# Patient Record
Sex: FEMALE | Race: WHITE | NOT HISPANIC OR LATINO | Employment: FULL TIME | URBAN - METROPOLITAN AREA
[De-identification: names, ages, dates, MRNs, and addresses within clinical notes are randomized per-mention and may not be internally consistent; named-entity substitution may affect disease eponyms.]

---

## 2023-03-02 LAB
ERYTHROCYTE DISTRIBUTION WIDTH (RATIO) BY AUTOMATED COUNT: 14.2 % (ref 11.5–14.5)
ERYTHROCYTE MEAN CORPUSCULAR HEMOGLOBIN CONCENTRATION (G/DL) BY AUTOMATED: 32.2 G/DL (ref 32–36)
ERYTHROCYTE MEAN CORPUSCULAR VOLUME (FL) BY AUTOMATED COUNT: 91 FL (ref 80–100)
ERYTHROCYTES (10*6/UL) IN BLOOD BY AUTOMATED COUNT: 4.08 X10E12/L (ref 4–5.2)
GLUCOSE, 1 HR SCREEN, PREG: 130 MG/DL
HEMATOCRIT (%) IN BLOOD BY AUTOMATED COUNT: 37.3 % (ref 36–46)
HEMOGLOBIN (G/DL) IN BLOOD: 12 G/DL (ref 12–16)
LEUKOCYTES (10*3/UL) IN BLOOD BY AUTOMATED COUNT: 10.4 X10E9/L (ref 4.4–11.3)
NRBC (PER 100 WBCS) BY AUTOMATED COUNT: 0 /100 WBC (ref 0–0)
PLATELETS (10*3/UL) IN BLOOD AUTOMATED COUNT: 157 X10E9/L (ref 150–450)

## 2023-04-27 LAB — GROUP B STREP SCREEN: NORMAL

## 2023-11-07 ENCOUNTER — TELEPHONE (OUTPATIENT)
Dept: OBSTETRICS AND GYNECOLOGY | Facility: CLINIC | Age: 38
End: 2023-11-07
Payer: COMMERCIAL

## 2023-11-07 DIAGNOSIS — B37.2 YEAST DERMATITIS: Primary | ICD-10-CM

## 2023-11-07 RX ORDER — FLUCONAZOLE 150 MG/1
TABLET ORAL
Qty: 3 TABLET | Refills: 1 | Status: SHIPPED | OUTPATIENT
Start: 2023-11-07

## 2023-11-07 NOTE — TELEPHONE ENCOUNTER
Patient was referred to call her obgyn to discuss her symptoms of thrush. She's had for almost two weeks and was wondering if you could recommend anything/send a prescription. Has tried multiple things at home.     Spoke w pt and will tx w fluconazole 150 every other day * 3d

## 2024-04-29 PROBLEM — N91.1 AMENORRHEA, SECONDARY: Status: ACTIVE | Noted: 2024-04-29

## 2024-04-29 PROBLEM — J30.2 SEASONAL ALLERGIES: Status: ACTIVE | Noted: 2024-04-29

## 2024-04-29 PROBLEM — J45.909 ASTHMA (HHS-HCC): Status: ACTIVE | Noted: 2024-04-29

## 2024-04-29 RX ORDER — LORATADINE 10 MG/1
TABLET ORAL
COMMUNITY
Start: 2022-10-18

## 2024-04-29 RX ORDER — NIFEDIPINE 30 MG/1
30 TABLET, EXTENDED RELEASE ORAL DAILY
COMMUNITY
Start: 2023-05-13

## 2024-04-29 RX ORDER — POLYMYXIN B SULFATE AND TRIMETHOPRIM 1; 10000 MG/ML; [USP'U]/ML
SOLUTION OPHTHALMIC
COMMUNITY
Start: 2023-06-23

## 2024-04-29 RX ORDER — ALBUTEROL SULFATE 90 UG/1
AEROSOL, METERED RESPIRATORY (INHALATION)
COMMUNITY
Start: 2022-11-01

## 2024-05-15 ENCOUNTER — APPOINTMENT (OUTPATIENT)
Dept: PULMONOLOGY | Facility: CLINIC | Age: 39
End: 2024-05-15
Payer: COMMERCIAL

## 2024-05-28 ENCOUNTER — LAB (OUTPATIENT)
Dept: LAB | Facility: LAB | Age: 39
End: 2024-05-28
Payer: COMMERCIAL

## 2024-05-29 LAB — COTININE UR QL SCN: NEGATIVE

## 2024-07-24 ENCOUNTER — OFFICE VISIT (OUTPATIENT)
Dept: PULMONOLOGY | Facility: CLINIC | Age: 39
End: 2024-07-24
Payer: COMMERCIAL

## 2024-07-24 VITALS
OXYGEN SATURATION: 98 % | SYSTOLIC BLOOD PRESSURE: 144 MMHG | RESPIRATION RATE: 16 BRPM | WEIGHT: 207.4 LBS | TEMPERATURE: 97.3 F | HEART RATE: 81 BPM | BODY MASS INDEX: 33.48 KG/M2 | DIASTOLIC BLOOD PRESSURE: 89 MMHG

## 2024-07-24 DIAGNOSIS — J45.909 ASTHMA, UNSPECIFIED ASTHMA SEVERITY, UNSPECIFIED WHETHER COMPLICATED, UNSPECIFIED WHETHER PERSISTENT (HHS-HCC): Primary | ICD-10-CM

## 2024-07-24 PROCEDURE — 1036F TOBACCO NON-USER: CPT | Performed by: INTERNAL MEDICINE

## 2024-07-24 PROCEDURE — 99244 OFF/OP CNSLTJ NEW/EST MOD 40: CPT | Performed by: INTERNAL MEDICINE

## 2024-07-24 ASSESSMENT — ENCOUNTER SYMPTOMS
APNEA: 0
EYE REDNESS: 0
WEAKNESS: 0
FATIGUE: 0
NUMBNESS: 0
SHORTNESS OF BREATH: 0
PALPITATIONS: 0
ABDOMINAL DISTENTION: 0
DIZZINESS: 0
AGITATION: 0
ARTHRALGIAS: 0
SINUS PAIN: 0
COUGH: 1
SINUS PRESSURE: 0
ADENOPATHY: 0
HEMATURIA: 0
CHOKING: 0
CONSTIPATION: 0
RHINORRHEA: 0
FEVER: 0
ABDOMINAL PAIN: 0
EYE DISCHARGE: 0
NAUSEA: 0
TREMORS: 0
FACIAL SWELLING: 0
JOINT SWELLING: 0
STRIDOR: 0
UNEXPECTED WEIGHT CHANGE: 0
DIFFICULTY URINATING: 0
SLEEP DISTURBANCE: 0
DYSURIA: 0
NERVOUS/ANXIOUS: 0
SPEECH DIFFICULTY: 0
BRUISES/BLEEDS EASILY: 0
LIGHT-HEADEDNESS: 0
FREQUENCY: 0
HEADACHES: 0
WHEEZING: 0

## 2024-07-24 NOTE — PROGRESS NOTES
@PULMONARY CONSULTATION@         Reason for Consult: asthma     ASSESSMENT:   The patient is a 39-year-old with respiratory symptoms over the last 12 to 13 years beginning with her first pregnancy becoming more persistent since 2016.  She feels at this point she has to determine what is causing the symptoms which are assumed to be related to asthma and get some type of treatment.  She has never been on controller medications to major extent because of all her multiple pregnancies, not wanting to take medication which could potentially an affair with the pregnancy.  She really does not wheezes but has coughing and chest tightness.  She also has a postnasal drip in the morning some eczema.  She has been treated for reflux without improvement.  At times when she starts coughing she drinks water and that seems to help.  The symptoms are likely related to hyperreactive airways but she needs diagnostics to better define the illness and subsequently initiate specific therapies.    PLAN:   Will obtain a respiratory allergy panel along with a CBC with differential looking for eosinophilia    Have ordered complete pulmonary function test and a fractional exhalation of nitric oxide which can be scheduled by calling     In addition I have ordered a chest x-ray which can be obtained at time of appointment tests are completed    Once the results of the above are obtained we will decide on a treatment plan.          HISTORY OF PRESENT ILLNESS:           The patient is a 39-year-old with history of asthma and seasonal allergies.  She reports that starting around 2011 she  developed coughing and chest tightness.  She was reluctant to do much about it because of pregnancy.  Over the last 12 or 13 years she has had 5 children with the most recent bieng 14 months old.  She has now decided that she needs to do something to get her asthma control.  She really has no shortness of breath as such but she can get coughing and  chest tightness at any time.  She had PFTs in 2016 and was given albuterol but she states that she can not use it in the midst of coughing.  She has never been on controllers and has never used a spacer.  She has had some eczema at times on the right hand.  She has a postnasal drip in the morning and has used Flonase but not on a regular basis.  She tried omeprazole for period time without improvement.  She has no aspirin allergy.  She is adopted so there is no known family history.  She is a non-smoker.  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  There are no specific triggers as such but the symptoms can come on when she is talking.  Also at times she has symptoms at night.  She also has a tickle in her throat at times with the postnasal drip particular in the morning.  No Known Allergies     PAST MEDICAL HISTORY:   She has had 5 pregnancies and has had respiratory symptoms over the last 12 to 13 years with coughing and chest tightness.    Current Outpatient Medications:     albuterol 90 mcg/actuation inhaler, Inhale., Disp: , Rfl:     loratadine (Claritin) 10 mg tablet, Take by mouth., Disp: , Rfl:      FAMILY HISTORY:   Adopted  SOCIAL HISTORY:  Never smoker without secondary smoke exposure.  EXPOSURE AND WORK HISTORY:  She works as a nurse practitioner in [employer]     Review of Systems   Constitutional:  Negative for fatigue, fever and unexpected weight change.   HENT:  Positive for postnasal drip. Negative for congestion, facial swelling, nosebleeds, rhinorrhea, sinus pressure and sinus pain.    Eyes:  Negative for discharge, redness and visual disturbance.   Respiratory:  Positive for cough. Negative for apnea, choking, shortness of breath, wheezing and stridor.    Cardiovascular:  Negative for chest pain, palpitations and leg swelling.   Gastrointestinal:  Negative for abdominal distention, abdominal pain, constipation and nausea.   Endocrine: Negative for cold intolerance and heat intolerance.    Genitourinary:  Negative for difficulty urinating, dysuria, frequency and hematuria.   Musculoskeletal:  Negative for arthralgias, gait problem and joint swelling.   Allergic/Immunologic: Negative for environmental allergies, food allergies and immunocompromised state.   Neurological:  Negative for dizziness, tremors, syncope, speech difficulty, weakness, light-headedness, numbness and headaches.   Hematological:  Negative for adenopathy. Does not bruise/bleed easily.   Psychiatric/Behavioral:  Negative for agitation, behavioral problems and sleep disturbance. The patient is not nervous/anxious.         Vitals:    07/24/24 0801   BP: 144/89   Pulse: 81   Resp: 16   Temp: 36.3 °C (97.3 °F)   SpO2: 98%        Physical Exam  Vitals reviewed.   Constitutional:       Appearance: Normal appearance.   HENT:      Head: Normocephalic and atraumatic.   Eyes:      Extraocular Movements: Extraocular movements intact.   Cardiovascular:      Rate and Rhythm: Normal rate and regular rhythm.      Heart sounds: No murmur heard.     No friction rub. No gallop.   Pulmonary:      Effort: Pulmonary effort is normal. No respiratory distress.      Breath sounds: Normal breath sounds. No stridor. No wheezing, rhonchi or rales.   Chest:      Chest wall: No tenderness.   Abdominal:      General: Abdomen is flat. There is no distension.      Palpations: Abdomen is soft. There is no mass.      Tenderness: There is no abdominal tenderness.   Musculoskeletal:         General: Normal range of motion.      Cervical back: Normal range of motion.      Right lower leg: No edema.      Left lower leg: No edema.   Skin:     General: Skin is warm and dry.   Neurological:      Mental Status: She is alert and oriented to person, place, and time.   Psychiatric:         Mood and Affect: Mood normal.         Behavior: Behavior normal.

## 2024-07-24 NOTE — PATIENT INSTRUCTIONS
Will obtain a respiratory allergy panel along with a CBC with differential looking for eosinophilia    Have ordered complete pulmonary function test and a fractional exhalation of nitric oxide which can be scheduled by calling     In addition I have ordered a chest x-ray which can be obtained at time of appointment tests are completed    Once the results of the above are obtained we will decide on a treatment plan.

## 2024-07-24 NOTE — LETTER
July 24, 2024     Herber Dang MD  [address] [facility] [id number]    Patient: Cass Ngo   YOB: 1985   Date of Visit: 7/24/2024       Dear Dr. Herber Dang MD:    Thank you for referring Cass Ngo to me for evaluation. Below are my notes for this consultation.  If you have questions, please do not hesitate to call me. I look forward to following your patient along with you.       Sincerely,     Migel Mohan MD MPH      CC: No Recipients  ______________________________________________________________________________________    @PULMONARY CONSULTATION@         Reason for Consult: asthma     ASSESSMENT:   The patient is a 39-year-old with respiratory symptoms over the last 12 to 13 years beginning with her first pregnancy becoming more persistent since 2016.  She feels at this point she has to determine what is causing the symptoms which are assumed to be related to asthma and get some type of treatment.  She has never been on controller medications to major extent because of all her multiple pregnancies, not wanting to take medication which could potentially an affair with the pregnancy.  She really does not wheezes but has coughing and chest tightness.  She also has a postnasal drip in the morning some eczema.  She has been treated for reflux without improvement.  At times when she starts coughing she drinks water and that seems to help.  The symptoms are likely related to hyperreactive airways but she needs diagnostics to better define the illness and subsequently initiate specific therapies.    PLAN:   Will obtain a respiratory allergy panel along with a CBC with differential looking for eosinophilia    Have ordered complete pulmonary function test and a fractional exhalation of nitric oxide which can be scheduled by calling     In addition I have ordered a chest x-ray which can be obtained at time of appointment tests are completed    Once the results  of the above are obtained we will decide on a treatment plan.          HISTORY OF PRESENT ILLNESS:           The patient is a 39-year-old with history of asthma and seasonal allergies.  She reports that starting around 2011 she  developed coughing and chest tightness.  She was reluctant to do much about it because of pregnancy.  Over the last 12 or 13 years she has had 5 children with the most recent bieng 14 months old.  She has now decided that she needs to do something to get her asthma control.  She really has no shortness of breath as such but she can get coughing and chest tightness at any time.  She had PFTs in 2016 and was given albuterol but she states that she can not use it in the midst of coughing.  She has never been on controllers and has never used a spacer.  She has had some eczema at times on the right hand.  She has a postnasal drip in the morning and has used Flonase but not on a regular basis.  She tried omeprazole for period time without improvement.  She has no aspirin allergy.  She is adopted so there is no known family history.  She is a non-smoker.  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  There are no specific triggers as such but the symptoms can come on when she is talking.  Also at times she has symptoms at night.  She also has a tickle in her throat at times with the postnasal drip particular in the morning.  No Known Allergies     PAST MEDICAL HISTORY:   She has had 5 pregnancies and has had respiratory symptoms over the last 12 to 13 years with coughing and chest tightness.    Current Outpatient Medications:   •  albuterol 90 mcg/actuation inhaler, Inhale., Disp: , Rfl:   •  loratadine (Claritin) 10 mg tablet, Take by mouth., Disp: , Rfl:      FAMILY HISTORY:   Adopted  SOCIAL HISTORY:  Never smoker without secondary smoke exposure.  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   HENT:  Positive for postnasal drip. Negative for congestion, facial swelling, nosebleeds, rhinorrhea, sinus pressure and sinus pain.    Eyes:  Negative for discharge, redness and visual disturbance.   Respiratory:  Positive for cough. Negative for apnea, choking, shortness of breath, wheezing and stridor.    Cardiovascular:  Negative for chest pain, palpitations and leg swelling.   Gastrointestinal:  Negative for abdominal distention, abdominal pain, constipation and nausea.   Endocrine: Negative for cold intolerance and heat intolerance.   Genitourinary:  Negative for difficulty urinating, dysuria, frequency and hematuria.   Musculoskeletal:  Negative for arthralgias, gait problem and joint swelling.   Allergic/Immunologic: Negative for environmental allergies, food allergies and immunocompromised state.   Neurological:  Negative for dizziness, tremors, syncope, speech difficulty, weakness, light-headedness, numbness and headaches.   Hematological:  Negative for adenopathy. Does not bruise/bleed easily.   Psychiatric/Behavioral:  Negative for agitation, behavioral problems and sleep disturbance. The patient is not nervous/anxious.         Vitals:    07/24/24 0801   BP: 144/89   Pulse: 81   Resp: 16   Temp: 36.3 °C (97.3 °F)   SpO2: 98%        Physical Exam  Vitals reviewed.   Constitutional:       Appearance: Normal appearance.   HENT:      Head: Normocephalic and atraumatic.   Eyes:      Extraocular Movements: Extraocular movements intact.   Cardiovascular:      Rate and Rhythm: Normal rate and regular rhythm.      Heart sounds: No murmur heard.     No friction rub. No gallop.   Pulmonary:      Effort: Pulmonary effort is normal. No respiratory distress.      Breath sounds: Normal breath sounds. No stridor. No wheezing, rhonchi or rales.   Chest:      Chest wall: No tenderness.   Abdominal:      General: Abdomen is flat. There is no distension.      Palpations: Abdomen is soft. There is  no mass.      Tenderness: There is no abdominal tenderness.   Musculoskeletal:         General: Normal range of motion.      Cervical back: Normal range of motion.      Right lower leg: No edema.      Left lower leg: No edema.   Skin:     General: Skin is warm and dry.   Neurological:      Mental Status: She is alert and oriented to person, place, and time.   Psychiatric:         Mood and Affect: Mood normal.         Behavior: Behavior normal.

## 2024-12-23 ENCOUNTER — APPOINTMENT (OUTPATIENT)
Dept: OPHTHALMOLOGY | Facility: CLINIC | Age: 39
End: 2024-12-23
Payer: COMMERCIAL

## 2025-01-29 ENCOUNTER — TRANSCRIBE ORDERS (OUTPATIENT)
Dept: RADIOLOGY | Facility: CLINIC | Age: 40
End: 2025-01-29
Payer: COMMERCIAL

## 2025-01-29 DIAGNOSIS — N91.1 SECONDARY AMENORRHEA: Primary | ICD-10-CM

## 2025-02-18 ENCOUNTER — APPOINTMENT (OUTPATIENT)
Dept: RADIOLOGY | Facility: CLINIC | Age: 40
End: 2025-02-18
Payer: COMMERCIAL

## 2025-02-18 ENCOUNTER — APPOINTMENT (OUTPATIENT)
Dept: OBSTETRICS AND GYNECOLOGY | Facility: CLINIC | Age: 40
End: 2025-02-18
Payer: COMMERCIAL

## 2025-02-18 VITALS — WEIGHT: 209 LBS | SYSTOLIC BLOOD PRESSURE: 120 MMHG | DIASTOLIC BLOOD PRESSURE: 78 MMHG | BODY MASS INDEX: 33.73 KG/M2

## 2025-02-18 DIAGNOSIS — N91.1 SECONDARY AMENORRHEA: ICD-10-CM

## 2025-02-18 DIAGNOSIS — N91.1 AMENORRHEA, SECONDARY: ICD-10-CM

## 2025-02-18 DIAGNOSIS — Z33.1 PREGNANT STATE, INCIDENTAL (HHS-HCC): ICD-10-CM

## 2025-02-18 DIAGNOSIS — Z36.82 NUCHAL TRANSLUCENCY OF FETUS ON PRENATAL ULTRASOUND (HHS-HCC): Primary | ICD-10-CM

## 2025-02-18 PROBLEM — O09.529 ANTEPARTUM MULTIGRAVIDA OF ADVANCED MATERNAL AGE (HHS-HCC): Status: ACTIVE | Noted: 2025-02-18

## 2025-02-18 PROBLEM — N83.201 CYST OF RIGHT OVARY: Status: ACTIVE | Noted: 2025-02-18

## 2025-02-18 PROBLEM — Z87.59 HISTORY OF GESTATIONAL HYPERTENSION: Status: ACTIVE | Noted: 2025-02-18

## 2025-02-18 LAB — PREGNANCY TEST URINE, POC: POSITIVE

## 2025-02-18 PROCEDURE — 86900 BLOOD TYPING SEROLOGIC ABO: CPT

## 2025-02-18 PROCEDURE — 86901 BLOOD TYPING SEROLOGIC RH(D): CPT

## 2025-02-18 PROCEDURE — 86850 RBC ANTIBODY SCREEN: CPT

## 2025-02-18 PROCEDURE — 81025 URINE PREGNANCY TEST: CPT | Performed by: OBSTETRICS & GYNECOLOGY

## 2025-02-18 PROCEDURE — 0500F INITIAL PRENATAL CARE VISIT: CPT | Performed by: OBSTETRICS & GYNECOLOGY

## 2025-02-18 PROCEDURE — 87624 HPV HI-RISK TYP POOLED RSLT: CPT

## 2025-02-18 PROCEDURE — 88175 CYTOPATH C/V AUTO FLUID REDO: CPT

## 2025-02-18 PROCEDURE — 76801 OB US < 14 WKS SINGLE FETUS: CPT | Performed by: OBSTETRICS & GYNECOLOGY

## 2025-02-18 ASSESSMENT — EDINBURGH POSTNATAL DEPRESSION SCALE (EPDS)
I HAVE BEEN SO UNHAPPY THAT I HAVE HAD DIFFICULTY SLEEPING: NOT AT ALL
I HAVE FELT SCARED OR PANICKY FOR NO GOOD REASON: NO, NOT AT ALL
THINGS HAVE BEEN GETTING ON TOP OF ME: NO, I HAVE BEEN COPING AS WELL AS EVER
THE THOUGHT OF HARMING MYSELF HAS OCCURRED TO ME: NEVER
I HAVE BEEN ANXIOUS OR WORRIED FOR NO GOOD REASON: NO, NOT AT ALL
I HAVE LOOKED FORWARD WITH ENJOYMENT TO THINGS: AS MUCH AS I EVER DID
I HAVE BLAMED MYSELF UNNECESSARILY WHEN THINGS WENT WRONG: NO, NEVER
I HAVE BEEN SO UNHAPPY THAT I HAVE BEEN CRYING: NO, NEVER
I HAVE FELT SAD OR MISERABLE: NO, NOT AT ALL
I HAVE BEEN ABLE TO LAUGH AND SEE THE FUNNY SIDE OF THINGS: AS MUCH AS I ALWAYS COULD
TOTAL SCORE: 0

## 2025-02-18 NOTE — PROGRESS NOTES
Chief Complaint   Patient presents with    Initial Prenatal Visit     New Ob  Lmp 12/19/24  8+ weeks, edc 9/25/25    C/o  some nausea    Chaperone declined     40-year-old G9, P5.  5 prior vaginal deliveries.  Last pregnancy was in May 2023 when she delivered a 6 pound baby after induction for gestational hypertension and fetal growth restriction.    Planning vasectomy during this pregnancy.    REVIEW OF SYSTEMS    Please see HPI for reported pertinent positives, which would supersede this ROS    Constitutional:  Denies fever, chills, wt gain or loss, fatigue    Genito-Urinary:  Denies genital lesion or sores, vaginal dryness, itching  or pain.  No abnormal vaginal discharge or unexplained vaginal bleeding.  No dysuria, urinary incontinence or frequency.  Denies pelvic pain, dysmenorrhea or dyspareunia.    Eyes:  Denies vision changes, dryness  ENT:  No hearing loss, sinus pain or congestion, nosebleeds  Cardiovascular:  No chest pain or palpitations  Respiratory:  No SOB, cough, wheezing  GI:  No Nausea, vomiting, diarrhea, constipation, abdominal pain  Musculoskeletal:  No new back pain. joint pain, peripheral edema  Skin:  No rash or skin lesion  Neurologic:  No HA, numbness or dizziness  Psychiatric:  No new anxiety or depression  Endocrine:  No loss of hair or hirsutism  Hematologic/lymphatic:  No swollen lymph nodes.  No reported tendency for easy bruising or bleeding    Patient admits to no other systemic complaints      Vitals:    02/18/25 1205   BP: 120/78       PHYSICAL EXAM:    PSYCH:  Pt is alert, oriented and cooperative    SKIN: warm, dry, w/o lesion    HEAD AND FACE:  External inspection of eyes, ears, functional cranial nerves normal and intact    THYROID:  No thyromegaly    CARDIOVASCULAR:  Warm and well Perfused    PULMONARY:  No respiratory distress    ABDOMEN:  soft, nontender.  No mass or organomegaly.      PELVIC:    External genitalia, urethra, perianal region normal to inspection and  palpation if indicated.  No inguinal LA    Vagina without lesions.    Cervix seen and visually normal      Bimanual exam:      No pelvic mass palpable    Uterus nontender, midline in pelvis    No adnexal masses or tenderness    Assessment/Plan    Diagnoses and all orders for this visit:  Amenorrhea, secondary  -     POCT pregnancy, urine manually resulted  -     THINPREP PAP TEST  -     C. trachomatis / N. gonorrhoeae, Amplified, Urogenital  -     Urine Culture  Pregnant state, incidental ([facility])  -     THINPREP PAP TEST  -     C. trachomatis / N. gonorrhoeae, Amplified, Urogenital  -     Urine Culture  AMA  H/o FGR  H/o ghtn - ASA rec  9cm simple cyst R ovary - stable and known from prior pregnancy.

## 2025-02-19 LAB
ABO GROUP (TYPE) IN BLOOD: NORMAL
ANTIBODY SCREEN: NORMAL
C TRACH RRNA SPEC QL NAA+PROBE: NOT DETECTED
N GONORRHOEA RRNA SPEC QL NAA+PROBE: NOT DETECTED
QUEST GC CT AMPLIFIED (ALWAYS MESSAGE): NORMAL
RH FACTOR (ANTIGEN D): NORMAL

## 2025-02-20 LAB
BACTERIA UR CULT: NORMAL
ERYTHROCYTE [DISTWIDTH] IN BLOOD BY AUTOMATED COUNT: 13.1 % (ref 11–15)
EST. AVERAGE GLUCOSE BLD GHB EST-MCNC: 97 MG/DL
EST. AVERAGE GLUCOSE BLD GHB EST-SCNC: 5.4 MMOL/L
HBA1C MFR BLD: 5 % OF TOTAL HGB
HBV SURFACE AG SERPL QL IA: NORMAL
HCT VFR BLD AUTO: 36.5 % (ref 35–45)
HCV AB SERPL QL IA: NORMAL
HGB BLD-MCNC: 12 G/DL (ref 11.7–15.5)
HIV 1+2 AB+HIV1 P24 AG SERPL QL IA: NORMAL
MCH RBC QN AUTO: 28.3 PG (ref 27–33)
MCHC RBC AUTO-ENTMCNC: 32.9 G/DL (ref 32–36)
MCV RBC AUTO: 86.1 FL (ref 80–100)
PLATELET # BLD AUTO: 216 THOUSAND/UL (ref 140–400)
PMV BLD REES-ECKER: 11.5 FL (ref 7.5–12.5)
RBC # BLD AUTO: 4.24 MILLION/UL (ref 3.8–5.1)
RUBV IGG SERPL IA-ACNC: 1.52 INDEX
T PALLIDUM AB SER QL IA: NEGATIVE
WBC # BLD AUTO: 10.8 THOUSAND/UL (ref 3.8–10.8)

## 2025-03-07 LAB
CYTOLOGY CMNT CVX/VAG CYTO-IMP: NORMAL
HPV HR 12 DNA GENITAL QL NAA+PROBE: NEGATIVE
HPV HR GENOTYPES PNL CVX NAA+PROBE: NEGATIVE
HPV16 DNA SPEC QL NAA+PROBE: NEGATIVE
HPV18 DNA SPEC QL NAA+PROBE: NEGATIVE
LAB AP HPV GENOTYPE QUESTION: YES
LAB AP HPV HR: NORMAL
LABORATORY COMMENT REPORT: NORMAL
LMP START DATE: NORMAL
MENSTRUAL HX REPORTED: NORMAL
PATH REPORT.TOTAL CANCER: NORMAL

## 2025-03-18 ENCOUNTER — APPOINTMENT (OUTPATIENT)
Dept: OBSTETRICS AND GYNECOLOGY | Facility: CLINIC | Age: 40
End: 2025-03-18
Payer: COMMERCIAL

## 2025-03-18 ENCOUNTER — APPOINTMENT (OUTPATIENT)
Dept: RADIOLOGY | Facility: CLINIC | Age: 40
End: 2025-03-18
Payer: COMMERCIAL

## 2025-03-21 ENCOUNTER — APPOINTMENT (OUTPATIENT)
Dept: RADIOLOGY | Facility: CLINIC | Age: 40
End: 2025-03-21
Payer: COMMERCIAL

## 2025-03-21 ENCOUNTER — APPOINTMENT (OUTPATIENT)
Dept: OBSTETRICS AND GYNECOLOGY | Facility: CLINIC | Age: 40
End: 2025-03-21
Payer: COMMERCIAL

## 2025-03-21 VITALS — BODY MASS INDEX: 33.25 KG/M2 | WEIGHT: 206 LBS | DIASTOLIC BLOOD PRESSURE: 68 MMHG | SYSTOLIC BLOOD PRESSURE: 102 MMHG

## 2025-03-21 DIAGNOSIS — Z34.81 ENCOUNTER FOR SUPERVISION OF NORMAL PREGNANCY IN MULTIGRAVIDA IN FIRST TRIMESTER: ICD-10-CM

## 2025-03-21 DIAGNOSIS — Z36.82 NUCHAL TRANSLUCENCY OF FETUS ON PRENATAL ULTRASOUND (HHS-HCC): ICD-10-CM

## 2025-03-21 DIAGNOSIS — Z3A.13 13 WEEKS GESTATION OF PREGNANCY (HHS-HCC): ICD-10-CM

## 2025-03-21 PROCEDURE — 0501F PRENATAL FLOW SHEET: CPT | Performed by: OBSTETRICS & GYNECOLOGY

## 2025-03-21 NOTE — PROGRESS NOTES
NT NIPT today.  6 weeks for anatomy scan  Spotting the past week.  Usn today nl.  Disc exam - declines as spotting lessening

## 2025-03-26 DIAGNOSIS — Z3A.13 13 WEEKS GESTATION OF PREGNANCY (HHS-HCC): Primary | ICD-10-CM

## 2025-03-26 PROBLEM — O28.9 ABNORMAL ANTENATAL TEST: Status: ACTIVE | Noted: 2025-03-26

## 2025-03-27 DIAGNOSIS — Z3A.13 13 WEEKS GESTATION OF PREGNANCY (HHS-HCC): Primary | ICD-10-CM

## 2025-04-01 ENCOUNTER — HOSPITAL ENCOUNTER (OUTPATIENT)
Dept: RADIOLOGY | Facility: HOSPITAL | Age: 40
Discharge: HOME | End: 2025-04-01
Payer: COMMERCIAL

## 2025-04-01 ENCOUNTER — TELEPHONE (OUTPATIENT)
Dept: OBSTETRICS AND GYNECOLOGY | Facility: HOSPITAL | Age: 40
End: 2025-04-01
Payer: COMMERCIAL

## 2025-04-01 DIAGNOSIS — Z3A.13 13 WEEKS GESTATION OF PREGNANCY (HHS-HCC): ICD-10-CM

## 2025-04-01 PROCEDURE — 76815 OB US LIMITED FETUS(S): CPT

## 2025-04-01 PROCEDURE — 76815 OB US LIMITED FETUS(S): CPT | Performed by: STUDENT IN AN ORGANIZED HEALTH CARE EDUCATION/TRAINING PROGRAM

## 2025-04-02 ENCOUNTER — CLINICAL SUPPORT (OUTPATIENT)
Dept: GENETICS | Facility: CLINIC | Age: 40
End: 2025-04-02
Payer: COMMERCIAL

## 2025-04-02 ENCOUNTER — TELEPHONE (OUTPATIENT)
Dept: OBSTETRICS AND GYNECOLOGY | Facility: HOSPITAL | Age: 40
End: 2025-04-02
Payer: COMMERCIAL

## 2025-04-02 VITALS — BODY MASS INDEX: 33.09 KG/M2 | WEIGHT: 205 LBS | DIASTOLIC BLOOD PRESSURE: 76 MMHG | SYSTOLIC BLOOD PRESSURE: 130 MMHG

## 2025-04-02 DIAGNOSIS — Z31.5 ENCOUNTER FOR PROCREATIVE GENETIC COUNSELING: ICD-10-CM

## 2025-04-02 DIAGNOSIS — O28.3 ABNORMAL PRENATAL ULTRASOUND: ICD-10-CM

## 2025-04-02 DIAGNOSIS — O28.5 ABNORMAL CHROMOSOMAL AND GENETIC FINDING ON ANTENATAL SCREENING OF MOTHER: ICD-10-CM

## 2025-04-02 DIAGNOSIS — O09.521 SUPERVISION OF ELDERLY MULTIGRAVIDA IN FIRST TRIMESTER: ICD-10-CM

## 2025-04-02 DIAGNOSIS — Z3A.13 13 WEEKS GESTATION OF PREGNANCY (HHS-HCC): ICD-10-CM

## 2025-04-02 PROCEDURE — 96041 GENETIC COUNSELING SVC EA 30: CPT

## 2025-04-02 NOTE — PROGRESS NOTES
"Thank you for the referral of Ms. Cass Ngo. she is a 40 y.o.,  female who was 14 and 6/7 weeks pregnant at the time of our appointment with an EDC of 2025.  She was seen for genetic counseling with with her partner, Gil, due to positive cell free DNA screening for Trisomy 21.    PAST HISTORY:   The couple has 5 children together who are all healthy (12 yo M, 13 yo M, 9 yo F, 5 yo M, 3 yo F). They have had 3 early losses, one of which was confirmed to have Trisomy 15.     Ultrasounds in the current pregnancy:  Dating scan on 2025:  \"Assigned GA8 w + 5 d  Assigned SHALONDA:2025  General Evaluation  ==============  Cardiac activity present  Placenta: Too early to evaluate  Amniotic fluid: normal amount  - Sommers in utero gestation containing yolk sac and fetal pole  - The crown rump length is consistent with the menstrual dating  - Patient states known right ovarian cyst. Measuring 9.3cm on todays study  The patient was informed of the above information.\"    Nuchal translucency scan on 2025:  \"Assigned GA13 w + 1 d  Assigned SHALONDA:2025  General Evaluation  ==============  Cardiac activity present  Placenta: early fundal  Amniotic fluid: normal amount  - Sommers in utero gestation containing yolk sac and fetal pole  - The crown rump length is consistent with the menstrual dating  - Normal Nuchal Translucency  - Rt adnexal cyst 9.3cm unchanged since previous.\"    Early anatomy scan on 2025:  \"Assigned GA14 w + 5 d  Assigned SHALONDA:2025  Impression  =========  REMOTE READ:  Ms. Ngo presents for an early anatomic survey. She had cell free DNA screening that was positive for Trisomy 21 (98.29% PPV).  The purpose of this early second trimester exam is to establish pregnancy dating and to screen for fetal abnormalities reliably detected at this gestational age.  -Gestational age confirmed by fetal biometry  -The heart is difficult to adequately image due to the early " "gestational age, however, appears abnormal on today's exam. The left ventricle appears mildly hypoplastic.  Although this could be due to fetal position, it appears small on all images available for review. The outflow tracts were suboptimally seen.  -The fetal nuchal area does not appear thickened or cystic  -Simple right ovarian cyst measuring 7.8 x 5.8 x 7.1 cm, otherwise unremarkable adnexa  Today's finding were discussed with Cass in person by Dr. Ceja. A fetal echocardiogram was ordered. Cass has a Genetic Counseling appointment scheduled on  4/2/25. Recommend an anatomy ultrasound at 19 weeks\"    Prior aneuploidy screening:  Cell free DNA screening from [employer] reported on 03/26/2025 was high risk for Trisomy 21:      Prior carrier screening:  Normal spinal muscular atrophy screening in 2020. No additional carrier screening results available for review today.     Patient otherwise denies complications such as bleeding or cramping, exposures, infection/illness, and travel outside of the [place] since finding out she was pregnant.    FAMILY HISTORY  Medical and family histories were reviewed and no undue concerns regarding this pregnancy were apparent.    REPORTED ETHNICITY/RACE:   couple    COUNSELING:    The following information was discussed with your patient:    We discussed the methodology for cell free DNA testing in pregnancy, which analyzes placental cell-free DNA obtained from a mother's blood to screen for the presence of common chromosomal abnormalities. The laboratory that performed Ms. Ngo's testing reports a 99.7% sensitivity for Down syndrome (Trisomy 21), 97.9% for trisomy 18, and 99% for trisomy 13. Specificity for these trisomies is >99%. Although sensitivity and specificity rates are high, not all positive results are confirmed to be true positives. The positive predictive value (PPV), or the chance that this is a true positive result and the fetus is affected with Down " syndrome, is approximately 98.29% per the laboratory. The PPV is based on maternal age, gestational age, and test metrics alone. This does not take into account any ultrasound findings. Due to the patient's ultrasound findings, we reviewed that the PPV (chance for the fetus to have Trisomy 21) is likely even closer to 100%.   Because cell free DNA is a blood screen, it is not diagnostic and false positives can occur.  Reasons for false positives include (but are not limited to):   limitation of the technology,   confined placental mosaicism,   maternal factors,   or a vanishing twin.  The availability, benefits and limitations of ultrasound study were discussed today. An ultrasound study is recommended at 12 weeks gestation for assessment of nuchal translucency and again at 19-20 weeks gestation to survey fetal organs. An ultrasound study in the second trimester can identify 50% of Down syndrome cases and 90% of trisomy 18 or trisomy 13 cases.   The availability of diagnostic fetal chromosome analysis via amniocentesis. The methods, benefits, limitations and risks of amniocentesis and a 1 in 400 risk of complications, including a 1 in 800 risk of miscarriage.  We additionally discussed the option of genetic testing at birth. This can often be done through umbilical cord blood at birth. In this case, it is recommended that the pregnancy is managed as if it is a true positive result.   Recurrence risk was briefly discussed at this visit. Most cases of Down syndrome are not inherited and are due to trisomy 21, but there are rare cases that are due to an inherited translocation.  Cell free DNA cannot differentiate between these types. If the pregnancy is genetically confirmed to have trisomy 21 due to nondisjunction, the risk of another affected pregnancy would be increased above risk based on maternal age. If the Trisomy 21 is due to a translocation, and a parent is a carrier, the risks would be significantly higher.  We discussed that diagnostic testing (either prenatally or postnatally) will give us information on recurrence risk.  There is also the possibility of mosaic Down syndrome, which sometimes can be identified by further prenatal testing but not always.   People with Down syndrome have varying levels of intellectual disability with most individuals falling into the mild-moderate intellectual disability range.  Early intervention is known to optimize outcomes.  A referral to [facility] is recommended for children with Down syndrome.  Most babies with Down syndrome will have hypotonia at birth, and physical therapy is beneficial.  About 50% of children with Down syndrome have a heart defect. If the diagnosis is confirmed, a fetal echocardiogram will be recommended. Children with Down syndrome have an increased risk for vision abnormalities and an ophthalmology exam is recommended by six months of age.  Children with Down syndrome also have an increased risk for hearing problems.  Children with Down syndrome are at an increased risk for hypothyroidism, seizures, GI issues, and childhood leukemia.  There is also an increased risk for Alzheimer’s disease in adulthood. While there are a number of conditions that children with Down syndrome are at risk for, most do not have all of these conditions.  Should the pregnancy be truly affected, options include continuation with closer monitoring, adoption, and termination of pregnancy. Each state has their own laws regarding termination of pregnancy. The couple stated that they would not be interested in pregnancy termination, and current state laws were therefore not discussed in detail.     DISPOSITION:  The patient stated that she understood the above information. Diagnostic testing was declined. Additional ultrasounds/fetal echocardiogram are already planned. Patient is aware that I am available if additional questions/concerns arise.    [place] Revised Code (ORC) 3701.69  requires the [facility] to develop a Down syndrome information sheet to be available on the department’s website.  The information sheet is to be given to patients who have a test result indicating Down syndrome, or a prenatal or  diagnosis of Down syndrome.  Physicians, certified nurse-midwives, genetic counselors, hospitals, licensed maternity units,  care nurseries, maternity homes and freestanding birthing centers are required to use the information sheet: [email or web address]_[email or web address]     Thank you for allowing us to participate in the care of your patient.  Should you or your patient have any questions, please do not hesitate to contact our office at [phone].    Total time spent on day of encounter: 50 minutes (35 minutes with patient, 15 minutes on pre/post patient care activities, including documentation).    Sincerely,   Candelaria Anne MS, [facility] [place]  Licensed and Certified Genetic Counselor

## 2025-04-11 LAB — COMMENTS - MP RESULT TYPE: NORMAL

## 2025-04-30 PROBLEM — Z3A.13 13 WEEKS GESTATION OF PREGNANCY (HHS-HCC): Status: ACTIVE | Noted: 2025-04-30

## 2025-04-30 PROBLEM — Z3A.08 8 WEEKS GESTATION OF PREGNANCY (HHS-HCC): Status: ACTIVE | Noted: 2025-04-30

## 2025-04-30 PROBLEM — Q02 MICROCEPHALY (MULTI): Status: ACTIVE | Noted: 2025-04-30

## 2025-04-30 PROBLEM — N91.1 AMENORRHEA, SECONDARY: Status: RESOLVED | Noted: 2024-04-29 | Resolved: 2025-04-30

## 2025-05-02 ENCOUNTER — APPOINTMENT (OUTPATIENT)
Dept: OBSTETRICS AND GYNECOLOGY | Facility: CLINIC | Age: 40
End: 2025-05-02
Payer: COMMERCIAL

## 2025-05-02 ENCOUNTER — APPOINTMENT (OUTPATIENT)
Dept: RADIOLOGY | Facility: CLINIC | Age: 40
End: 2025-05-02
Payer: COMMERCIAL

## 2025-05-02 VITALS — SYSTOLIC BLOOD PRESSURE: 112 MMHG | DIASTOLIC BLOOD PRESSURE: 80 MMHG | BODY MASS INDEX: 33.41 KG/M2 | WEIGHT: 207 LBS

## 2025-05-02 DIAGNOSIS — Z3A.19 19 WEEKS GESTATION OF PREGNANCY (HHS-HCC): ICD-10-CM

## 2025-05-02 DIAGNOSIS — Z34.83 ENCOUNTER FOR SUPERVISION OF NORMAL PREGNANCY IN MULTIGRAVIDA IN THIRD TRIMESTER: ICD-10-CM

## 2025-05-02 PROCEDURE — 0501F PRENATAL FLOW SHEET: CPT | Performed by: OBSTETRICS & GYNECOLOGY

## 2025-05-05 ENCOUNTER — APPOINTMENT (OUTPATIENT)
Dept: RADIOLOGY | Facility: HOSPITAL | Age: 40
End: 2025-05-05
Payer: COMMERCIAL

## 2025-05-05 ENCOUNTER — HOSPITAL ENCOUNTER (OUTPATIENT)
Facility: HOSPITAL | Age: 40
Setting detail: OBSERVATION
End: 2025-05-05
Attending: STUDENT IN AN ORGANIZED HEALTH CARE EDUCATION/TRAINING PROGRAM | Admitting: STUDENT IN AN ORGANIZED HEALTH CARE EDUCATION/TRAINING PROGRAM
Payer: COMMERCIAL

## 2025-05-05 ENCOUNTER — HOSPITAL ENCOUNTER (EMERGENCY)
Facility: HOSPITAL | Age: 40
Discharge: ANOTHER HOSPITAL | End: 2025-05-05
Attending: EMERGENCY MEDICINE
Payer: COMMERCIAL

## 2025-05-05 ENCOUNTER — HOSPITAL ENCOUNTER (OUTPATIENT)
Facility: HOSPITAL | Age: 40
Discharge: HOME | End: 2025-05-05
Attending: STUDENT IN AN ORGANIZED HEALTH CARE EDUCATION/TRAINING PROGRAM | Admitting: STUDENT IN AN ORGANIZED HEALTH CARE EDUCATION/TRAINING PROGRAM
Payer: COMMERCIAL

## 2025-05-05 VITALS
WEIGHT: 205 LBS | BODY MASS INDEX: 32.95 KG/M2 | TEMPERATURE: 98.1 F | RESPIRATION RATE: 20 BRPM | HEART RATE: 91 BPM | SYSTOLIC BLOOD PRESSURE: 160 MMHG | DIASTOLIC BLOOD PRESSURE: 93 MMHG | HEIGHT: 66 IN | OXYGEN SATURATION: 97 %

## 2025-05-05 VITALS
OXYGEN SATURATION: 98 % | WEIGHT: 205.03 LBS | HEIGHT: 66 IN | DIASTOLIC BLOOD PRESSURE: 71 MMHG | SYSTOLIC BLOOD PRESSURE: 119 MMHG | BODY MASS INDEX: 32.95 KG/M2 | HEART RATE: 84 BPM | RESPIRATION RATE: 17 BRPM | TEMPERATURE: 97 F

## 2025-05-05 DIAGNOSIS — R10.31 RIGHT LOWER QUADRANT ABDOMINAL PAIN: Primary | ICD-10-CM

## 2025-05-05 DIAGNOSIS — Z3A.19 19 WEEKS GESTATION OF PREGNANCY (HHS-HCC): ICD-10-CM

## 2025-05-05 LAB
ABO GROUP (TYPE) IN BLOOD: NORMAL
ALBUMIN SERPL BCP-MCNC: 4.1 G/DL (ref 3.4–5)
ALP SERPL-CCNC: 74 U/L (ref 33–110)
ALT SERPL W P-5'-P-CCNC: 10 U/L (ref 7–45)
ANION GAP SERPL CALCULATED.3IONS-SCNC: 10 MMOL/L (ref 10–20)
ANTIBODY SCREEN: NORMAL
APPEARANCE UR: CLEAR
AST SERPL W P-5'-P-CCNC: 12 U/L (ref 9–39)
B-HCG SERPL-ACNC: ABNORMAL MIU/ML
BASOPHILS # BLD AUTO: 0.03 X10*3/UL (ref 0–0.1)
BASOPHILS NFR BLD AUTO: 0.3 %
BILIRUB SERPL-MCNC: 0.7 MG/DL (ref 0–1.2)
BILIRUB UR STRIP.AUTO-MCNC: NEGATIVE MG/DL
BUN SERPL-MCNC: 8 MG/DL (ref 6–23)
CALCIUM SERPL-MCNC: 8.9 MG/DL (ref 8.6–10.3)
CHLORIDE SERPL-SCNC: 104 MMOL/L (ref 98–107)
CO2 SERPL-SCNC: 25 MMOL/L (ref 21–32)
COLOR UR: COLORLESS
CREAT SERPL-MCNC: 0.63 MG/DL (ref 0.5–1.05)
EGFRCR SERPLBLD CKD-EPI 2021: >90 ML/MIN/1.73M*2
EOSINOPHIL # BLD AUTO: 0.02 X10*3/UL (ref 0–0.7)
EOSINOPHIL NFR BLD AUTO: 0.2 %
ERYTHROCYTE [DISTWIDTH] IN BLOOD BY AUTOMATED COUNT: 13.9 % (ref 11.5–14.5)
GLUCOSE SERPL-MCNC: 105 MG/DL (ref 74–99)
GLUCOSE UR STRIP.AUTO-MCNC: NORMAL MG/DL
HCT VFR BLD AUTO: 37 % (ref 36–46)
HGB BLD-MCNC: 12.9 G/DL (ref 12–16)
HOLD SPECIMEN: NORMAL
IMM GRANULOCYTES # BLD AUTO: 0.05 X10*3/UL (ref 0–0.7)
IMM GRANULOCYTES NFR BLD AUTO: 0.5 % (ref 0–0.9)
KETONES UR STRIP.AUTO-MCNC: NEGATIVE MG/DL
LEUKOCYTE ESTERASE UR QL STRIP.AUTO: NEGATIVE
LIPASE SERPL-CCNC: 27 U/L (ref 9–82)
LYMPHOCYTES # BLD AUTO: 0.99 X10*3/UL (ref 1.2–4.8)
LYMPHOCYTES NFR BLD AUTO: 10.5 %
MAGNESIUM SERPL-MCNC: 1.89 MG/DL (ref 1.6–2.4)
MCH RBC QN AUTO: 29.8 PG (ref 26–34)
MCHC RBC AUTO-ENTMCNC: 34.9 G/DL (ref 32–36)
MCV RBC AUTO: 86 FL (ref 80–100)
MONOCYTES # BLD AUTO: 0.33 X10*3/UL (ref 0.1–1)
MONOCYTES NFR BLD AUTO: 3.5 %
NEUTROPHILS # BLD AUTO: 8.04 X10*3/UL (ref 1.2–7.7)
NEUTROPHILS NFR BLD AUTO: 85 %
NITRITE UR QL STRIP.AUTO: NEGATIVE
NRBC BLD-RTO: 0 /100 WBCS (ref 0–0)
PH UR STRIP.AUTO: 7 [PH]
PLATELET # BLD AUTO: 160 X10*3/UL (ref 150–450)
POTASSIUM SERPL-SCNC: 4.1 MMOL/L (ref 3.5–5.3)
PROT SERPL-MCNC: 7.1 G/DL (ref 6.4–8.2)
PROT UR STRIP.AUTO-MCNC: NEGATIVE MG/DL
RBC # BLD AUTO: 4.33 X10*6/UL (ref 4–5.2)
RBC # UR STRIP.AUTO: NEGATIVE MG/DL
RH FACTOR (ANTIGEN D): NORMAL
SODIUM SERPL-SCNC: 135 MMOL/L (ref 136–145)
SP GR UR STRIP.AUTO: 1
UROBILINOGEN UR STRIP.AUTO-MCNC: NORMAL MG/DL
WBC # BLD AUTO: 9.5 X10*3/UL (ref 4.4–11.3)

## 2025-05-05 PROCEDURE — 96376 TX/PRO/DX INJ SAME DRUG ADON: CPT

## 2025-05-05 PROCEDURE — 36415 COLL VENOUS BLD VENIPUNCTURE: CPT | Performed by: PHYSICIAN ASSISTANT

## 2025-05-05 PROCEDURE — 76815 OB US LIMITED FETUS(S): CPT | Mod: FOREIGN READ | Performed by: RADIOLOGY

## 2025-05-05 PROCEDURE — 2500000004 HC RX 250 GENERAL PHARMACY W/ HCPCS (ALT 636 FOR OP/ED): Mod: JZ | Performed by: PHYSICIAN ASSISTANT

## 2025-05-05 PROCEDURE — 74181 MRI ABDOMEN W/O CONTRAST: CPT

## 2025-05-05 PROCEDURE — 84702 CHORIONIC GONADOTROPIN TEST: CPT | Performed by: PHYSICIAN ASSISTANT

## 2025-05-05 PROCEDURE — 80053 COMPREHEN METABOLIC PANEL: CPT | Performed by: PHYSICIAN ASSISTANT

## 2025-05-05 PROCEDURE — 93975 VASCULAR STUDY: CPT

## 2025-05-05 PROCEDURE — 74181 MRI ABDOMEN W/O CONTRAST: CPT | Performed by: RADIOLOGY

## 2025-05-05 PROCEDURE — 83735 ASSAY OF MAGNESIUM: CPT | Performed by: PHYSICIAN ASSISTANT

## 2025-05-05 PROCEDURE — 99214 OFFICE O/P EST MOD 30 MIN: CPT | Mod: 25

## 2025-05-05 PROCEDURE — 99215 OFFICE O/P EST HI 40 MIN: CPT | Mod: 27

## 2025-05-05 PROCEDURE — 81003 URINALYSIS AUTO W/O SCOPE: CPT | Performed by: PHYSICIAN ASSISTANT

## 2025-05-05 PROCEDURE — 76815 OB US LIMITED FETUS(S): CPT

## 2025-05-05 PROCEDURE — 85025 COMPLETE CBC W/AUTO DIFF WBC: CPT | Performed by: PHYSICIAN ASSISTANT

## 2025-05-05 PROCEDURE — 86850 RBC ANTIBODY SCREEN: CPT | Performed by: PHYSICIAN ASSISTANT

## 2025-05-05 PROCEDURE — 76815 OB US LIMITED FETUS(S): CPT | Performed by: RADIOLOGY

## 2025-05-05 PROCEDURE — 83690 ASSAY OF LIPASE: CPT | Performed by: PHYSICIAN ASSISTANT

## 2025-05-05 PROCEDURE — 99213 OFFICE O/P EST LOW 20 MIN: CPT

## 2025-05-05 PROCEDURE — 76817 TRANSVAGINAL US OBSTETRIC: CPT | Performed by: RADIOLOGY

## 2025-05-05 PROCEDURE — G0378 HOSPITAL OBSERVATION PER HR: HCPCS

## 2025-05-05 PROCEDURE — 99214 OFFICE O/P EST MOD 30 MIN: CPT

## 2025-05-05 PROCEDURE — 96374 THER/PROPH/DIAG INJ IV PUSH: CPT

## 2025-05-05 PROCEDURE — 99285 EMERGENCY DEPT VISIT HI MDM: CPT | Mod: 25 | Performed by: EMERGENCY MEDICINE

## 2025-05-05 PROCEDURE — 36415 COLL VENOUS BLD VENIPUNCTURE: CPT

## 2025-05-05 RX ORDER — LABETALOL HYDROCHLORIDE 5 MG/ML
20 INJECTION, SOLUTION INTRAVENOUS ONCE AS NEEDED
Status: DISCONTINUED | OUTPATIENT
Start: 2025-05-05 | End: 2025-05-05 | Stop reason: HOSPADM

## 2025-05-05 RX ORDER — LIDOCAINE HYDROCHLORIDE 10 MG/ML
0.5 INJECTION, SOLUTION INFILTRATION; PERINEURAL ONCE AS NEEDED
Status: DISCONTINUED | OUTPATIENT
Start: 2025-05-05 | End: 2025-05-05 | Stop reason: HOSPADM

## 2025-05-05 RX ORDER — NIFEDIPINE 10 MG/1
10 CAPSULE ORAL ONCE AS NEEDED
Status: DISCONTINUED | OUTPATIENT
Start: 2025-05-05 | End: 2025-05-05 | Stop reason: HOSPADM

## 2025-05-05 RX ORDER — MORPHINE SULFATE 4 MG/ML
4 INJECTION, SOLUTION INTRAMUSCULAR; INTRAVENOUS ONCE
Status: COMPLETED | OUTPATIENT
Start: 2025-05-05 | End: 2025-05-05

## 2025-05-05 RX ORDER — HYDRALAZINE HYDROCHLORIDE 20 MG/ML
5 INJECTION INTRAMUSCULAR; INTRAVENOUS ONCE AS NEEDED
Status: DISCONTINUED | OUTPATIENT
Start: 2025-05-05 | End: 2025-05-05 | Stop reason: HOSPADM

## 2025-05-05 RX ADMIN — SODIUM CHLORIDE 1000 ML: 900 INJECTION, SOLUTION INTRAVENOUS at 09:41

## 2025-05-05 RX ADMIN — MORPHINE SULFATE 4 MG: 4 INJECTION, SOLUTION INTRAMUSCULAR; INTRAVENOUS at 11:19

## 2025-05-05 RX ADMIN — MORPHINE SULFATE 4 MG: 4 INJECTION, SOLUTION INTRAMUSCULAR; INTRAVENOUS at 14:04

## 2025-05-05 RX ADMIN — MORPHINE SULFATE 4 MG: 4 INJECTION, SOLUTION INTRAMUSCULAR; INTRAVENOUS at 10:55

## 2025-05-05 SDOH — SOCIAL STABILITY: SOCIAL INSECURITY: DOES ANYONE TRY TO KEEP YOU FROM HAVING/CONTACTING OTHER FRIENDS OR DOING THINGS OUTSIDE YOUR HOME?: NO

## 2025-05-05 SDOH — HEALTH STABILITY: MENTAL HEALTH: HAVE YOU USED ANY PRESCRIPTION DRUGS OTHER THAN PRESCRIBED IN THE PAST 12 MONTHS?: NO

## 2025-05-05 SDOH — SOCIAL STABILITY: SOCIAL INSECURITY: HAVE YOU HAD THOUGHTS OF HARMING ANYONE ELSE?: NO

## 2025-05-05 SDOH — SOCIAL STABILITY: SOCIAL INSECURITY: ARE YOU OR HAVE YOU BEEN THREATENED OR ABUSED PHYSICALLY, EMOTIONALLY, OR SEXUALLY BY ANYONE?: NO

## 2025-05-05 SDOH — HEALTH STABILITY: MENTAL HEALTH: NON-SPECIFIC ACTIVE SUICIDAL THOUGHTS (PAST 1 MONTH): NO

## 2025-05-05 SDOH — SOCIAL STABILITY: SOCIAL INSECURITY: ABUSE SCREEN: ADULT

## 2025-05-05 SDOH — ECONOMIC STABILITY: HOUSING INSECURITY: DO YOU FEEL UNSAFE GOING BACK TO THE PLACE WHERE YOU ARE LIVING?: NO

## 2025-05-05 SDOH — HEALTH STABILITY: MENTAL HEALTH: SUICIDAL BEHAVIOR (LIFETIME): NO

## 2025-05-05 SDOH — HEALTH STABILITY: MENTAL HEALTH: WISH TO BE DEAD (PAST 1 MONTH): NO

## 2025-05-05 SDOH — SOCIAL STABILITY: SOCIAL INSECURITY: HAVE YOU HAD ANY THOUGHTS OF HARMING ANYONE ELSE?: NO

## 2025-05-05 SDOH — HEALTH STABILITY: MENTAL HEALTH: HAVE YOU USED ANY SUBSTANCES (CANABIS, COCAINE, HEROIN, HALLUCINOGENS, INHALANTS, ETC.) IN THE PAST 12 MONTHS?: NO

## 2025-05-05 SDOH — SOCIAL STABILITY: SOCIAL INSECURITY: PHYSICAL ABUSE: DENIES

## 2025-05-05 SDOH — SOCIAL STABILITY: SOCIAL INSECURITY: DO YOU FEEL ANYONE HAS EXPLOITED OR TAKEN ADVANTAGE OF YOU FINANCIALLY OR OF YOUR PERSONAL PROPERTY?: NO

## 2025-05-05 SDOH — SOCIAL STABILITY: SOCIAL INSECURITY: ARE THERE ANY APPARENT SIGNS OF INJURIES/BEHAVIORS THAT COULD BE RELATED TO ABUSE/NEGLECT?: NO

## 2025-05-05 SDOH — SOCIAL STABILITY: SOCIAL INSECURITY: VERBAL ABUSE: DENIES

## 2025-05-05 SDOH — HEALTH STABILITY: MENTAL HEALTH: WERE YOU ABLE TO COMPLETE ALL THE BEHAVIORAL HEALTH SCREENINGS?: YES

## 2025-05-05 SDOH — SOCIAL STABILITY: SOCIAL INSECURITY: HAS ANYONE EVER THREATENED TO HURT YOUR FAMILY OR YOUR PETS?: NO

## 2025-05-05 ASSESSMENT — PAIN DESCRIPTION - ORIENTATION
ORIENTATION: RIGHT;LOWER
ORIENTATION: RIGHT;LOWER;MID
ORIENTATION: RIGHT

## 2025-05-05 ASSESSMENT — PAIN - FUNCTIONAL ASSESSMENT
PAIN_FUNCTIONAL_ASSESSMENT: 0-10

## 2025-05-05 ASSESSMENT — LIFESTYLE VARIABLES
HOW OFTEN DO YOU HAVE A DRINK CONTAINING ALCOHOL: NEVER
HOW MANY STANDARD DRINKS CONTAINING ALCOHOL DO YOU HAVE ON A TYPICAL DAY: PATIENT DOES NOT DRINK
SKIP TO QUESTIONS 9-10: 1
AUDIT-C TOTAL SCORE: 0
EVER HAD A DRINK FIRST THING IN THE MORNING TO STEADY YOUR NERVES TO GET RID OF A HANGOVER: NO
AUDIT-C TOTAL SCORE: 0
EVER FELT BAD OR GUILTY ABOUT YOUR DRINKING: NO
TOTAL SCORE: 0
HAVE YOU EVER FELT YOU SHOULD CUT DOWN ON YOUR DRINKING: NO
HOW OFTEN DO YOU HAVE 6 OR MORE DRINKS ON ONE OCCASION: NEVER
HAVE PEOPLE ANNOYED YOU BY CRITICIZING YOUR DRINKING: NO

## 2025-05-05 ASSESSMENT — PAIN SCALES - GENERAL
PAINLEVEL_OUTOF10: 10 - WORST POSSIBLE PAIN
PAINLEVEL_OUTOF10: 2
PAINLEVEL_OUTOF10: 10 - WORST POSSIBLE PAIN
PAINLEVEL_OUTOF10: 3
PAINLEVEL_OUTOF10: 8

## 2025-05-05 ASSESSMENT — PAIN DESCRIPTION - LOCATION
LOCATION: ABDOMEN

## 2025-05-05 ASSESSMENT — PAIN DESCRIPTION - PAIN TYPE: TYPE: ACUTE PAIN

## 2025-05-05 ASSESSMENT — PATIENT HEALTH QUESTIONNAIRE - PHQ9
SUM OF ALL RESPONSES TO PHQ9 QUESTIONS 1 & 2: 0
2. FEELING DOWN, DEPRESSED OR HOPELESS: NOT AT ALL
1. LITTLE INTEREST OR PLEASURE IN DOING THINGS: NOT AT ALL

## 2025-05-05 NOTE — H&P
OB Triage H&P    Assessment/Plan    Cass Ngo is a 40 y.o.  at 19w4d, SHALONDA: 2025, by Last Menstrual Period, who presents to triage from [facility] with RLQ pain. Sent to [facility] via EMS for further imaging.    Plan      RLQ Abdominal Pain  - SVE: FT/0/-4  - Pain 10/10-> tylenol + morphine sulfate at [facility]-> 2/10 on presentation at [facility]  - US at [facility] n/f simple cyst on R ovary 7.5 x 6.4 x 5.9  - MRI abdomen w/o contrast negative for appendicitis  - Pelvic US at [facility] negative for ovarian torsion  - Low s/f PTL  - Unknown etiology of pain this morning    IUP 19.4 wga  - bpm by doppler  -Up to date on prenatal care  -Continue routine prenatal care    Dispo  -Patient appropriate for discharge home, agrees with plan  -Return precautions discussed   -Follow up at next scheduled OB appointment or to triage sooner as needed    Discussed plan and reviewed with: Dr. Guerin    Pregnancy Problems (from 25 to present)       Problem Noted Diagnosed Resolved    13 weeks gestation of pregnancy ([facility]) 2025 by Angelita Cartagena MA  No    Priority:  Medium       Overview Signed 2025  3:25 PM by Angelita Cartagena MA   US  25  9w1d, edc 25    3/21/25    Desired provider in labor: [x] CNM  [x] Physician   [x] Either Acceptable  [x] Blood Products: [x] Yes, accepts [] No, needs counseling  [x] Initial BMI: 33.10   [x] Prenatal Labs:  25  [x] Cervical Cancer Screening up to date  [x] Rh status:  POSITIVE  [] Screen for IPV and Substance Use Risk:  [x] Genetic Screening (cfDNA):  3/21/25  [x] First Trimester Anatomy Screen (11-13.6 wks): 3/21/25, NT  [] Baby ASA (initiated):  [x] Pregnancy dated by:  LMP    [x] Anatomy US: (19-20 wks)  [] Federal Sterilization consent signed (if indicated):  [] 1hr GCT at 24-28wks:  [] Rhogam (if indicated): n/a  [x] Fetal Surveillance (if indicated):  [] Tdap (27-32 wks, may be given up to 36 wks if initial window missed):   [] RSV (32-36  "wks) (Sept. to end of ):     [] Feeding Intentions:  [] Postpartum Birth control method:   [] GBS at 36 - 37 wks:  [] 39 weeks discussion of IOL vs. Expectant management:  [x] Mode of delivery ( anticipated ): V         Antepartum multigravida of advanced maternal age ([facility]) 2025 by Herber Dang MD  No    Priority:  Medium               Subjective     Denies vaginal bleeding., Denies contractions., Denies leaking of fluid.      RLQ pain started suddenly this morning and got progressively worse to the point she was in severe pain, unable to stand up straight. Denies N/V.  She received Tylenol and morphine in the ED at [facility], now pain is a 2/10 (down from 10/10).   Denies constipation- reports normal BMs, last BM yesterday morning.    Prenatal Provider Dr. Dang    OB History    Para Term  AB Living   9 5 5 0 3 5   SAB IAB Ectopic Multiple Live Births   2 0 1 0 5      # Outcome Date GA Lbr Delvis/2nd Weight Sex Type Anes PTL Lv   9 Current            8 Term 23 38w1d  2.722 kg  Vag-Spont EPI N JULITA   7 Term 21 38w0d  3.09 kg M Vag-Spont EPI N JULITA      Name: \"Cuco\"   6 Term 16 40w2d  4.167 kg F Vag-Spont EPI N JULITA      Name: \"Khushbu\"   5 Term 10/18/12 38w4d  4.252 kg M Vag-Spont EPI N JULITA      Name: \"Omkar\"   4 Term 11 39w3d  4.111 kg M Vag-Spont EPI N JULITA      Name: \"Lex\"   3 Ectopic            2 SAB  10w0d          1 SAB  9w5d              Surgical History[1]    Social History     Tobacco Use    Smoking status: Never     Passive exposure: Never    Smokeless tobacco: Never   Substance Use Topics    Alcohol use: Not Currently       Allergies[2]    Prescriptions Prior to Admission[3]  Objective     Last Vitals  Temp Pulse Resp BP MAP O2 Sat   36.6 °C (97.9 °F) 95 (Simultaneous filing. User may not have seen previous data.) 18 135/75 (Simultaneous filing. User may not have seen previous data.) 99 97 %     Blood Pressures         2025  1514             " BP: 135/75             Physical Exam  General: NAD, mood appropriate  Cardiopulmonary: warm and well perfused, breathing comfortably on room air  Abdomen: Gravid, tender with palpation of RLQ-> mid-abdomen on lateral aspect, no rebound tenderness, no guarding  Extremities: Symmetric  Speculum Exam: deferred  Cervix: Fingertip /  /      Chaperone Present: Yes.  Chaperone Name/Title: Yary Santiago RN  Examination Chaperoned: Gynecological Exam     Fetal Monitoring   bpm by doppler    Bedside ultrasound: No    Labs in chart were reviewed.   Results from last 7 days   Lab Units 05/05/25  0941   WBC AUTO x10*3/uL 9.5   HEMOGLOBIN g/dL 12.9   HEMATOCRIT % 37.0   PLATELETS AUTO x10*3/uL 160   AST U/L 12   ALT U/L 10   CREATININE mg/dL 0.63        Prenatal labs reviewed, not remarkable.             [1]   Past Surgical History:  Procedure Laterality Date    OTHER SURGICAL HISTORY  10/18/2022    Dilation and curettage    OTHER SURGICAL HISTORY  10/18/2022    Cholecystectomy    OTHER SURGICAL HISTORY  10/18/2022    Tonsillectomy with adenoidectomy    OTHER SURGICAL HISTORY  10/18/2022    Uterine polypectomy   [2] No Known Allergies  [3]   Medications Prior to Admission   Medication Sig Dispense Refill Last Dose/Taking    albuterol 90 mcg/actuation inhaler Inhale.       loratadine (Claritin) 10 mg tablet Take by mouth.

## 2025-05-05 NOTE — ED PROVIDER NOTES
HPI   Chief Complaint   Patient presents with    Abdominal Pain       HPI  40-year-old female coming in for right lower quad abdominal pain with pregnancy.  The patient has indicated that pain started yesterday, it was sharp in nature, isolated predominant to the right lower quadrant.  No injury trauma or impact.  She also endorses that she is approximately 19 weeks pregnant.  She denies any vaginal discharge or bleeding.  Says that her pain is actually better now but based on location of the pain she was concerned and wanted to come in for assessment.  History of cholecystectomy in the past.  Says that she currently feels much better and does not have any specific complaint      Patient History   Medical History[1]  Surgical History[2]  Family History[3]  Social History[4]    Physical Exam   ED Triage Vitals [05/05/25 0814]   Temperature Heart Rate Respirations BP   36.3 °C (97.3 °F) 88 16 119/76      Pulse Ox Temp Source Heart Rate Source Patient Position   100 % Temporal Radial Sitting      BP Location FiO2 (%)     Left arm --       Physical Exam  PHYSICAL EXAMINATION    GENERAL APPEARANCE: Awake and alert.     VITAL SIGNS: As per the nurses' triage record.     HEENT: Normocephalic, atraumatic. Extraocular muscles are intact. Pupils equal round and reactive to light. Conjunctiva are pink. Negative scleral icterus. Mucous membranes are moist. Tongue in the midline. Pharynx was without erythema or exudates, uvula midline    NECK: Soft Nontender and supple, full gross ROM, no meningeal signs.    CHEST: Nontender to palpation. Clear to auscultation bilaterally. No rales, rhonchi, or wheezing.     HEART: S1, S2. Regular rate and rhythm. No murmurs, gallops or rubs.  Strong and equal pulses in the extremities.     ABDOMEN: Soft, nontender, no guarding rebound or rigidity nondistended, positive bowel sounds, no palpable masses.    MUSCULOSKELETAL: The calves are nontender to palpation. Full gross active range of motion.  Ambulating on own with no acute difficulties     NEUROLOGICAL: Awake, alert and oriented x 3. Power intact in the upper and lower extremities. Sensation is intact to light touch in the upper and lower extremities.     IMMUNOLOGICAL: No lymphatic streaking noted     DERM: No petechiae, rashes, or ecchymoses.    ED Course & MDM   ED Course as of 05/05/25 1804   Mon May 05, 2025   1051 Patient suddenly started having worsening of pain once again, discussed risk and benefit of opiates, being pregnant I offered Tylenol versus morphine, patient says that she did something stronger than Tylenol and is agreeable to the risk versus benefit of morphine. [AP]   1234 Spoke to Dr. Brenda Villa, she requested an MRI be done for evaluation of appendectomy and then also spoke to the [facility] physician who has accepted transfer.  Will work on getting the MRI before transfer however we will not delay transfer. [AP]      ED Course User Index  [AP] Derrick Keys, ORLY         Diagnoses as of 05/05/25 1804   Right lower quadrant abdominal pain   19 weeks gestation of pregnancy ([facility])                 No data recorded     Patti Coma Scale Score: 15 (05/05/25 0815 : Cara Singh, SUZANNE)                           Medical Decision Making  Parts of this chart have been completed using voice recognition software. Please excuse any errors of transcription.  My thought process and reason for plan has been formulated from the time that I saw the patient until the time of disposition and is not specific to one specific moment during their visit and furthermore my MDM encompasses this entire chart and not only this text box.      HPI: Detailed above.    Exam: A medically appropriate exam performed, outlined above, given the known history and presentation.    History Limited by: Nothing    History obtained from: The patient    External/internal records reviewed: No external record reviewed    Social Determinants of Health considered during  this visit: Lives at home    Chronic conditions impacting care: Denies    Medications given during visit:  Medications   sodium chloride 0.9 % bolus 1,000 mL (0 mL intravenous Stopped 5/5/25 1011)   morphine injection 4 mg (4 mg intravenous Given 5/5/25 1055)   morphine injection 4 mg (4 mg intravenous Given 5/5/25 1119)   morphine injection 4 mg (4 mg intravenous Given 5/5/25 1404)        Diagnostic/tests  Labs Reviewed   CBC WITH AUTO DIFFERENTIAL - Abnormal       Result Value    WBC 9.5      nRBC 0.0      RBC 4.33      Hemoglobin 12.9      Hematocrit 37.0      MCV 86      MCH 29.8      MCHC 34.9      RDW 13.9      Platelets 160      Neutrophils % 85.0      Immature Granulocytes %, Automated 0.5      Lymphocytes % 10.5      Monocytes % 3.5      Eosinophils % 0.2      Basophils % 0.3      Neutrophils Absolute 8.04 (*)     Immature Granulocytes Absolute, Automated 0.05      Lymphocytes Absolute 0.99 (*)     Monocytes Absolute 0.33      Eosinophils Absolute 0.02      Basophils Absolute 0.03     COMPREHENSIVE METABOLIC PANEL - Abnormal    Glucose 105 (*)     Sodium 135 (*)     Potassium 4.1      Chloride 104      Bicarbonate 25      Anion Gap 10      Urea Nitrogen 8      Creatinine 0.63      eGFR >90      Calcium 8.9      Albumin 4.1      Alkaline Phosphatase 74      Total Protein 7.1      AST 12      Bilirubin, Total 0.7      ALT 10     URINALYSIS WITH REFLEX CULTURE AND MICROSCOPIC - Abnormal    Color, Urine Colorless (*)     Appearance, Urine Clear      Specific Gravity, Urine 1.005      pH, Urine 7.0      Protein, Urine NEGATIVE      Glucose, Urine Normal      Blood, Urine NEGATIVE      Ketones, Urine NEGATIVE      Bilirubin, Urine NEGATIVE      Urobilinogen, Urine Normal      Nitrite, Urine NEGATIVE      Leukocyte Esterase, Urine NEGATIVE     HUMAN CHORIONIC GONADOTROPIN, SERUM QUANTITATIVE - Abnormal    HCG, Beta-Quantitative 79,359 (*)     Narrative:      Total HCG measurement is performed using the [employer] Access   Immunoassay which detects intact HCG and free beta HCG subunit.    This test is not indicated for use as a tumor marker.   HCG testing is performed using a different test methodology at [facility] than other [facility] hospitals. Direct result comparison   should only be made within the same method.       LIPASE - Normal    Lipase 27      Narrative:     Venipuncture immediately after or during the administration of Metamizole may lead to falsely low results. Testing should be performed immediately prior to Metamizole dosing.   MAGNESIUM - Normal    Magnesium 1.89     TYPE AND SCREEN    ABO TYPE O      Rh TYPE POS      ANTIBODY SCREEN NEG     URINALYSIS WITH REFLEX CULTURE AND MICROSCOPIC    Narrative:     The following orders were created for panel order Urinalysis with Reflex Culture and Microscopic.  Procedure                               Abnormality         Status                     ---------                               -----------         ------                     Urinalysis with Reflex C...[[id number]]  Abnormal            Final result               Extra Urine Gray Tube[[id number]]                            In process                   Please view results for these tests on the individual orders.   EXTRA URINE GRAY TUBE      US OB limited 1+ fetuses   Final Result   Sommers intrauterine pregnancy.  Established SHALONDA is September 25, 2025 by LMP, correlating with 19 weeks 4 days gestation.   1.7 x 1.2 x 2.1 cm placental lake.   6.4 x 5.9 x 7.5 cm simple right ovarian cyst, stable when compared to   prior ultrasound.   Signed by Edison Valencia MD           Case discussed with: ed attending and transfer center    Considerations/further MDM:   I have considered and evaluated for the following diagnoses: Acute appendicitis, bowel obstruction, cholecystitis, diverticulitis, hernia, incarcerated/strangulated hernia, mesenteric ischemia, pancreatitis  Acute appendicitis, bowel  obstruction, cholecystitis, diverticulitis, hernia, incarcerated/strangulated hernia, mesenteric ischemia, also considered perforated bowel, ulcer, acute abdomen, surgical abdomen, splenic injury or internal bleeding, pancreatitis, also considered perforated bowel, ulcer, acute abdomen, surgical abdomen, splenic injury or internal bleeding.    With the large right sided cyst intermittent torsion is certainly considered.  However it was also considered that this may be appendicitis, an MRI was ordered but not completed due to the fact that MRI was unavailable until 5 PM.  The patient has been accepted in transfer for further assessment downtown  Procedure  Procedures       [1]   Past Medical History:  Diagnosis Date    14 weeks gestation of pregnancy ([facility]) 11/29/2022    14 weeks gestation of pregnancy    Amenorrhea, secondary 04/29/2024    Chronic cough 05/31/2016    Encounter for supervision of other normal pregnancy, first trimester 11/29/2022    Encounter for supervision of normal pregnancy in multigravida in first trimester    Secondary amenorrhea 11/01/2022    Amenorrhea, secondary   [2]   Past Surgical History:  Procedure Laterality Date    OTHER SURGICAL HISTORY  10/18/2022    Dilation and curettage    OTHER SURGICAL HISTORY  10/18/2022    Cholecystectomy    OTHER SURGICAL HISTORY  10/18/2022    Tonsillectomy with adenoidectomy    OTHER SURGICAL HISTORY  10/18/2022    Uterine polypectomy   [3] No family history on file.  [4]   Social History  Tobacco Use    Smoking status: Never     Passive exposure: Never    Smokeless tobacco: Never   Vaping Use    Vaping status: Never Used   Substance Use Topics    Alcohol use: Not Currently    Drug use: Never        Derrick Keys PA-C  05/05/25 9696     Alert and oriented to person, place and time. Normal mood and affect, no apparent risk to self or others

## 2025-05-05 NOTE — ED TRIAGE NOTES
"Drove self to ER with c/o RLQ pain since 0500. Took tylenol with some  pain relief. Pain 10/10 to 3/10 \"Just wanted to make sure it wasn't appendicitis. I also have an ovarian cyst on that side\" per pt.  19wks pregnant.   "

## 2025-05-08 ENCOUNTER — APPOINTMENT (OUTPATIENT)
Dept: PEDIATRIC CARDIOLOGY | Facility: CLINIC | Age: 40
End: 2025-05-08
Payer: COMMERCIAL

## 2025-05-08 VITALS
WEIGHT: 205.25 LBS | HEIGHT: 67 IN | HEART RATE: 77 BPM | SYSTOLIC BLOOD PRESSURE: 125 MMHG | BODY MASS INDEX: 32.21 KG/M2 | DIASTOLIC BLOOD PRESSURE: 80 MMHG

## 2025-05-08 DIAGNOSIS — O35.BXX0 ABNORMAL FETAL ECHOCARDIOGRAPHY AFFECTING ANTEPARTUM CARE OF MOTHER, SINGLE OR UNSPECIFIED FETUS (HHS-HCC): Primary | ICD-10-CM

## 2025-05-08 DIAGNOSIS — O28.3 ABNORMAL FETAL ULTRASOUND: ICD-10-CM

## 2025-05-08 DIAGNOSIS — O35.8XX0 MATERNAL CARE FOR OTHER (SUSPECTED) FETAL ABNORMALITY AND DAMAGE, NOT APPLICABLE OR UNSPECIFIED (HHS-HCC): ICD-10-CM

## 2025-05-08 PROCEDURE — 76825 ECHO EXAM OF FETAL HEART: CPT | Performed by: PEDIATRICS

## 2025-05-08 PROCEDURE — 76827 ECHO EXAM OF FETAL HEART: CPT | Performed by: PEDIATRICS

## 2025-05-08 PROCEDURE — 3008F BODY MASS INDEX DOCD: CPT | Performed by: PEDIATRICS

## 2025-05-08 PROCEDURE — 93325 DOPPLER ECHO COLOR FLOW MAPG: CPT | Performed by: PEDIATRICS

## 2025-05-08 PROCEDURE — 99245 OFF/OP CONSLTJ NEW/EST HI 55: CPT | Performed by: PEDIATRICS

## 2025-05-08 NOTE — PATIENT INSTRUCTIONS
Your fetus likely has an atrioventricular canal defect (AV canal).     A complete atrioventricular canal defect is actually a combination of several closely associated heart problems that result in:    Atrial septal defect (ASD), a hole in the wall between the upper chambers of the heart  Ventricular septal defect (VSD), a hole in the wall between the pumping chambers of the heart  Abnormalities of the atrioventricular valves (usually mitral and tricuspid), the “doors” between the receiving chambers and the pumping chambers. There should be 2 valves, but in this case, there is only one valve    Blood can move freely among the four heart chambers, mixing oxygen-rich (red) blood with oxygen-poor (blue) blood.  AV canal occurs in two out of 10,000 births.  The condition is common in children with Down syndrome (about 40%).  Surgery in the first six months of life is often necessary to correct the defects.  After surgery, most children lead healthy lives but will need lifelong follow-up care.  A small proportion (~10%) will need additional surgery later in life    In most children, the cause isn't known. It's a very common type of heart defect in children with a chromosome problem, Trisomy 21 (Down syndrome). Some children can have other heart defects along with AV canal.    Normally, the left side of the heart only pumps blood to the body, and the heart's right side only pumps blood to the lungs. In a child with AV canal defect, blood can travel across the holes from the left heart chambers to the right heart chambers and out into the lung arteries. The extra blood being pumped into the lung arteries makes the heart and lungs work harder and the lungs can become congested.    A child with AV canal defect may breathe faster and harder than normal. Infants may have trouble feeding and growing at a normal rate. Symptoms may not occur until several weeks after birth. High pressure may occur in the blood vessels in  the lungs because more blood than normal is being pumped there. Over time this causes permanent damage to the lung blood vessels.    In some infants, the common valve between the upper and lower chambers doesn't close properly. This lets blood leak backward from the heart's lower chambers to the upper ones. This leak, called regurgitation or insufficiency, can make the heart work harder, too.    An AV canal can be fixed. Open-heart surgery is needed to repair the defect. Unlike some other types of septal defects, the AV canal defect can't close on its own. Medicines may be used temporarily to help with symptoms, but they don't cure the defect or prevent permanent damage to the lung arteries.     There is also a variation of normal in the way the veins from the upper left body return to the heart, a persistent left superior vena cava.  In most cases, the left upper veins drain across the upper chest and join a large vein to come back to the right atrium (right receiving chamber).  In this case, the veins drain behind the heart and join the coronary veins to go to the right atrium.  We do not need to do anything about this.     Sometimes it is not possible to see all the heart structures because of the position or size of the fetus. This does not mean they are not there, but may mean that for technical reasons they cannot be assessed. Sometimes this information may not be important; while in some cases it means that definite answers are not possible. The echocardiographer will discuss this with you if necessary, and repeat studies are frequently performed later in the pregnancy.     Certain congenital heart abnormalities are also hard to detect by fetal echocardiograms, but often these are simple abnormalities. We do not mention this to concern you, but rather that you understand that there are technical limitations to such studies. It remains important that your pediatrician provides a normal careful medical  examination of the baby (including the heart) takes place after birth, and if there were any suspicious cardiac findings, that these are evaluated in the usual way irrespective of the findings at fetal study.     Certain communications between the two sides of the circulation are normally present in all developing babies and normally close after birth. We are not able to tell in advance whether this will occur, however there is only a tiny chance that they will not. Persistence of these structures is generally not a difficult problem to deal with.     We direct our attention only to the heart, where we have special expertise. This is not the same as your general obstetric ultrasound scan. Other ultrasound information about the fetus can be obtained from an obstetric ultrasonographer or your obstetrician.

## 2025-05-08 NOTE — PROGRESS NOTES
Cass Ngo was seen at the request of Herber Dang for a chief complaint of AMA, increased risk of trisomy 21 on NIPT, and possible HLHS on 15 3/7 week obstetric scan; a report with my findings is being sent via written or electronic means the referring physician with my recommendations for treatment.     I had the pleasure of seeing Cass Ngo in Pediatric Cardiology consultation at our [facility] location as part of our prenatal heart program for AMA, increased risk of trisomy 21 on NIPT, and possible HLHS on 15 3/7 week obstetric scan.  She is a 40 y.o. year-old  woman, currently 20w0d weeks gestation. Patient's last menstrual period was 2024. Estimated Date of Delivery: 25.  There have been no pregnancy complications.   She has not been hospitalized during this pregnancy.  She had a NIPT, which indicated an increased risk of trisomy 21 (98.29% PPV).  She has not yet had a second trimester ultrasound.      Prior to the visit, I personally reviewed the cardiac portions of the obstetrical ultrasound performed on 25.  There appears to be a complete common atrioventricular canal defect that is balanced at the atrial and ventricular level. There is no evidence of right or left ventricular outflow obstruction or significant valvular regurgitation.    Her previous obstetrical history is significant for 5 full term deliveries, 2 miscarriages, and 1 ectopic pregnancy.  Her past medical history is significant for asthma.  She has no history of congenital heart disease, arrhythmia, cardiomyopathy, hypercholesterolemia, hypertension, diabetes, rheumatic heart disease, cancer, lupus, Sjogren syndrome, clotting disorder, depression, anxiety, alcohol abuse, phenylketonuria, or DiGeorge.  She has had a cholecystectomy.  She is not taking any medications.  She has no known allergies.  She is currently taking prenatal vitamins.      Her family history is negative for congenital heart  "disease, early atherosclerosis, sudden cardiac death, long QT syndrome, cardiomyopathy, aortic aneurysm, or genetic or metabolic disease.  EBONI states that his family history is negative for all the above as well.    She currently lives with her fiance and 5 children.  She works as an NP at [employer].  She does not smoke.  She denies illicit drug use or alcohol abuse.  She denies verbal, sexual, or physical abuse.     Delivery [facility]: [facility]  Father of the baby's name: Luis E    /80 (BP Location: Right arm, Patient Position: Sitting)   Pulse 77   Ht 1.698 m (5' 6.85\")   Wt 93.1 kg (205 lb 4 oz)   LMP 12/19/2024   BMI 32.29 kg/m²     She was resting comfortably in the examination room and alert, active and in no respiratory distress. Skin was without rash.  HEENT: moist mucous membranes, no JVD, goiter. Breathing is not labored.  She was acyanotic.  There was no peripheral edema.   The abdomen was gravid, soft, nontender with normal bowel sounds.  The liver was not palpable.  The spleen tip was not palpable.  She had a normal gait and normal strength in all extremities.  Cranial nerves II - XII are intact.  She had no clubbing, cyanosis, or edema.    Two-dimensional sector scan and Doppler fetal echocardiogram performed at 20w0d weeks gestation show segmental anatomy S,D,S with complete atrioventricular canal defect and a persistent left superior vena cava. There is no atrial dilation. The foramen ovale is patent. There is a moderate sized primum atrial septal defect. At least 2 pulmonary veins are seen returning normally to the left atrium. 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  The left SVC drains to a dilated coronary sinus.  The inferior vena cava returns normally to the right atrium. There is a common atrioventricular valve; with trivial atrioventricular valve regurgitation. The valve is balanced over both ventricles.  There is a large inlet ventricular septal defect.  The left and right ventricular " size and shortening are normal. No left ventricular outflow tract or right ventricular outflow tract obstruction. There is a left aortic arch. The aortic and ductal arches are patent. The fetal heart rate was within normal limits without ectopy or arrhythmia seen.  The spectral Doppler patterns were normal across all valves.  The spectral Doppler patterns of the ductal arch, aortic arch, aortic isthmus and umbilical artery were within normal limits.  There is a physiologic pericardial effusion.  Please see report for details.    In summary, Cass is a 40 y.o.  woman, currently 20w0d weeks gestation, who had a fetal echo consistent with a complete common atrioventricular canal defect. The defect appears balanced at the atrial and ventricular level. The diagnosis was discussed in detail with the parents including medical management in the post-uzair period and surgical intervention, which generally occurs around 4-6 months of age.   In the meantime, we did not make any changes to her current delivery plan.  We did not prescribe any medications.  We did not recommend intervention.  As always, we recommend a heart healthy lifestyle.  If the pregnancy is continued, recommend follow up fetal echocardiogram in the early 3rd trimester. We recommend a cardiology consultation within 24 hours after birth.  Disposition will depend to either the NICU or  nursery will depend on this evaluation.  The findings and limitations of fetal echocardiography were explained.    LOC: 2  Today's fetal echocardiogram demonstrated complete common atrioventricular canal defect.  This is a cardiovascular anomaly or disease that is NOT expected to cause hemodynamic instability at birth, but has potential to cause hemodynamic instability.  Neonatology management in [facility]  Non emergent cardiology consultation.  For fetal echocardiogram findings of an unclear etiology, as long as the baby's clinical presentation allows, recommended a  cardiology evaluation in conjunction with the neonatology and CTICU teams in [facility] to confirm fetal findings prior to ultimate disposition ([facility], CTICU or NICU*).     Scribe’s statement: I, Sofia Ulrich CNP, am scribing for, and in the presence of, Dr. Farias.    Thank you for allowing me to participate in Cass's care.  If you have any further questions, please do not hesitate to contact me.     Lex Farias M.D.  [facility], Director  Ambulatory Pediatric Cardiology   Division of Pediatric Cardiology  [facility]  [facility]   of Pediatrics, [employer]  [facility] [address] [place] [address]  [place] [id number]  Office:  [phone]  Fax:       [phone]  e-mail:  [email or web address]    I spent greater than 60 minutes in performance of this consultation, of which greater than 50% was related to coordination of care or counseling.

## 2025-05-08 NOTE — LETTER
May 8, 2025     Herber Dang MD  [address] [address] [address]    Patient: Cass Ngo   YOB: 1985   Date of Visit: 2025       Dear Dr. Herber Dang MD:    Thank you for referring Cass Ngo to me for evaluation. Below are my notes for this consultation.  If you have questions, please do not hesitate to call me. I look forward to following your patient along with you.       Sincerely,     Lex Farias MD      CC: MD Shannan Holt, APRN-CN, [facility]  Sofia Ulrich, APRN-CNP  ______________________________________________________________________________________    Cass Ngo was seen at the request of Herber Dang for a chief complaint of AMA, increased risk of trisomy 21 on NIPT, and possible HLHS on 15 3/7 week obstetric scan; a report with my findings is being sent via written or electronic means the referring physician with my recommendations for treatment.     I had the pleasure of seeing Cass Ngo in Pediatric Cardiology consultation at our [facility] location as part of our prenatal heart program for AMA, increased risk of trisomy 21 on NIPT, and possible HLHS on 15 3/7 week obstetric scan.  She is a 40 y.o. year-old  woman, currently 20w0d weeks gestation. Patient's last menstrual period was 2024. Estimated Date of Delivery: 25.  There have been no pregnancy complications.   She has not been hospitalized during this pregnancy.  She had a NIPT, which indicated an increased risk of trisomy 21 (98.29% PPV).  She has not yet had a second trimester ultrasound.      Prior to the visit, I personally reviewed the cardiac portions of the obstetrical ultrasound performed on 25.  There appears to be a complete common atrioventricular canal defect that is balanced at the atrial and ventricular level. There is no evidence of right or left ventricular outflow obstruction or significant  "valvular regurgitation.    Her previous obstetrical history is significant for 5 full term deliveries, 2 miscarriages, and 1 ectopic pregnancy.  Her past medical history is significant for asthma.  She has no history of congenital heart disease, arrhythmia, cardiomyopathy, hypercholesterolemia, hypertension, diabetes, rheumatic heart disease, cancer, lupus, Sjogren syndrome, clotting disorder, depression, anxiety, alcohol abuse, phenylketonuria, or DiGeorge.  She has had a cholecystectomy.  She is not taking any medications.  She has no known allergies.  She is currently taking prenatal vitamins.      Her family history is negative for congenital heart disease, early atherosclerosis, sudden cardiac death, long QT syndrome, cardiomyopathy, aortic aneurysm, or genetic or metabolic disease.  FOB states that his family history is negative for all the above as well.    She currently lives with her fiance and 5 children.  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  She denies illicit drug use or alcohol abuse.  She denies verbal, sexual, or physical abuse.     Delivery Hospital: [facility]  Father of the baby's name: Luis E    /80 (BP Location: Right arm, Patient Position: Sitting)   Pulse 77   Ht 1.698 m (5' 6.85\")   Wt 93.1 kg (205 lb 4 oz)   LMP 12/19/2024   BMI 32.29 kg/m²     She was resting comfortably in the examination room and alert, active and in no respiratory distress. Skin was without rash.  HEENT: moist mucous membranes, no JVD, goiter. Breathing is not labored.  She was acyanotic.  There was no peripheral edema.   The abdomen was gravid, soft, nontender with normal bowel sounds.  The liver was not palpable.  The spleen tip was not palpable.  She had a normal gait and normal strength in all extremities.  Cranial nerves II - XII are intact.  She had no clubbing, cyanosis, or edema.    Two-dimensional sector scan and Doppler fetal echocardiogram performed at 20w0d weeks gestation show segmental " anatomy S,D,S with complete atrioventricular canal defect and a persistent left superior vena cava. There is no atrial dilation. The foramen ovale is patent. There is a moderate sized primum atrial septal defect. At least 2 pulmonary veins are seen returning normally to the left atrium. 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  The left SVC drains to a dilated coronary sinus.  The inferior vena cava returns normally to the right atrium. There is a common atrioventricular valve; with trivial atrioventricular valve regurgitation. The valve is balanced over both ventricles.  There is a large inlet ventricular septal defect.  The left and right ventricular size and shortening are normal. No left ventricular outflow tract or right ventricular outflow tract obstruction. There is a left aortic arch. The aortic and ductal arches are patent. The fetal heart rate was within normal limits without ectopy or arrhythmia seen.  The spectral Doppler patterns were normal across all valves.  The spectral Doppler patterns of the ductal arch, aortic arch, aortic isthmus and umbilical artery were within normal limits.  There is a physiologic pericardial effusion.  Please see report for details.    In summary, Cass is a 40 y.o.  woman, currently 20w0d weeks gestation, who had a fetal echo consistent with a complete common atrioventricular canal defect. The defect appears balanced at the atrial and ventricular level. The diagnosis was discussed in detail with the parents including medical management in the post- period and surgical intervention, which generally occurs around 4-6 months of age.   In the meantime, we did not make any changes to her current delivery plan.  We did not prescribe any medications.  We did not recommend intervention.  As always, we recommend a heart healthy lifestyle.  If the pregnancy is continued, recommend follow up fetal echocardiogram in the early 3rd trimester. We recommend a cardiology  consultation within 24 hours after birth.  Disposition will depend to either the NICU or  nursery will depend on this evaluation.  The findings and limitations of fetal echocardiography were explained.    LOC: 2  Today's fetal echocardiogram demonstrated complete common atrioventricular canal defect.  This is a cardiovascular anomaly or disease that is NOT expected to cause hemodynamic instability at birth, but has potential to cause hemodynamic instability.  Neonatology management in   Non emergent cardiology consultation.  For fetal echocardiogram findings of an unclear etiology, as long as the baby's clinical presentation allows, recommended a cardiology evaluation in conjunction with the neonatology and CTICU teams in [facility] to confirm fetal findings prior to ultimate disposition ([facility], CTICU or NICU*).     Scribe’s statement: I, Sofia Ulrich CNP, am scribing for, and in the presence of, Dr. Farias.    Thank you for allowing me to participate in Cass's care.  If you have any further questions, please do not hesitate to contact me.     Lex Farias M.D.  [facility], Director  Ambulatory Pediatric Cardiology   Division of Pediatric Cardiology  [facility]  [facility]   of Pediatrics, [facility]  [facility] [address]  [place] [address]  Office:  [phone]  Fax:       [phone]  e-mail:  [email or web address]    I spent greater than 60 minutes in performance of this consultation, of which greater than 50% was related to coordination of care or counseling.

## 2025-05-09 ENCOUNTER — HOSPITAL ENCOUNTER (OUTPATIENT)
Dept: RADIOLOGY | Facility: HOSPITAL | Age: 40
Discharge: HOME | End: 2025-05-09
Payer: COMMERCIAL

## 2025-05-09 DIAGNOSIS — Z3A.13 13 WEEKS GESTATION OF PREGNANCY (HHS-HCC): ICD-10-CM

## 2025-05-09 PROBLEM — Q24.9 CONGENITAL HEART DEFECT: Status: ACTIVE | Noted: 2025-05-09

## 2025-05-09 PROCEDURE — 76811 OB US DETAILED SNGL FETUS: CPT

## 2025-05-14 ENCOUNTER — DOCUMENTATION (OUTPATIENT)
Dept: OBSTETRICS AND GYNECOLOGY | Facility: HOSPITAL | Age: 40
End: 2025-05-14
Payer: COMMERCIAL

## 2025-05-19 ENCOUNTER — ANESTHESIA (OUTPATIENT)
Dept: OPERATING ROOM | Facility: HOSPITAL | Age: 40
End: 2025-05-19
Payer: COMMERCIAL

## 2025-05-19 ENCOUNTER — ANESTHESIA EVENT (OUTPATIENT)
Dept: OPERATING ROOM | Facility: HOSPITAL | Age: 40
End: 2025-05-19

## 2025-05-19 ENCOUNTER — HOSPITAL ENCOUNTER (OUTPATIENT)
Facility: HOSPITAL | Age: 40
Setting detail: OBSERVATION
Discharge: HOME | End: 2025-05-20
Attending: STUDENT IN AN ORGANIZED HEALTH CARE EDUCATION/TRAINING PROGRAM | Admitting: OBSTETRICS & GYNECOLOGY
Payer: COMMERCIAL

## 2025-05-19 ENCOUNTER — APPOINTMENT (OUTPATIENT)
Dept: RADIOLOGY | Facility: HOSPITAL | Age: 40
End: 2025-05-19
Payer: COMMERCIAL

## 2025-05-19 DIAGNOSIS — N83.201 CYST OF RIGHT OVARY: Primary | ICD-10-CM

## 2025-05-19 DIAGNOSIS — G89.18 POSTOPERATIVE PAIN: ICD-10-CM

## 2025-05-19 LAB
ABO GROUP (TYPE) IN BLOOD: NORMAL
ALBUMIN SERPL BCP-MCNC: 3.8 G/DL (ref 3.4–5)
ALP SERPL-CCNC: 81 U/L (ref 33–110)
ALT SERPL W P-5'-P-CCNC: 9 U/L (ref 7–45)
ANION GAP SERPL CALC-SCNC: 12 MMOL/L (ref 10–20)
ANTIBODY SCREEN: NORMAL
AST SERPL W P-5'-P-CCNC: 14 U/L (ref 9–39)
BILIRUB SERPL-MCNC: 0.9 MG/DL (ref 0–1.2)
BUN SERPL-MCNC: 7 MG/DL (ref 6–23)
CALCIUM SERPL-MCNC: 8.9 MG/DL (ref 8.6–10.6)
CHLORIDE SERPL-SCNC: 103 MMOL/L (ref 98–107)
CO2 SERPL-SCNC: 23 MMOL/L (ref 21–32)
CREAT SERPL-MCNC: 0.55 MG/DL (ref 0.5–1.05)
EGFRCR SERPLBLD CKD-EPI 2021: >90 ML/MIN/1.73M*2
ERYTHROCYTE [DISTWIDTH] IN BLOOD BY AUTOMATED COUNT: 13.5 % (ref 11.5–14.5)
GLUCOSE SERPL-MCNC: 165 MG/DL (ref 74–99)
HCT VFR BLD AUTO: 34.7 % (ref 36–46)
HGB BLD-MCNC: 11.9 G/DL (ref 12–16)
MCH RBC QN AUTO: 28.7 PG (ref 26–34)
MCHC RBC AUTO-ENTMCNC: 34.3 G/DL (ref 32–36)
MCV RBC AUTO: 84 FL (ref 80–100)
NRBC BLD-RTO: 0 /100 WBCS (ref 0–0)
PLATELET # BLD AUTO: 170 X10*3/UL (ref 150–450)
POC APPEARANCE, URINE: CLEAR
POC BILIRUBIN, URINE: NEGATIVE
POC BLOOD, URINE: ABNORMAL
POC COLOR, URINE: YELLOW
POC GLUCOSE, URINE: ABNORMAL MG/DL
POC KETONES, URINE: ABNORMAL MG/DL
POC LEUKOCYTES, URINE: NEGATIVE
POC NITRITE,URINE: NEGATIVE
POC PH, URINE: 6.5 PH
POC PROTEIN, URINE: ABNORMAL MG/DL
POC SPECIFIC GRAVITY, URINE: 1.02
POC UROBILINOGEN, URINE: 0.2 EU/DL
POTASSIUM SERPL-SCNC: 3.6 MMOL/L (ref 3.5–5.3)
PROT SERPL-MCNC: 6.6 G/DL (ref 6.4–8.2)
RBC # BLD AUTO: 4.15 X10*6/UL (ref 4–5.2)
RH FACTOR (ANTIGEN D): NORMAL
SODIUM SERPL-SCNC: 134 MMOL/L (ref 136–145)
WBC # BLD AUTO: 10 X10*3/UL (ref 4.4–11.3)

## 2025-05-19 PROCEDURE — 80053 COMPREHEN METABOLIC PANEL: CPT | Performed by: NURSE PRACTITIONER

## 2025-05-19 PROCEDURE — 2500000004 HC RX 250 GENERAL PHARMACY W/ HCPCS (ALT 636 FOR OP/ED): Mod: JZ | Performed by: NURSE PRACTITIONER

## 2025-05-19 PROCEDURE — 81002 URINALYSIS NONAUTO W/O SCOPE: CPT | Performed by: NURSE PRACTITIONER

## 2025-05-19 PROCEDURE — 36415 COLL VENOUS BLD VENIPUNCTURE: CPT | Performed by: NURSE PRACTITIONER

## 2025-05-19 PROCEDURE — 96376 TX/PRO/DX INJ SAME DRUG ADON: CPT

## 2025-05-19 PROCEDURE — 76770 US EXAM ABDO BACK WALL COMP: CPT | Performed by: STUDENT IN AN ORGANIZED HEALTH CARE EDUCATION/TRAINING PROGRAM

## 2025-05-19 PROCEDURE — 96374 THER/PROPH/DIAG INJ IV PUSH: CPT

## 2025-05-19 PROCEDURE — 93975 VASCULAR STUDY: CPT

## 2025-05-19 PROCEDURE — 85027 COMPLETE CBC AUTOMATED: CPT | Performed by: NURSE PRACTITIONER

## 2025-05-19 PROCEDURE — 2500000001 HC RX 250 WO HCPCS SELF ADMINISTERED DRUGS (ALT 637 FOR MEDICARE OP): Performed by: NURSE PRACTITIONER

## 2025-05-19 PROCEDURE — 87086 URINE CULTURE/COLONY COUNT: CPT | Performed by: NURSE PRACTITIONER

## 2025-05-19 PROCEDURE — 76770 US EXAM ABDO BACK WALL COMP: CPT

## 2025-05-19 PROCEDURE — 86901 BLOOD TYPING SEROLOGIC RH(D): CPT | Performed by: NURSE PRACTITIONER

## 2025-05-19 PROCEDURE — 76815 OB US LIMITED FETUS(S): CPT | Performed by: STUDENT IN AN ORGANIZED HEALTH CARE EDUCATION/TRAINING PROGRAM

## 2025-05-19 PROCEDURE — 86900 BLOOD TYPING SEROLOGIC ABO: CPT | Performed by: NURSE PRACTITIONER

## 2025-05-19 PROCEDURE — 86901 BLOOD TYPING SEROLOGIC RH(D): CPT | Performed by: OBSTETRICS & GYNECOLOGY

## 2025-05-19 PROCEDURE — 76856 US EXAM PELVIC COMPLETE: CPT

## 2025-05-19 PROCEDURE — 2500000004 HC RX 250 GENERAL PHARMACY W/ HCPCS (ALT 636 FOR OP/ED): Mod: JZ

## 2025-05-19 RX ORDER — HYDRALAZINE HYDROCHLORIDE 20 MG/ML
5 INJECTION INTRAMUSCULAR; INTRAVENOUS ONCE AS NEEDED
Status: DISCONTINUED | OUTPATIENT
Start: 2025-05-19 | End: 2025-05-20 | Stop reason: HOSPADM

## 2025-05-19 RX ORDER — NIFEDIPINE 10 MG/1
10 CAPSULE ORAL ONCE AS NEEDED
Status: DISCONTINUED | OUTPATIENT
Start: 2025-05-19 | End: 2025-05-20 | Stop reason: HOSPADM

## 2025-05-19 RX ORDER — MORPHINE SULFATE 2 MG/ML
2 INJECTION, SOLUTION INTRAMUSCULAR; INTRAVENOUS ONCE
Status: COMPLETED | OUTPATIENT
Start: 2025-05-19 | End: 2025-05-19

## 2025-05-19 RX ORDER — ALBUTEROL SULFATE 90 UG/1
2 INHALANT RESPIRATORY (INHALATION) EVERY 4 HOURS PRN
Status: DISCONTINUED | OUTPATIENT
Start: 2025-05-19 | End: 2025-05-20 | Stop reason: HOSPADM

## 2025-05-19 RX ORDER — MORPHINE SULFATE 2 MG/ML
2 INJECTION, SOLUTION INTRAMUSCULAR; INTRAVENOUS EVERY 4 HOURS PRN
Status: DISCONTINUED | OUTPATIENT
Start: 2025-05-19 | End: 2025-05-20 | Stop reason: HOSPADM

## 2025-05-19 RX ORDER — ONDANSETRON HYDROCHLORIDE 2 MG/ML
4 INJECTION, SOLUTION INTRAVENOUS EVERY 6 HOURS PRN
Status: DISCONTINUED | OUTPATIENT
Start: 2025-05-19 | End: 2025-05-20 | Stop reason: HOSPADM

## 2025-05-19 RX ORDER — MORPHINE SULFATE 4 MG/ML
4 INJECTION INTRAVENOUS ONCE
Status: COMPLETED | OUTPATIENT
Start: 2025-05-19 | End: 2025-05-19

## 2025-05-19 RX ORDER — ONDANSETRON 4 MG/1
4 TABLET, FILM COATED ORAL EVERY 6 HOURS PRN
Status: DISCONTINUED | OUTPATIENT
Start: 2025-05-19 | End: 2025-05-20 | Stop reason: HOSPADM

## 2025-05-19 RX ORDER — CYCLOBENZAPRINE HCL 10 MG
10 TABLET ORAL ONCE
Status: COMPLETED | OUTPATIENT
Start: 2025-05-19 | End: 2025-05-19

## 2025-05-19 RX ORDER — LIDOCAINE HYDROCHLORIDE 10 MG/ML
0.5 INJECTION, SOLUTION INFILTRATION; PERINEURAL ONCE AS NEEDED
Status: DISCONTINUED | OUTPATIENT
Start: 2025-05-19 | End: 2025-05-20 | Stop reason: HOSPADM

## 2025-05-19 RX ORDER — LABETALOL HYDROCHLORIDE 5 MG/ML
20 INJECTION, SOLUTION INTRAVENOUS ONCE AS NEEDED
Status: DISCONTINUED | OUTPATIENT
Start: 2025-05-19 | End: 2025-05-20 | Stop reason: HOSPADM

## 2025-05-19 RX ORDER — CETIRIZINE HYDROCHLORIDE 10 MG/1
10 TABLET ORAL DAILY
Status: DISCONTINUED | OUTPATIENT
Start: 2025-05-20 | End: 2025-05-20 | Stop reason: HOSPADM

## 2025-05-19 RX ORDER — SODIUM CHLORIDE, SODIUM LACTATE, POTASSIUM CHLORIDE, CALCIUM CHLORIDE 600; 310; 30; 20 MG/100ML; MG/100ML; MG/100ML; MG/100ML
125 INJECTION, SOLUTION INTRAVENOUS CONTINUOUS
Status: DISCONTINUED | OUTPATIENT
Start: 2025-05-19 | End: 2025-05-20 | Stop reason: HOSPADM

## 2025-05-19 RX ADMIN — MORPHINE SULFATE 4 MG: 4 INJECTION, SOLUTION INTRAMUSCULAR; INTRAVENOUS at 21:52

## 2025-05-19 RX ADMIN — CYCLOBENZAPRINE HYDROCHLORIDE 10 MG: 10 TABLET, FILM COATED ORAL at 17:22

## 2025-05-19 RX ADMIN — MORPHINE SULFATE 2 MG: 2 INJECTION, SOLUTION INTRAMUSCULAR; INTRAVENOUS at 17:52

## 2025-05-19 RX ADMIN — MORPHINE SULFATE 2 MG: 2 INJECTION, SOLUTION INTRAMUSCULAR; INTRAVENOUS at 19:37

## 2025-05-19 SDOH — HEALTH STABILITY: MENTAL HEALTH: HAVE YOU USED ANY PRESCRIPTION DRUGS OTHER THAN PRESCRIBED IN THE PAST 12 MONTHS?: NO

## 2025-05-19 SDOH — HEALTH STABILITY: MENTAL HEALTH: SUICIDAL BEHAVIOR (LIFETIME): NO

## 2025-05-19 SDOH — HEALTH STABILITY: MENTAL HEALTH: WERE YOU ABLE TO COMPLETE ALL THE BEHAVIORAL HEALTH SCREENINGS?: YES

## 2025-05-19 SDOH — HEALTH STABILITY: MENTAL HEALTH: NON-SPECIFIC ACTIVE SUICIDAL THOUGHTS (PAST 1 MONTH): NO

## 2025-05-19 SDOH — SOCIAL STABILITY: SOCIAL INSECURITY: PHYSICAL ABUSE: DENIES

## 2025-05-19 SDOH — SOCIAL STABILITY: SOCIAL INSECURITY: HAVE YOU HAD THOUGHTS OF HARMING ANYONE ELSE?: NO

## 2025-05-19 SDOH — SOCIAL STABILITY: SOCIAL INSECURITY: ABUSE SCREEN: ADULT

## 2025-05-19 SDOH — HEALTH STABILITY: MENTAL HEALTH: WISH TO BE DEAD (PAST 1 MONTH): NO

## 2025-05-19 SDOH — SOCIAL STABILITY: SOCIAL INSECURITY: ARE THERE ANY APPARENT SIGNS OF INJURIES/BEHAVIORS THAT COULD BE RELATED TO ABUSE/NEGLECT?: NO

## 2025-05-19 SDOH — ECONOMIC STABILITY: HOUSING INSECURITY: DO YOU FEEL UNSAFE GOING BACK TO THE PLACE WHERE YOU ARE LIVING?: NO

## 2025-05-19 SDOH — SOCIAL STABILITY: SOCIAL INSECURITY: VERBAL ABUSE: DENIES

## 2025-05-19 SDOH — SOCIAL STABILITY: SOCIAL INSECURITY: ARE YOU OR HAVE YOU BEEN THREATENED OR ABUSED PHYSICALLY, EMOTIONALLY, OR SEXUALLY BY ANYONE?: NO

## 2025-05-19 SDOH — SOCIAL STABILITY: SOCIAL INSECURITY: DO YOU FEEL ANYONE HAS EXPLOITED OR TAKEN ADVANTAGE OF YOU FINANCIALLY OR OF YOUR PERSONAL PROPERTY?: NO

## 2025-05-19 SDOH — HEALTH STABILITY: MENTAL HEALTH: HAVE YOU USED ANY SUBSTANCES (CANABIS, COCAINE, HEROIN, HALLUCINOGENS, INHALANTS, ETC.) IN THE PAST 12 MONTHS?: NO

## 2025-05-19 SDOH — SOCIAL STABILITY: SOCIAL INSECURITY: DOES ANYONE TRY TO KEEP YOU FROM HAVING/CONTACTING OTHER FRIENDS OR DOING THINGS OUTSIDE YOUR HOME?: NO

## 2025-05-19 SDOH — SOCIAL STABILITY: SOCIAL INSECURITY: HAS ANYONE EVER THREATENED TO HURT YOUR FAMILY OR YOUR PETS?: NO

## 2025-05-19 ASSESSMENT — PAIN SCALES - GENERAL
PAINLEVEL_OUTOF10: 10 - WORST POSSIBLE PAIN
PAINLEVEL_OUTOF10: 7
PAINLEVEL_OUTOF10: 10 - WORST POSSIBLE PAIN
PAINLEVEL_OUTOF10: 10 - WORST POSSIBLE PAIN
PAINLEVEL_OUTOF10: 8

## 2025-05-19 ASSESSMENT — PAIN DESCRIPTION - DESCRIPTORS: DESCRIPTORS: DISCOMFORT

## 2025-05-19 ASSESSMENT — LIFESTYLE VARIABLES
HOW OFTEN DO YOU HAVE A DRINK CONTAINING ALCOHOL: NEVER
AUDIT-C TOTAL SCORE: 0
AUDIT-C TOTAL SCORE: 0
HOW OFTEN DO YOU HAVE 6 OR MORE DRINKS ON ONE OCCASION: NEVER
HOW MANY STANDARD DRINKS CONTAINING ALCOHOL DO YOU HAVE ON A TYPICAL DAY: PATIENT DOES NOT DRINK
SKIP TO QUESTIONS 9-10: 1

## 2025-05-19 NOTE — H&P
Triage H&P    Cass Ngo is a 40 y.o. year old at 21w4d who presents to triage for   Chief Complaint   Patient presents with    Abdominal Pain        Assessment/Plan:    RLQ abdominal pain  -No CVA tenderness on exam, no rebound tenderness, pain localized right periumbilical  -Pain 10/10 given flexeril and 2 mg morphine--> pain improved, pain returned 10/10, given another 2 mg morphine  -UA showed trace blood and protein and ketone, sent for culture, will treat accordingly  -pelvic ultrasound and renal ultrasound ordered for concern of ovarian torsion or nephrolithiasis, pelvic and renal ultrasound negative  -pending pain control    IUP at 21w4d   -FHR doppler 155    Maternal Well-being  - Vital signs stable and WNL  - All questions and concerns addressed       Dispo  Report given to Dr. Soto for continuation of care    Plan and tracing reviewed with Dr. Parker.      Evie Solis, APRN-CNP      Medical Problems       Problem List       Asthma    Seasonal allergies    Antepartum multigravida of advanced maternal age ([facility])    Previous baby with fetal growth restriction    Overview Signed 2025  1:01 PM by Herber Dang MD   Previous pregnancy          Cyst of right ovary    Overview Addendum 3/22/2025  7:20 AM by Herber Dang MD   9 cm, simple.           Abnormal  test    Overview Addendum 2025 10:31 AM by Herber Dang MD   NIPT at risk for T 21  Seen by mfm - possible hypoplastic L    Fetal echo to be done         Microcephaly (Multi)    13 weeks gestation of pregnancy ([facility])    Overview Signed 2025  3:25 PM by Angelita Cartagena MA   US  25  9w1d, edc 25  US  3/21/25    Desired provider in labor: [x] CNM  [x] Physician   [x] Either Acceptable  [x] Blood Products: [x] Yes, accepts [] No, needs counseling  [x] Initial BMI: 33.10   [x] Prenatal Labs:  25  [x] Cervical Cancer Screening up to date  [x] Rh status:  POSITIVE  [] Screen for  IPV and Substance Use Risk:  [x] Genetic Screening (cfDNA):  3/21/25  [x] First Trimester Anatomy Screen (11-13.6 wks): 3/21/25, NT  [] Baby ASA (initiated):  [x] Pregnancy dated by:  LMP    [x] Anatomy US: (19-20 wks)  [] Federal Sterilization consent signed (if indicated):  [] 1hr GCT at 24-28wks:  [] Rhogam (if indicated): n/a  [x] Fetal Surveillance (if indicated):  [] Tdap (27-32 wks, may be given up to 36 wks if initial window missed):   [] RSV (32-36 wks) (Sept. to end of Jan):     [] Feeding Intentions:  [] Postpartum Birth control method:   [] GBS at 36 - 37 wks:  [] 39 weeks discussion of IOL vs. Expectant management:  [x] Mode of delivery ( anticipated ): V         Congenital heart defect    Overview Addendum 5/9/2025  6:51 AM by Herber Dang MD   fetal echo consistent with a complete common atrioventricular canal defect.   Today's fetal echocardiogram demonstrated complete common atrioventricular canal defect. This is a cardiovascular anomaly or disease that is NOT expected to cause hemodynamic instability at birth, but has potential to cause hemodynamic instability. Neonatology management in DR. Non emergent cardiology consultation. For fetal echocardiogram findings of an unclear etiology, as long as the baby's clinical presentation allows, recommended a cardiology evaluation in conjunction with the neonatology and CTICU teams in [facility] to confirm fetal findings prior to ultimate disposition ([facility], CTICU or NICU*).                 Subjective   Cass Ngo is a 40 y.o. year old at 21w4d who presents to triage for right side abd pain. Patient states she woke up with upper right sided abd pain. Patient states she woke up with severe abdominal pain on right side. States that pain has been constant all day. She is rating her pain a 10/10. Reports that she tried taking motrin and tylenol for her pain this morning which did not help. She reports urinating more often than normal but no foul odor to her  urine or dysuria. States she had a normal bowel movement this morning. Reports good fetal movement. Denies any fevers, chills, nausea, vomiting, vaginal bleeding, loss of fluid. Denies HA, SOB, vision changes. Patient has history of cholecystectomy.       OB History          9    Para   5    Term   5            AB   3    Living   5         SAB   2    IAB        Ectopic   1    Multiple        Live Births   5                  Surgical History[1]     Social History     Socioeconomic History    Marital status: Single     Spouse name: Gil Kimbrough    Number of children: 4    Years of education: Not on file    Highest education level: Not on file   Occupational History    Not on file   Tobacco Use    Smoking status: Never     Passive exposure: Never    Smokeless tobacco: Never   Vaping Use    Vaping status: Never Used   Substance and Sexual Activity    Alcohol use: Not Currently    Drug use: Never    Sexual activity: Yes     Partners: Male     Birth control/protection: None   Other Topics Concern    Not on file   Social History Narrative    Not on file     Social Drivers of Health     Financial Resource Strain: Not on file   Food Insecurity: Not on file   Transportation Needs: Not on file   Physical Activity: Not on file   Stress: Not on file   Social Connections: Not on file   Intimate Partner Violence: Not on file        RX Allergies[2]     Prescriptions Prior to Admission[3]     Objective     Visit Vitals  BP (!) 150/82   Pulse 95   Temp 36.3 °C (97.3 °F) (Temporal)   Resp 18          Physical Exam  Constitutional:       Appearance: Normal appearance.   Cardiovascular:      Rate and Rhythm: Normal rate and regular rhythm.      Pulses: Normal pulses.      Heart sounds: Normal heart sounds. No murmur heard.  Pulmonary:      Effort: Pulmonary effort is normal.      Breath sounds: Normal breath sounds.   Abdominal:      General: Bowel sounds are normal.      Palpations: Abdomen is soft.      Tenderness: There  is abdominal tenderness. There is no right CVA tenderness, left CVA tenderness or rebound.          Comments: Pain localized as demonstrated above, moderate tenderness to palpation. No rebound tenderness   Skin:     General: Skin is warm and dry.      Capillary Refill: Capillary refill takes less than 2 seconds.   Neurological:      General: No focal deficit present.      Mental Status: She is alert and oriented to person, place, and time.   Psychiatric:         Mood and Affect: Mood normal.         Behavior: Behavior normal.          FHR doppler 155    Labs  Admission on 05/19/2025   Component Date Value Ref Range Status    POC Color, Urine 05/19/2025 Yellow  Straw, Yellow, Light-Yellow Final    POC Appearance, Urine 05/19/2025 Clear  Clear Final    POC Glucose, Urine 05/19/2025 TRACE (A)  NEGATIVE mg/dl Final    POC Bilirubin, Urine 05/19/2025 NEGATIVE  NEGATIVE Final    POC Ketones, Urine 05/19/2025 80 (3+) (A)  NEGATIVE mg/dl Final    POC Specific Gravity, Urine 05/19/2025 1.025  1.005 - 1.035 Final    POC Blood, Urine 05/19/2025 TRACE-Intact (A)  NEGATIVE Final    POC PH, Urine 05/19/2025 6.5  No Reference Range Established PH Final    POC Protein, Urine 05/19/2025 TRACE (A)  NEGATIVE mg/dl Final    POC Urobilinogen, Urine 05/19/2025 0.2  0.2, 1.0 EU/DL Final    Poc Nitrite, Urine 05/19/2025 NEGATIVE  NEGATIVE Final    POC Leukocytes, Urine 05/19/2025 NEGATIVE  NEGATIVE Final    Glucose 05/19/2025 165 (H)  74 - 99 mg/dL Final    Sodium 05/19/2025 134 (L)  136 - 145 mmol/L Final    Potassium 05/19/2025 3.6  3.5 - 5.3 mmol/L Final    Chloride 05/19/2025 103  98 - 107 mmol/L Final    Bicarbonate 05/19/2025 23  21 - 32 mmol/L Final    Anion Gap 05/19/2025 12  10 - 20 mmol/L Final    Urea Nitrogen 05/19/2025 7  6 - 23 mg/dL Final    Creatinine 05/19/2025 0.55  0.50 - 1.05 mg/dL Final    eGFR 05/19/2025 >90  >60 mL/min/1.73m*2 Final    Calculations of estimated GFR are performed using the 2021 CKD-EPI Study Refit  equation without the race variable for the IDMS-Traceable creatinine methods.  https://jasn.asnjournals.org/content/early/2021/09/22/ASN.7682692789    Calcium 05/19/2025 8.9  8.6 - 10.6 mg/dL Final    Albumin 05/19/2025 3.8  3.4 - 5.0 g/dL Final    Alkaline Phosphatase 05/19/2025 81  33 - 110 U/L Final    Total Protein 05/19/2025 6.6  6.4 - 8.2 g/dL Final    AST 05/19/2025 14  9 - 39 U/L Final    Bilirubin, Total 05/19/2025 0.9  0.0 - 1.2 mg/dL Final    ALT 05/19/2025 9  7 - 45 U/L Final    Patients treated with Sulfasalazine may generate falsely decreased results for ALT.    WBC 05/19/2025 10.0  4.4 - 11.3 x10*3/uL Final    nRBC 05/19/2025 0.0  0.0 - 0.0 /100 WBCs Final    RBC 05/19/2025 4.15  4.00 - 5.20 x10*6/uL Final    Hemoglobin 05/19/2025 11.9 (L)  12.0 - 16.0 g/dL Final    Hematocrit 05/19/2025 34.7 (L)  36.0 - 46.0 % Final    MCV 05/19/2025 84  80 - 100 fL Final    MCH 05/19/2025 28.7  26.0 - 34.0 pg Final    MCHC 05/19/2025 34.3  32.0 - 36.0 g/dL Final    RDW 05/19/2025 13.5  11.5 - 14.5 % Final    Platelets 05/19/2025 170  150 - 450 x10*3/uL Final                [1]   Past Surgical History:  Procedure Laterality Date    OTHER SURGICAL HISTORY  10/18/2022    Dilation and curettage    OTHER SURGICAL HISTORY  10/18/2022    Cholecystectomy    OTHER SURGICAL HISTORY  10/18/2022    Tonsillectomy with adenoidectomy    OTHER SURGICAL HISTORY  10/18/2022    Uterine polypectomy   [2] No Known Allergies  [3]   Medications Prior to Admission   Medication Sig Dispense Refill Last Dose/Taking    albuterol 90 mcg/actuation inhaler Inhale.   More than a month    loratadine (Claritin) 10 mg tablet Take by mouth.   More than a month    prenatal no115/iron/folic acid (PRENATAL 19 ORAL) Take by mouth.   5/18/2025

## 2025-05-20 VITALS
SYSTOLIC BLOOD PRESSURE: 129 MMHG | OXYGEN SATURATION: 97 % | WEIGHT: 207.23 LBS | TEMPERATURE: 97.5 F | HEART RATE: 73 BPM | HEIGHT: 66 IN | DIASTOLIC BLOOD PRESSURE: 69 MMHG | BODY MASS INDEX: 33.3 KG/M2 | RESPIRATION RATE: 18 BRPM

## 2025-05-20 PROBLEM — N83.201 RIGHT OVARIAN CYST: Status: ACTIVE | Noted: 2025-05-20

## 2025-05-20 PROBLEM — O16.9 ELEVATED BLOOD PRESSURE AFFECTING PREGNANCY, ANTEPARTUM: Status: ACTIVE | Noted: 2025-05-20

## 2025-05-20 LAB
ABO GROUP (TYPE) IN BLOOD: NORMAL
BACTERIA UR CULT: NORMAL
RH FACTOR (ANTIGEN D): NORMAL

## 2025-05-20 PROCEDURE — 3700000002 HC GENERAL ANESTHESIA TIME - EACH INCREMENTAL 1 MINUTE: Performed by: OBSTETRICS & GYNECOLOGY

## 2025-05-20 PROCEDURE — 2500000004 HC RX 250 GENERAL PHARMACY W/ HCPCS (ALT 636 FOR OP/ED)

## 2025-05-20 PROCEDURE — A58662 PR LAP,FULGURATE/EXCISE LESIONS: Performed by: ANESTHESIOLOGY

## 2025-05-20 PROCEDURE — 2500000004 HC RX 250 GENERAL PHARMACY W/ HCPCS (ALT 636 FOR OP/ED): Mod: JZ | Performed by: STUDENT IN AN ORGANIZED HEALTH CARE EDUCATION/TRAINING PROGRAM

## 2025-05-20 PROCEDURE — 2500000005 HC RX 250 GENERAL PHARMACY W/O HCPCS: Mod: JZ | Performed by: OBSTETRICS & GYNECOLOGY

## 2025-05-20 PROCEDURE — G0378 HOSPITAL OBSERVATION PER HR: HCPCS

## 2025-05-20 PROCEDURE — 2720000007 HC OR 272 NO HCPCS: Performed by: OBSTETRICS & GYNECOLOGY

## 2025-05-20 PROCEDURE — 2500000001 HC RX 250 WO HCPCS SELF ADMINISTERED DRUGS (ALT 637 FOR MEDICARE OP): Performed by: STUDENT IN AN ORGANIZED HEALTH CARE EDUCATION/TRAINING PROGRAM

## 2025-05-20 PROCEDURE — 3600000008 HC OR TIME - EACH INCREMENTAL 1 MINUTE - PROCEDURE LEVEL THREE: Performed by: OBSTETRICS & GYNECOLOGY

## 2025-05-20 PROCEDURE — 88305 TISSUE EXAM BY PATHOLOGIST: CPT | Mod: TC,MCY | Performed by: STUDENT IN AN ORGANIZED HEALTH CARE EDUCATION/TRAINING PROGRAM

## 2025-05-20 PROCEDURE — 58679 UNLISTED LAPS PX OVIDCT OVRY: CPT | Performed by: OBSTETRICS & GYNECOLOGY

## 2025-05-20 PROCEDURE — 99215 OFFICE O/P EST HI 40 MIN: CPT

## 2025-05-20 PROCEDURE — 7100000002 HC RECOVERY ROOM TIME - EACH INCREMENTAL 1 MINUTE: Performed by: OBSTETRICS & GYNECOLOGY

## 2025-05-20 PROCEDURE — 2500000005 HC RX 250 GENERAL PHARMACY W/O HCPCS

## 2025-05-20 PROCEDURE — 3600000003 HC OR TIME - INITIAL BASE CHARGE - PROCEDURE LEVEL THREE: Performed by: OBSTETRICS & GYNECOLOGY

## 2025-05-20 PROCEDURE — 7100000001 HC RECOVERY ROOM TIME - INITIAL BASE CHARGE: Performed by: OBSTETRICS & GYNECOLOGY

## 2025-05-20 PROCEDURE — 3700000001 HC GENERAL ANESTHESIA TIME - INITIAL BASE CHARGE: Performed by: OBSTETRICS & GYNECOLOGY

## 2025-05-20 PROCEDURE — 36415 COLL VENOUS BLD VENIPUNCTURE: CPT

## 2025-05-20 RX ORDER — ACETAMINOPHEN 325 MG/1
975 TABLET ORAL ONCE
Status: COMPLETED | OUTPATIENT
Start: 2025-05-20 | End: 2025-05-20

## 2025-05-20 RX ORDER — ROCURONIUM BROMIDE 10 MG/ML
INJECTION, SOLUTION INTRAVENOUS AS NEEDED
Status: DISCONTINUED | OUTPATIENT
Start: 2025-05-20 | End: 2025-05-20

## 2025-05-20 RX ORDER — NORETHINDRONE AND ETHINYL ESTRADIOL 0.5-0.035
KIT ORAL AS NEEDED
Status: DISCONTINUED | OUTPATIENT
Start: 2025-05-20 | End: 2025-05-20

## 2025-05-20 RX ORDER — FENTANYL CITRATE 50 UG/ML
INJECTION, SOLUTION INTRAMUSCULAR; INTRAVENOUS AS NEEDED
Status: DISCONTINUED | OUTPATIENT
Start: 2025-05-20 | End: 2025-05-20

## 2025-05-20 RX ORDER — NEOSTIGMINE METHYLSULFATE 1 MG/ML
INJECTION INTRAVENOUS AS NEEDED
Status: DISCONTINUED | OUTPATIENT
Start: 2025-05-20 | End: 2025-05-20

## 2025-05-20 RX ORDER — OXYCODONE HYDROCHLORIDE 5 MG/1
5 TABLET ORAL ONCE
Refills: 0 | Status: COMPLETED | OUTPATIENT
Start: 2025-05-20 | End: 2025-05-20

## 2025-05-20 RX ORDER — SODIUM CHLORIDE 0.9 G/100ML
INJECTION, SOLUTION IRRIGATION AS NEEDED
Status: DISCONTINUED | OUTPATIENT
Start: 2025-05-20 | End: 2025-05-20 | Stop reason: HOSPADM

## 2025-05-20 RX ORDER — ALBUTEROL SULFATE 0.83 MG/ML
2.5 SOLUTION RESPIRATORY (INHALATION) ONCE AS NEEDED
Status: DISCONTINUED | OUTPATIENT
Start: 2025-05-20 | End: 2025-05-20 | Stop reason: HOSPADM

## 2025-05-20 RX ORDER — POLYETHYLENE GLYCOL 3350 17 G/17G
17 POWDER, FOR SOLUTION ORAL DAILY
Qty: 30 PACKET | Refills: 0 | Status: SHIPPED | OUTPATIENT
Start: 2025-05-20 | End: 2025-08-18

## 2025-05-20 RX ORDER — CYCLOBENZAPRINE HCL 10 MG
5 TABLET ORAL 3 TIMES DAILY PRN
Qty: 15 TABLET | Refills: 0 | Status: SHIPPED | OUTPATIENT
Start: 2025-05-20

## 2025-05-20 RX ORDER — ATROPINE SULFATE 0.4 MG/ML
INJECTION, SOLUTION ENDOTRACHEAL; INTRAMEDULLARY; INTRAMUSCULAR; INTRAVENOUS; SUBCUTANEOUS AS NEEDED
Status: DISCONTINUED | OUTPATIENT
Start: 2025-05-20 | End: 2025-05-20

## 2025-05-20 RX ORDER — HYDROMORPHONE HYDROCHLORIDE 0.2 MG/ML
0.2 INJECTION INTRAMUSCULAR; INTRAVENOUS; SUBCUTANEOUS EVERY 5 MIN PRN
Status: DISCONTINUED | OUTPATIENT
Start: 2025-05-20 | End: 2025-05-20 | Stop reason: HOSPADM

## 2025-05-20 RX ORDER — SUCCINYLCHOLINE CHLORIDE 100 MG/5ML
SYRINGE (ML) INTRAVENOUS AS NEEDED
Status: DISCONTINUED | OUTPATIENT
Start: 2025-05-20 | End: 2025-05-20

## 2025-05-20 RX ORDER — OXYCODONE HYDROCHLORIDE 5 MG/1
5 TABLET ORAL EVERY 6 HOURS PRN
Qty: 10 TABLET | Refills: 0 | Status: SHIPPED | OUTPATIENT
Start: 2025-05-20

## 2025-05-20 RX ORDER — ONDANSETRON HYDROCHLORIDE 2 MG/ML
4 INJECTION, SOLUTION INTRAVENOUS ONCE AS NEEDED
Status: DISCONTINUED | OUTPATIENT
Start: 2025-05-20 | End: 2025-05-20 | Stop reason: HOSPADM

## 2025-05-20 RX ORDER — CYCLOBENZAPRINE HCL 10 MG
10 TABLET ORAL ONCE
Status: COMPLETED | OUTPATIENT
Start: 2025-05-20 | End: 2025-05-20

## 2025-05-20 RX ORDER — HYDROMORPHONE HYDROCHLORIDE 1 MG/ML
INJECTION, SOLUTION INTRAMUSCULAR; INTRAVENOUS; SUBCUTANEOUS AS NEEDED
Status: DISCONTINUED | OUTPATIENT
Start: 2025-05-20 | End: 2025-05-20

## 2025-05-20 RX ORDER — LIDOCAINE HYDROCHLORIDE 20 MG/ML
INJECTION, SOLUTION INFILTRATION; PERINEURAL AS NEEDED
Status: DISCONTINUED | OUTPATIENT
Start: 2025-05-20 | End: 2025-05-20

## 2025-05-20 RX ORDER — SODIUM CHLORIDE, SODIUM LACTATE, POTASSIUM CHLORIDE, CALCIUM CHLORIDE 600; 310; 30; 20 MG/100ML; MG/100ML; MG/100ML; MG/100ML
INJECTION, SOLUTION INTRAVENOUS CONTINUOUS PRN
Status: DISCONTINUED | OUTPATIENT
Start: 2025-05-20 | End: 2025-05-20

## 2025-05-20 RX ORDER — ACETAMINOPHEN 500 MG
1000 TABLET ORAL EVERY 6 HOURS PRN
Qty: 112 TABLET | Refills: 0 | Status: SHIPPED | OUTPATIENT
Start: 2025-05-20

## 2025-05-20 RX ORDER — PROPOFOL 10 MG/ML
INJECTION, EMULSION INTRAVENOUS AS NEEDED
Status: DISCONTINUED | OUTPATIENT
Start: 2025-05-20 | End: 2025-05-20

## 2025-05-20 RX ORDER — PHENYLEPHRINE HCL IN 0.9% NACL 0.4MG/10ML
SYRINGE (ML) INTRAVENOUS AS NEEDED
Status: DISCONTINUED | OUTPATIENT
Start: 2025-05-20 | End: 2025-05-20

## 2025-05-20 RX ORDER — LIDOCAINE HYDROCHLORIDE 10 MG/ML
0.1 INJECTION, SOLUTION INFILTRATION; PERINEURAL ONCE
Status: DISCONTINUED | OUTPATIENT
Start: 2025-05-20 | End: 2025-05-20 | Stop reason: HOSPADM

## 2025-05-20 RX ORDER — ACETAMINOPHEN 325 MG/1
650 TABLET ORAL EVERY 4 HOURS PRN
Status: DISCONTINUED | OUTPATIENT
Start: 2025-05-20 | End: 2025-05-20 | Stop reason: HOSPADM

## 2025-05-20 RX ADMIN — LIDOCAINE HYDROCHLORIDE 100 MG: 20 INJECTION, SOLUTION INFILTRATION; PERINEURAL at 00:56

## 2025-05-20 RX ADMIN — SODIUM CHLORIDE, SODIUM LACTATE, POTASSIUM CHLORIDE, AND CALCIUM CHLORIDE: 600; 310; 30; 20 INJECTION, SOLUTION INTRAVENOUS at 02:04

## 2025-05-20 RX ADMIN — Medication 160 MCG: at 01:24

## 2025-05-20 RX ADMIN — ROCURONIUM BROMIDE 30 MG: 10 INJECTION, SOLUTION INTRAVENOUS at 01:16

## 2025-05-20 RX ADMIN — ATROPINE SULFATE 2 MG: 0.4 INJECTION, SOLUTION INTRAVENOUS at 02:04

## 2025-05-20 RX ADMIN — Medication 160 MG: at 00:59

## 2025-05-20 RX ADMIN — ACETAMINOPHEN 975 MG: 325 TABLET ORAL at 04:07

## 2025-05-20 RX ADMIN — DEXAMETHASONE SODIUM PHOSPHATE 4 MG: 4 INJECTION INTRA-ARTICULAR; INTRALESIONAL; INTRAMUSCULAR; INTRAVENOUS; SOFT TISSUE at 02:00

## 2025-05-20 RX ADMIN — NEOSTIGMINE METHYLSULFATE 5 MG: 1 INJECTION INTRAVENOUS at 02:04

## 2025-05-20 RX ADMIN — OXYCODONE 5 MG: 5 TABLET ORAL at 08:55

## 2025-05-20 RX ADMIN — Medication 120 MCG: at 01:20

## 2025-05-20 RX ADMIN — ROCURONIUM BROMIDE 10 MG: 10 INJECTION, SOLUTION INTRAVENOUS at 01:48

## 2025-05-20 RX ADMIN — FENTANYL CITRATE 100 MCG: 50 INJECTION, SOLUTION INTRAMUSCULAR; INTRAVENOUS at 00:58

## 2025-05-20 RX ADMIN — EPHEDRINE SULFATE 5 MG: 50 INJECTION INTRAVENOUS at 01:34

## 2025-05-20 RX ADMIN — HYDROMORPHONE HYDROCHLORIDE 0.4 MG: 1 INJECTION, SOLUTION INTRAMUSCULAR; INTRAVENOUS; SUBCUTANEOUS at 02:02

## 2025-05-20 RX ADMIN — PROPOFOL 200 MG: 10 INJECTION, EMULSION INTRAVENOUS at 00:58

## 2025-05-20 RX ADMIN — SODIUM CHLORIDE, POTASSIUM CHLORIDE, SODIUM LACTATE AND CALCIUM CHLORIDE: 600; 310; 30; 20 INJECTION, SOLUTION INTRAVENOUS at 01:06

## 2025-05-20 RX ADMIN — EPHEDRINE SULFATE 5 MG: 50 INJECTION INTRAVENOUS at 01:28

## 2025-05-20 RX ADMIN — HYDROMORPHONE HYDROCHLORIDE 0.2 MG: 1 INJECTION, SOLUTION INTRAMUSCULAR; INTRAVENOUS; SUBCUTANEOUS at 02:12

## 2025-05-20 RX ADMIN — SODIUM CHLORIDE, SODIUM LACTATE, POTASSIUM CHLORIDE, AND CALCIUM CHLORIDE: 600; 310; 30; 20 INJECTION, SOLUTION INTRAVENOUS at 00:49

## 2025-05-20 RX ADMIN — ONDANSETRON 4 MG: 2 INJECTION, SOLUTION INTRAMUSCULAR; INTRAVENOUS at 02:06

## 2025-05-20 RX ADMIN — Medication 120 MCG: at 01:13

## 2025-05-20 RX ADMIN — CYCLOBENZAPRINE 10 MG: 10 TABLET, FILM COATED ORAL at 04:07

## 2025-05-20 RX ADMIN — OXYCODONE 5 MG: 5 TABLET ORAL at 04:53

## 2025-05-20 ASSESSMENT — PAIN SCALES - GENERAL
PAINLEVEL_OUTOF10: 0 - NO PAIN
PAINLEVEL_OUTOF10: 3
PAIN_LEVEL: 0
PAINLEVEL_OUTOF10: 6
PAINLEVEL_OUTOF10: 0 - NO PAIN
PAINLEVEL_OUTOF10: 6
PAINLEVEL_OUTOF10: 4

## 2025-05-20 ASSESSMENT — PAIN - FUNCTIONAL ASSESSMENT
PAIN_FUNCTIONAL_ASSESSMENT: 0-10
PAIN_FUNCTIONAL_ASSESSMENT: UNABLE TO SELF-REPORT
PAIN_FUNCTIONAL_ASSESSMENT: UNABLE TO SELF-REPORT
PAIN_FUNCTIONAL_ASSESSMENT: 0-10
PAIN_FUNCTIONAL_ASSESSMENT: 0-10

## 2025-05-20 ASSESSMENT — ACTIVITIES OF DAILY LIVING (ADL)
LACK_OF_TRANSPORTATION: NO
LACK_OF_TRANSPORTATION: NO

## 2025-05-20 ASSESSMENT — PAIN DESCRIPTION - DESCRIPTORS
DESCRIPTORS: DISCOMFORT
DESCRIPTORS: BURNING

## 2025-05-20 NOTE — ANESTHESIA PROCEDURE NOTES
Peripheral IV  Date/Time: 5/20/2025 1:06 AM      Placement  Needle size: 18 G  Laterality: left  Location: hand  Local anesthetic: none  Site prep: chlorhexidine  Technique: anatomical landmarks

## 2025-05-20 NOTE — ANESTHESIA PROCEDURE NOTES
Airway  Date/Time: 5/20/2025 1:00 AM  Reason: elective    Airway not difficult    Staffing  Performed: resident   Authorized by: Dona Mc MD    Performed by: Rick Ceja DO  Patient location during procedure: OR    Patient Condition  Indications for airway management: anesthesia  Patient position: sniffing  Planned trial extubation  Sedation level: deep     Final Airway Details   Preoxygenated: yes  Final airway type: endotracheal airway  Successful airway: ETT  Cuffed: yes   Successful intubation technique: direct laryngoscopy  Adjuncts used in placement: intubating stylet  Blade size: #3  ETT size (mm): 7.0  Cormack-Lehane Classification: grade I - full view of glottis  Placement verified by: chest auscultation and capnometry   Measured from: lips  ETT to lips (cm): 22  Number of attempts at approach: 1    Additional Comments  RSI with cricoid. Glidescope S3 used. Easy intubation, atraumatic, grade 1 view. No aspiration observed

## 2025-05-20 NOTE — OP NOTE
Date: 2025  OR Location: [facility] OR    Name: Cass Ngo : 1985, Age: 40 y.o., MRN: [id number], Sex: female    Diagnosis  Pre-op Diagnosis      * Cyst of right ovary [N83.201] Post-op Diagnosis     * Cyst of right ovary [N83.201]     Procedures  DIAGNOSTIC LAPAROSCOPY; RIGHT OVARIAN DETORSION AND DRAINAGE OF CYST  64463 - UT LAPS ABD PRTM&OMENTUM DX W/WO SPEC BR/WA SPX    Surgeons      * Greer Parker - Primary    Resident/Fellow/Other Assistant:  Surgeons and Role:     * Anju Farmer MD - Resident - Assisting    Staff:   Circulator: Gloria Alcala Person: Lisa    Anesthesia Staff: Anesthesiologist: Dona Mc MD  Anesthesia Resident: Rick Ceja DO    Procedure Summary  Anesthesia: General  ASA: III  Estimated Blood Loss: 5mL  Intra-op Medications:   Administrations occurring from 0000 to 0205 on 25:   Medication Name Total Dose   sodium chloride 0.9 % irrigation solution 500 mL   atropine injection 0.4 mg/mL 2 mg   dexAMETHasone (Decadron) 4 mg/mL 4 mg   ePHEDrine 50 mg/mL 10 mg   fentaNYL (Sublimaze) injection 50 mcg/mL 100 mcg   HYDROmorphone (Dilaudid) injection 1 mg/mL 0.4 mg   LR bolus Cannot be calculated   LR infusion Cannot be calculated   lidocaine (Xylocaine) injection 2 % 100 mg   neostigmine (Bloxiverz) 10 mg/10 mL injection 5 mg   phenylephrine 40 mcg/mL syringe 10 mL 400 mcg   propofol (Diprivan) injection 10 mg/mL 200 mg   rocuronium (ZeMuron) 50 mg/5 mL injection 40 mg   succinylcholine 100 mg/5 mL SYRINGE 160 mg              Anesthesia Record               Intraprocedure I/O Totals          Intake    LR bolus 1000.00 mL    LR infusion 800.00 mL    Total Intake 1800 mL       Output    Urine 175 mL    Est. Blood Loss 5 mL    Total Output 180 mL       Net    Net Volume 1620 mL          Specimen:   ID Type Source Tests Collected by Time   1 : RIGHT OVARIAN CYSTIC FLUID Non-Gynecologic Cytology CYST FLUID CYTOLOGY CONSULTATION (NON-GYNECOLOGIC) Greer  CARLOS A Parker MD 5/20/2025 0204     Indication: Patient presented to OB ED for second episode of severe abdominal pain in pregnancy, not controlled by home medications or IV pain meds. Had known R ovarian cyst. Pelvic US with preserved blood flow to bilateral ovaries. However, given significant pain and otherwise negative workup, there was still concern for torsion and decision was made to proceed to the OR for dx lap     Procedure Details:  The patient was seen in the preoperative area. The site of surgery was properly noted/marked if necessary per policy. The patient has been actively warmed in preoperative area. Preoperative antibiotics are not indicated. Venous thrombosis prophylaxis have been ordered including bilateral sequential compression devices    Findings: On abdominal entry, no evidence of visceral or vascular injury. Brief abdominal survey without notable findings. Normal appearing gravid uterus. Left fallopian tube and ovary not visualized. Right ovary with large simple-appearing cyst torsed around right fallopian tube and uteroovarian ligament x1. Right fallopian tube normal appearing and ovarian tissue otherwise healthy appearing    The patient was brought to the OR and general anesthesia was administered. The patient was then placed in the dorsal lithotomy position with a 30-degree left lateral tilt. Next, the patient was prepped and draped in the normal sterile fashion. A Brumfield catheter was placed.    The patient was temporarily taken out of left lateral tilt. A 5mm skin incision was made at Dobson's point and the abdomen was entered using the Veress needle in the standard fashion. Entry into the abdominal cavity was verified with a normal starting pressure. The abdomen was insufflated to 12mmHg. A 5mm port was then placed at Dobson's point under direct visualization with the Optiview trocar and 5mm laparoscope. A 10mm port was then placed 4cm above the umbilicus under direct visualization and a  5mm port was placed in the right upper quadrant under direct visualization. The patient was then placed back in left lateral tilt and in steep trendelenburg position.    A brief intra-abdominal survey revealed findings as above.    The right ovary was grasped with a bowel grasper and detorsed. The cyst appeared simple. Based on positioning of the uterus and bowels in proximity to the cyst, it was determined that cystectomy would take significantly more time under anesthesia and pose higher risks; decision was made to drain the cyst. The cyst was grasped with a laparoscopic Allis and a laparoscopic needle was introduced into the abdomen and used to aspirate cyst contents. Approx 150cc clear, straw colored fluid was aspirated from the cyst. After drainage, the cyst was hemostatic. The ovary was placed back inside the pelvis.    A final survey of the abdomen showed hemostasis. The laparoscopic ports were removed. The fascia was closed at the supraumbilical incision with 0-Vicryl and the skin incisions were closed with subcutaneous sutures of 4-0 Monocryl. The randall catheter was removed. The patient was taken back to the PACU in stable condition following reversal of anesthesia. Dr. Parker was present for all key portions of the procedure.    Complications:  None; patient tolerated the procedure well.     Disposition: PACU - hemodynamically stable.  Condition: stable    Anju Farmer MD  PGY-4, Obstetrics and Gynecology

## 2025-05-20 NOTE — SIGNIFICANT EVENT
"Pre-Discharge Assessment    S: Patient doing well at bedside. Pain well controlled. Reports no cramping/ctx, vaginal bleeding or LOF. No N/V, has tolerated some PO since surgery    O:   Visit Vitals  /72   Pulse 84   Temp 36.4 °C (97.5 °F) (Temporal)   Resp 18   Ht 1.676 m (5' 6\")   Wt 94 kg (207 lb 3.7 oz)   LMP 12/19/2024   SpO2 96%   BMI 33.45 kg/m²   OB Status Pregnant   Smoking Status Never   BSA 2.09 m²      Physical Examination  General: no acute distress  HEENT: normocephalic, atraumatic  Heart: warm and well perfused  Lungs: breathing comfortably on room air  Abdomen: soft, gravid, appropriately tender postoperatively, laparoscopic incision sites c/d/I, covered w/ dermabond  Extremities: moving all extremities  Neuro: awake and conversant  Psych: appropriate mood and affect       A/P    Postoperative State  -Doing well, meeting all postoperative milestones  -Discharge prepped, homegoing medications sent  -No obstetric concerns at time of discharge, plan to follow up with primary OB Dr. Dang on 5/30.   -Maternal VSS, plan for discharge home    Donaldo Chapman MD PGY-4  "
"Updated Plan of Care, Decision for Surgery    Subjective: Patient reporting continued pain after 4mg morphine. Desires surgical intervention    Objective:  Vitals: BP (!) 178/96   Pulse 85   Temp 36.3 °C (97.3 °F) (Temporal)   Resp 18   Ht 1.676 m (5' 6\")   Wt 94 kg (207 lb 3.7 oz)   LMP 2024   SpO2 99%   BMI 33.45 kg/m²     Renal US neg, pelvic US n/f stable R ovarian cyst with preserved blood flow    Assessment/Plan:  Cass Ngo is a 40 y.o.  at 21w4d undergoing evaluation for R ovarian cyst and abdominal pain    - Consented for dx lap, ovarian cystectomy, possible unilateral oophorectomy. Discussed risks and benefits of surgery in pregnancy - including increased risk of bleeding and increased risk of fetal loss though overall low risk - and that surgery may not completely resolve her pain symptoms  - Plan to proceed with surgery overnight - although not an acute abdomen and torsion not noted on US, cannot completely rule out, and given significant pain and second presentation to OB ED with these symptoms, would recommend urgent surgical intervention. Based on OR availability and safety, okay to wait for NPO time (midnight)  - Continue to treat pain PRN  - Plan for dopp tones pre/postop  - Likely can be discharged after postop recovery    Seen and d/w Dr. Elena Farmer MD  PGY-4, Obstetrics and Gynecology  "
FAMILY HISTORY:  No pertinent family history in first degree relatives

## 2025-05-20 NOTE — ANESTHESIA POSTPROCEDURE EVALUATION
Patient: Cass Ngo    Procedure Summary       Date: 05/20/25 Room / Location: [facility] OR 17 / [facility] OR    Anesthesia Start: 0050 Anesthesia Stop: 0231    Procedure: DIAGNOSTIC LAPAROSCOPY; RIGHT OVARIAN DETORSION AND DRAINAGE OF CYST Diagnosis:       Cyst of right ovary      (Cyst of right ovary [N83.201])    Surgeons: Greer Parker MD Responsible Provider: Dona Mc MD    Anesthesia Type: general ASA Status: 3            Anesthesia Type: general    Vitals Value Taken Time   /71 05/20/25 02:33   Temp 36.5 05/20/25 02:33   Pulse 74 05/20/25 02:28   Resp 12 05/20/25 02:28   SpO2 96 % 05/20/25 02:28   Vitals shown include unfiled device data.    Anesthesia Post Evaluation    Patient location during evaluation: PACU  Patient participation: complete - patient participated  Level of consciousness: awake and alert  Pain score: 0  Pain management: adequate  Airway patency: patent  Cardiovascular status: acceptable  Respiratory status: acceptable  Hydration status: acceptable  Postoperative Nausea and Vomiting: none  Comments: Per Pacu nurse , Surgical team told him ,they will do post op fetal monitor  when the patient arrives to the floor         No notable events documented.

## 2025-05-20 NOTE — ANESTHESIA PROCEDURE NOTES
Peripheral IV  Date/Time: 5/20/2025 1:08 AM      Placement  Needle size: 18 G  Laterality: right  Location: hand  Local anesthetic: none  Site prep: chlorhexidine  Technique: anatomical landmarks

## 2025-05-22 LAB
LABORATORY COMMENT REPORT: NORMAL
LABORATORY COMMENT REPORT: NORMAL
PATH REPORT.FINAL DX SPEC: NORMAL
PATH REPORT.GROSS SPEC: NORMAL
PATH REPORT.RELEVANT HX SPEC: NORMAL
PATH REPORT.TOTAL CANCER: NORMAL

## 2025-05-30 ENCOUNTER — APPOINTMENT (OUTPATIENT)
Dept: OBSTETRICS AND GYNECOLOGY | Facility: CLINIC | Age: 40
End: 2025-05-30
Payer: COMMERCIAL

## 2025-05-30 ENCOUNTER — APPOINTMENT (OUTPATIENT)
Dept: RADIOLOGY | Facility: HOSPITAL | Age: 40
End: 2025-05-30
Payer: COMMERCIAL

## 2025-05-30 VITALS — BODY MASS INDEX: 34.06 KG/M2 | SYSTOLIC BLOOD PRESSURE: 108 MMHG | DIASTOLIC BLOOD PRESSURE: 74 MMHG | WEIGHT: 211 LBS

## 2025-05-30 DIAGNOSIS — O09.529 ANTEPARTUM MULTIGRAVIDA OF ADVANCED MATERNAL AGE (HHS-HCC): ICD-10-CM

## 2025-05-30 DIAGNOSIS — N83.201 RIGHT OVARIAN CYST: ICD-10-CM

## 2025-05-30 DIAGNOSIS — Z3A.23 23 WEEKS GESTATION OF PREGNANCY (HHS-HCC): ICD-10-CM

## 2025-05-30 DIAGNOSIS — Z87.59 PREVIOUS BABY WITH FETAL GROWTH RESTRICTION: Primary | ICD-10-CM

## 2025-05-30 DIAGNOSIS — J45.20 MILD INTERMITTENT ASTHMA, UNSPECIFIED WHETHER COMPLICATED (HHS-HCC): ICD-10-CM

## 2025-05-30 DIAGNOSIS — Q24.9 CONGENITAL HEART DEFECT: ICD-10-CM

## 2025-05-30 PROBLEM — Z3A.13 13 WEEKS GESTATION OF PREGNANCY (HHS-HCC): Status: RESOLVED | Noted: 2025-04-30 | Resolved: 2025-05-30

## 2025-05-30 PROCEDURE — 0501F PRENATAL FLOW SHEET: CPT | Performed by: OBSTETRICS & GYNECOLOGY

## 2025-05-30 NOTE — PROGRESS NOTES
Feels well today.  Recovering from laparoscopic right ovarian cyst drainage.  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  Discussed potential for recurrence and need for further intervention, hopefully after pregnancy.    Has follow-up growth ultrasound with maternal-fetal medicine.  1 hour glucose testing 4 weeks.    Ob Visit  25     SUBJECTIVE      HPI: Cass Ngo is a 40 y.o.  at 23w1d here for RPNV.       OBJECTIVE  Visit Vitals  /74   Wt 95.7 kg (211 lb)   LMP 2024   BMI 34.06 kg/m²   OB Status Pregnant   Smoking Status Never   BSA 2.11 m²            ASSESSMENT & PLAN    Cass Ngo is a 40 y.o.  at 23w1d here for the following concerns we addressed today:    Problem List Items Addressed This Visit          Ob-Gyn Problems    [facility]    Previous baby with fetal growth restriction - Primary    Overview   Previous pregnancy          Right ovarian cyst    Overview   S/p laparoscopic drainage          23 weeks gestation of pregnancy ([facility])    Overview   Desired provider in labor: [] CNM  [] Physician   [] Either Acceptable  [] Blood Products: [] Yes, accepts [] No, needs counseling  [x] Initial BMI: 33.10   [] Prenatal Labs:   [] Cervical Cancer Screening up to date  [] Rh status:   [] Screen for IPV and Substance Use Risk:  [] Genetic Screening (cfDNA):    [] First Trimester Anatomy Screen (11-13.6 wks):  [] Baby ASA (initiated):  [] Pregnancy dated by:     [] Anatomy US: (19-20 wks)  [] Federal Sterilization consent signed (if indicated):  [] 1hr GCT at 24-28wks:  [] Rhogam (if indicated):   [] Fetal Surveillance (if indicated):  [] Tdap (27-32 wks, may be given up to 36 wks if initial window missed):   [] RSV (32-36 wks) (Sept. to end ):     [] Feeding Intentions:  [] Postpartum Birth control method:   [] GBS at 36 - 37 wks:  [] 39 weeks discussion of IOL vs. Expectant management:  [] Mode of delivery ( anticipated ):              Other    Asthma    Congenital heart defect    Overview   fetal echo consistent with a complete common atrioventricular canal defect.   Today's fetal echocardiogram demonstrated complete common atrioventricular canal defect. This is a cardiovascular anomaly or disease that is NOT expected to cause hemodynamic instability at birth, but has potential to cause hemodynamic instability. Neonatology management in . Non emergent cardiology consultation. For fetal echocardiogram findings of an unclear etiology, as long as the baby's clinical presentation allows, recommended a cardiology evaluation in conjunction with the neonatology and CTICU teams in [facility] to confirm fetal findings prior to ultimate disposition ([facility], CTICU or NICU*).               RTC in 4 weeks      Herber Dang MD

## 2025-06-09 ENCOUNTER — HOSPITAL ENCOUNTER (OUTPATIENT)
Dept: RADIOLOGY | Facility: HOSPITAL | Age: 40
Discharge: HOME | End: 2025-06-09
Payer: COMMERCIAL

## 2025-06-09 DIAGNOSIS — Z3A.13 13 WEEKS GESTATION OF PREGNANCY (HHS-HCC): ICD-10-CM

## 2025-06-09 PROCEDURE — 76816 OB US FOLLOW-UP PER FETUS: CPT

## 2025-06-09 PROCEDURE — 76816 OB US FOLLOW-UP PER FETUS: CPT | Performed by: STUDENT IN AN ORGANIZED HEALTH CARE EDUCATION/TRAINING PROGRAM

## 2025-06-19 ENCOUNTER — APPOINTMENT (OUTPATIENT)
Dept: PEDIATRIC CARDIOLOGY | Facility: CLINIC | Age: 40
End: 2025-06-19
Payer: COMMERCIAL

## 2025-06-19 ENCOUNTER — ANCILLARY PROCEDURE (OUTPATIENT)
Dept: PEDIATRIC CARDIOLOGY | Facility: CLINIC | Age: 40
End: 2025-06-19
Payer: COMMERCIAL

## 2025-06-19 VITALS
BODY MASS INDEX: 32.91 KG/M2 | HEART RATE: 95 BPM | DIASTOLIC BLOOD PRESSURE: 84 MMHG | SYSTOLIC BLOOD PRESSURE: 136 MMHG | HEIGHT: 67 IN | WEIGHT: 209.66 LBS

## 2025-06-19 DIAGNOSIS — Q24.9 CONGENITAL HEART DEFECT: ICD-10-CM

## 2025-06-19 DIAGNOSIS — O35.BXX0 ABNORMAL FETAL ECHOCARDIOGRAPHY AFFECTING ANTEPARTUM CARE OF MOTHER, SINGLE OR UNSPECIFIED FETUS (HHS-HCC): ICD-10-CM

## 2025-06-19 DIAGNOSIS — O35.8XX0 MATERNAL CARE FOR OTHER (SUSPECTED) FETAL ABNORMALITY AND DAMAGE, NOT APPLICABLE OR UNSPECIFIED (HHS-HCC): ICD-10-CM

## 2025-06-19 DIAGNOSIS — O35.BXX0 ABNORMAL FETAL ECHOCARDIOGRAPHY AFFECTING ANTEPARTUM CARE OF MOTHER, SINGLE OR UNSPECIFIED FETUS (HHS-HCC): Primary | ICD-10-CM

## 2025-06-19 DIAGNOSIS — Q02 MICROCEPHALY (MULTI): ICD-10-CM

## 2025-06-19 PROCEDURE — 3079F DIAST BP 80-89 MM HG: CPT | Performed by: PEDIATRICS

## 2025-06-19 PROCEDURE — 3075F SYST BP GE 130 - 139MM HG: CPT | Performed by: PEDIATRICS

## 2025-06-19 PROCEDURE — 93325 DOPPLER ECHO COLOR FLOW MAPG: CPT | Performed by: PEDIATRICS

## 2025-06-19 PROCEDURE — 76825 ECHO EXAM OF FETAL HEART: CPT | Performed by: PEDIATRICS

## 2025-06-19 PROCEDURE — 3008F BODY MASS INDEX DOCD: CPT | Performed by: PEDIATRICS

## 2025-06-19 PROCEDURE — 76827 ECHO EXAM OF FETAL HEART: CPT | Performed by: PEDIATRICS

## 2025-06-19 PROCEDURE — 99215 OFFICE O/P EST HI 40 MIN: CPT | Performed by: PEDIATRICS

## 2025-06-19 NOTE — LETTER
2025     Herber Dang MD  [address] [address]  [place] [address]    Patient: Cass Ngo   YOB: 1985   Date of Visit: 2025       Dear Dr. Herber Dang MD:    Thank you for referring Cass Ngo to me for evaluation. Below are my notes for this consultation.  If you have questions, please do not hesitate to call me. I look forward to following your patient along with you.       Sincerely,     Lex Farias MD      CC: MD Shannan Holt, APRN-CN, [facility]  Sofia Ulrich, APRN-CNP  ______________________________________________________________________________________    Cass Ngo was seen at the request of * No referring provider recorded for this case * for a chief complaint of AMA, increased risk of trisomy 21 on NIPT, and possible HLHS on 15 3/7 week obstetric scan; a report with my findings is being sent via written or electronic means the referring physician with my recommendations for treatment.     I had the pleasure of seeing Cass Ngo in Pediatric Cardiology consultation at our [facility] location as part of our prenatal heart program for for follow up of a fetal echocardiogram on 25 that showed an AV canal. She was initially referred for AMA, increased risk of trisomy 21 on NIPT, and possible HLHS on 15 3/7 week obstetric scan.  She is a 40 y.o. year-old  woman, currently 26w0d weeks gestation. Patient's last menstrual period was 2024. Estimated Date of Delivery: 25.  There have been no pregnancy complications.   She has not been hospitalized during this pregnancy.  She had a NIPT, which indicated an increased risk of trisomy 21 (98.29% PPV).  She has had a second trimester ultrasound that showed an AV canal and pleural effusion.      Prior to the visit, I personally reviewed the cardiac portions of the obstetrical ultrasound performed on 25.  There appears to be a complete  "common atrioventricular canal defect that is balanced at the atrial and ventricular level. There is no evidence of right or left ventricular outflow obstruction or significant valvular regurgitation.    Her previous obstetrical history is significant for 5 full term deliveries, 2 miscarriages, and 1 ectopic pregnancy.  Her past medical history is significant for asthma.  She has no history of congenital heart disease, arrhythmia, cardiomyopathy, hypercholesterolemia, hypertension, diabetes, rheumatic heart disease, cancer, lupus, Sjogren syndrome, clotting disorder, depression, anxiety, alcohol abuse, phenylketonuria, or DiGeorge.  She has had a cholecystectomy.  She is not taking any medications.  She has no known allergies.  She is currently taking prenatal vitamins.      Her family history is negative for congenital heart disease, early atherosclerosis, sudden cardiac death, long QT syndrome, cardiomyopathy, aortic aneurysm, or genetic or metabolic disease.  FOB states that his family history is negative for all the above as well.    She currently lives with her fiance and 5 children.  She works as an NP at [employer].  She does not smoke.  She denies illicit drug use or alcohol abuse.  She denies verbal, sexual, or physical abuse.     Delivery Hospital: [facility]  Father of the baby's name: Luis E    /84 (BP Location: Right arm, Patient Position: Sitting)   Pulse 95   Ht 1.699 m (5' 6.89\")   Wt 95.1 kg (209 lb 10.5 oz)   LMP 12/19/2024   BMI 32.95 kg/m²     She was resting comfortably in the examination room and alert, active and in no respiratory distress. Skin was without rash.  HEENT: moist mucous membranes, no JVD, goiter. Breathing is not labored.  She was acyanotic.  There was no peripheral edema.   The abdomen was gravid, soft, nontender with normal bowel sounds.  The liver was not palpable.  The spleen tip was not palpable.  She had a normal gait and normal strength in all extremities.  Cranial nerves " II - XII are intact.  She had no clubbing, cyanosis, or edema.    Two-dimensional sector scan and Doppler fetal echocardiogram performed at 26w0d weeks gestation show segmental anatomy S,D,S with complete atrioventricular canal defect and a persistent left superior vena cava. There is no atrial dilation. The foramen ovale is patent. There is a moderate sized primum atrial septal defect. At least 2 pulmonary veins are seen returning normally to the left atrium. 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  The left SVC drains to a dilated coronary sinus.  The inferior vena cava returns normally to the right atrium. There is a common atrioventricular valve; with trivial atrioventricular valve regurgitation. The valve is balanced over both ventricles.  There is a large inlet ventricular septal defect.  The left and right ventricular size and shortening are normal. No left ventricular outflow tract or right ventricular outflow tract obstruction. There is a left aortic arch. The aortic and ductal arches are patent. The fetal heart rate was within normal limits without ectopy or arrhythmia seen.  The spectral Doppler patterns were normal across all valves.  The spectral Doppler patterns of the ductal arch, aortic arch, aortic isthmus and umbilical artery were within normal limits.  There is a physiologic pericardial effusion.  Please see report for details.    In summary, Cass is a 40 y.o.  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 The defect appears balanced at the atrial and ventricular level. The diagnosis was discussed in detail with the parents including medical management in the post- period and surgical intervention, which generally occurs around 4-6 months of age.   In the meantime, we did not make any changes to her current delivery plan.  We did not prescribe any medications.  We  did not recommend intervention.  As always, we recommend a heart healthy lifestyle.  I recommend follow up fetal echocardiogram in the early 3rd trimester. We recommend a cardiology consultation within 24 hours after birth.  Disposition will depend to either the NICU or  nursery will depend on this evaluation.  The findings and limitations of fetal echocardiography were explained.    LOC: 2  Today's fetal echocardiogram demonstrated complete common atrioventricular canal defect.  This is a cardiovascular anomaly or disease that is NOT expected to cause hemodynamic instability at birth, but has potential to cause hemodynamic instability.  Neonatology management in DR.  Non emergent cardiology consultation.  For fetal echocardiogram findings of an unclear etiology, as long as the baby's clinical presentation allows, recommended a cardiology evaluation in conjunction with the neonatology and CTICU teams in [facility] to confirm fetal findings prior to ultimate disposition ([facility], CTICU or NICU*).       Scribe’s statement: I, Sofia Ulrich CNP, am scribing for, and in the presence of, Dr. Farias.    Thank you for allowing me to participate in Cass's care.  If you have any further questions, please do not hesitate to contact me.     Lex Farias M.D.  [facility], Director  Ambulatory Pediatric Cardiology   Division of Pediatric Cardiology  [facility]  [facility]   of Pediatrics, [facility]  [facility] -   [address]  [place] [address]  Office:  [phone]  Fax:       [phone]  e-mail:  [email or web address]

## 2025-06-19 NOTE — PROGRESS NOTES
Cass Ngo was seen at the request of * No referring provider recorded for this case * for a chief complaint of AMA, increased risk of trisomy 21 on NIPT, and possible HLHS on 15 3/7 week obstetric scan; a report with my findings is being sent via written or electronic means the referring physician with my recommendations for treatment.     I had the pleasure of seeing Cass Ngo in Pediatric Cardiology consultation at our [facility] location as part of our prenatal heart program for for follow up of a fetal echocardiogram on 25 that showed an AV canal. She was initially referred for AMA, increased risk of trisomy 21 on NIPT, and possible HLHS on 15 3/7 week obstetric scan.  She is a 40 y.o. year-old  woman, currently 26w0d weeks gestation. Patient's last menstrual period was 2024. Estimated Date of Delivery: 25.  There have been no pregnancy complications.   She has not been hospitalized during this pregnancy.  She had a NIPT, which indicated an increased risk of trisomy 21 (98.29% PPV).  She has had a second trimester ultrasound that showed an AV canal and pleural effusion.      Prior to the visit, I personally reviewed the cardiac portions of the obstetrical ultrasound performed on 25.  There appears to be a complete common atrioventricular canal defect that is balanced at the atrial and ventricular level. There is no evidence of right or left ventricular outflow obstruction or significant valvular regurgitation.    Her previous obstetrical history is significant for 5 full term deliveries, 2 miscarriages, and 1 ectopic pregnancy.  Her past medical history is significant for asthma.  She has no history of congenital heart disease, arrhythmia, cardiomyopathy, hypercholesterolemia, hypertension, diabetes, rheumatic heart disease, cancer, lupus, Sjogren syndrome, clotting disorder, depression, anxiety, alcohol abuse, phenylketonuria, or DiGeorge.  She has had a cholecystectomy.  " She is not taking any medications.  She has no known allergies.  She is currently taking prenatal vitamins.      Her family history is negative for congenital heart disease, early atherosclerosis, sudden cardiac death, long QT syndrome, cardiomyopathy, aortic aneurysm, or genetic or metabolic disease.  EBONI states that his family history is negative for all the above as well.    She currently lives with her fiance and 5 children.  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  She denies illicit drug use or alcohol abuse.  She denies verbal, sexual, or physical abuse.     Delivery [facility]  Father of the baby's name: Luis E    /84 (BP Location: Right arm, Patient Position: Sitting)   Pulse 95   Ht 1.699 m (5' 6.89\")   Wt 95.1 kg (209 lb 10.5 oz)   LMP 12/19/2024   BMI 32.95 kg/m²     She was resting comfortably in the examination room and alert, active and in no respiratory distress. Skin was without rash.  HEENT: moist mucous membranes, no JVD, goiter. Breathing is not labored.  She was acyanotic.  There was no peripheral edema.   The abdomen was gravid, soft, nontender with normal bowel sounds.  The liver was not palpable.  The spleen tip was not palpable.  She had a normal gait and normal strength in all extremities.  Cranial nerves II - XII are intact.  She had no clubbing, cyanosis, or edema.    Two-dimensional sector scan and Doppler fetal echocardiogram performed at 26w0d weeks gestation show segmental anatomy S,D,S with complete atrioventricular canal defect and a persistent left superior vena cava. There is no atrial dilation. The foramen ovale is patent. There is a moderate sized primum atrial septal defect. At least 2 pulmonary veins are seen returning normally to the left atrium. 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  The left SVC drains to a dilated coronary sinus.  The inferior vena cava returns normally to the right atrium. There is a common atrioventricular valve; with trivial " atrioventricular valve regurgitation. The valve is balanced over both ventricles.  There is a large inlet ventricular septal defect.  The left and right ventricular size and shortening are normal. No left ventricular outflow tract or right ventricular outflow tract obstruction. There is a left aortic arch. The aortic and ductal arches are patent. The fetal heart rate was within normal limits without ectopy or arrhythmia seen.  The spectral Doppler patterns were normal across all valves.  The spectral Doppler patterns of the ductal arch, aortic arch, aortic isthmus and umbilical artery were within normal limits.  There is a physiologic pericardial effusion.  Please see report for details.    In summary, Cass is a 40 y.o.  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 The defect appears balanced at the atrial and ventricular level. The diagnosis was discussed in detail with the parents including medical management in the post- period and surgical intervention, which generally occurs around 4-6 months of age.   In the meantime, we did not make any changes to her current delivery plan.  We did not prescribe any medications.  We did not recommend intervention.  As always, we recommend a heart healthy lifestyle.  I recommend follow up fetal echocardiogram in the early 3rd trimester. We recommend a cardiology consultation within 24 hours after birth.  Disposition will depend to either the NICU or  nursery will depend on this evaluation.  The findings and limitations of fetal echocardiography were explained.    LOC: 2  Today's fetal echocardiogram demonstrated complete common atrioventricular canal defect.  This is a cardiovascular anomaly or disease that is NOT expected to cause hemodynamic instability at birth, but has potential to cause hemodynamic instability.  Neonatology  management in DR.  Non emergent cardiology consultation.  For fetal echocardiogram findings of an unclear etiology, as long as the baby's clinical presentation allows, recommended a cardiology evaluation in conjunction with the neonatology and CTICU teams in [facility] to confirm fetal findings prior to ultimate disposition ([facility], CTICU or NICU*).       Scribe’s statement: I, Sofia Ulrich CNP, am scribing for, and in the presence of, Dr. Farias.    Thank you for allowing me to participate in Cass's care.  If you have any further questions, please do not hesitate to contact me.     Lex Farias M.D.  Fetal [facility] Center, Director  Ambulatory Pediatric Cardiology   Division of Pediatric Cardiology  [place]  [facility]   of Pediatrics, [facility] [employer]  [facility] [address]  [address]   [address]  Office:  [phone]  Fax:       [phone]  e-mail:  [email or web address]

## 2025-06-20 ENCOUNTER — DOCUMENTATION (OUTPATIENT)
Dept: OBSTETRICS AND GYNECOLOGY | Facility: HOSPITAL | Age: 40
End: 2025-06-20
Payer: COMMERCIAL

## 2025-06-20 PROBLEM — O35.BXX0 ABNORMAL FETAL ECHOCARDIOGRAM AFFECTING ANTEPARTUM CARE OF MOTHER (HHS-HCC): Status: ACTIVE | Noted: 2025-06-20

## 2025-07-01 ENCOUNTER — APPOINTMENT (OUTPATIENT)
Dept: OBSTETRICS AND GYNECOLOGY | Facility: CLINIC | Age: 40
End: 2025-07-01
Payer: COMMERCIAL

## 2025-07-01 VITALS — WEIGHT: 211 LBS | DIASTOLIC BLOOD PRESSURE: 82 MMHG | BODY MASS INDEX: 33.16 KG/M2 | SYSTOLIC BLOOD PRESSURE: 118 MMHG

## 2025-07-01 DIAGNOSIS — Z3A.27 27 WEEKS GESTATION OF PREGNANCY (HHS-HCC): ICD-10-CM

## 2025-07-01 DIAGNOSIS — Z34.82 ENCOUNTER FOR SUPERVISION OF NORMAL PREGNANCY IN MULTIGRAVIDA IN SECOND TRIMESTER: ICD-10-CM

## 2025-07-01 PROCEDURE — 0501F PRENATAL FLOW SHEET: CPT | Performed by: OBSTETRICS & GYNECOLOGY

## 2025-07-01 NOTE — PROGRESS NOTES
Baby active.    Sx's of reflux - pepcid bid as needed    S=28 weeks    Ob Visit  25     SUBJECTIVE      HPI: Cass Ngo is a 40 y.o.  at 27w5d here for RPNV.       OBJECTIVE  Visit Vitals  /82   Wt 95.7 kg (211 lb)   LMP 2024   BMI 33.16 kg/m²   OB Status Pregnant   Smoking Status Never   BSA 2.13 m²            ASSESSMENT & PLAN    Cass Ngo is a 40 y.o.  at 27w5d here for the following concerns we addressed today:    Problem List Items Addressed This Visit          Ob-Gyn Problems    27 weeks gestation of pregnancy ([facility])    Overview   Desired provider in labor: [] CNM  [] Physician   [x] Either Acceptable  [x] Blood Products: [x] Yes, accepts [] No, needs counseling  [x] Initial BMI: 33.10   [x] Prenatal Labs: 25  [x] Cervical Cancer Screening up to date  [x] Rh status: POSITIVE  [] Screen for IPV and Substance Use Risk:  [x] Genetic Screening (cfDNA):   3/21/25  [x] First Trimester Anatomy Screen (11-13.6 wks): 3/21/25, NT 2.02 (+ trisomy 21)  [x] Baby ASA (initiated):  [x] Pregnancy dated by:  LMP    [x] Anatomy US: (19-20 wks)  25  [] Federal Sterilization consent signed (if indicated):  [x] 1hr GCT at 24-28wks:  25  [x] Rhogam (if indicated): n/a  [] Fetal Surveillance (if indicated):  [] Tdap (27-32 wks, may be given up to 36 wks if initial window missed):   [x] RSV (32-36 wks) (Sept. to end ):  N/A    [] Feeding Intentions:  [] Postpartum Birth control method:   [] GBS at 36 - 37 wks:  [] 39 weeks discussion of IOL vs. Expectant management:  [x] Mode of delivery ( anticipated ): v - to deliver C[facility] d/t CHD         Relevant Orders    Glucose, 1 Hour Screen, Pregnancy    Syphilis Screen with Reflex    CBC     Other Visit Diagnoses         Encounter for supervision of normal pregnancy in multigravida in second trimester        Relevant Orders    Glucose, 1 Hour Screen, Pregnancy    Syphilis Screen with Reflex    CBC              RTC in 4  weeks  Seeing mfm in 1-2 weeks for growth usn and then echo later this month  Herber Dang MD

## 2025-07-04 LAB
ERYTHROCYTE [DISTWIDTH] IN BLOOD BY AUTOMATED COUNT: 13 % (ref 11–15)
GLUCOSE 1H P 50 G GLC PO SERPL-MCNC: 129 MG/DL
HCT VFR BLD AUTO: 35.6 % (ref 35–45)
HGB BLD-MCNC: 11.5 G/DL (ref 11.7–15.5)
MCH RBC QN AUTO: 29.2 PG (ref 27–33)
MCHC RBC AUTO-ENTMCNC: 32.3 G/DL (ref 32–36)
MCV RBC AUTO: 90.4 FL (ref 80–100)
PLATELET # BLD AUTO: 159 THOUSAND/UL (ref 140–400)
PMV BLD REES-ECKER: 11.1 FL (ref 7.5–12.5)
RBC # BLD AUTO: 3.94 MILLION/UL (ref 3.8–5.1)
T PALLIDUM AB SER QL IA: NEGATIVE
WBC # BLD AUTO: 9.1 THOUSAND/UL (ref 3.8–10.8)

## 2025-07-10 ENCOUNTER — HOSPITAL ENCOUNTER (OUTPATIENT)
Dept: RADIOLOGY | Facility: HOSPITAL | Age: 40
Discharge: HOME | End: 2025-07-10
Payer: COMMERCIAL

## 2025-07-10 DIAGNOSIS — Z3A.13 13 WEEKS GESTATION OF PREGNANCY (HHS-HCC): ICD-10-CM

## 2025-07-10 PROCEDURE — 76819 FETAL BIOPHYS PROFIL W/O NST: CPT | Performed by: MEDICAL GENETICS

## 2025-07-10 PROCEDURE — 76816 OB US FOLLOW-UP PER FETUS: CPT | Performed by: MEDICAL GENETICS

## 2025-07-10 PROCEDURE — 76819 FETAL BIOPHYS PROFIL W/O NST: CPT

## 2025-07-10 PROCEDURE — 76816 OB US FOLLOW-UP PER FETUS: CPT

## 2025-07-11 ENCOUNTER — DOCUMENTATION (OUTPATIENT)
Dept: OBSTETRICS AND GYNECOLOGY | Facility: HOSPITAL | Age: 40
End: 2025-07-11
Payer: COMMERCIAL

## 2025-07-29 ENCOUNTER — HOSPITAL ENCOUNTER (OUTPATIENT)
Dept: RADIOLOGY | Facility: CLINIC | Age: 40
Discharge: HOME | End: 2025-07-29
Payer: COMMERCIAL

## 2025-07-29 ENCOUNTER — APPOINTMENT (OUTPATIENT)
Dept: OBSTETRICS AND GYNECOLOGY | Facility: CLINIC | Age: 40
End: 2025-07-29
Payer: COMMERCIAL

## 2025-07-29 VITALS — BODY MASS INDEX: 32.68 KG/M2 | DIASTOLIC BLOOD PRESSURE: 80 MMHG | SYSTOLIC BLOOD PRESSURE: 140 MMHG | WEIGHT: 208 LBS

## 2025-07-29 DIAGNOSIS — Z34.83 MULTIGRAVIDA IN THIRD TRIMESTER (HHS-HCC): Primary | ICD-10-CM

## 2025-07-29 DIAGNOSIS — Z23 NEED FOR TDAP VACCINATION: ICD-10-CM

## 2025-07-29 DIAGNOSIS — Z3A.31 31 WEEKS GESTATION OF PREGNANCY (HHS-HCC): ICD-10-CM

## 2025-07-29 PROCEDURE — 90471 IMMUNIZATION ADMIN: CPT | Performed by: OBSTETRICS & GYNECOLOGY

## 2025-07-29 PROCEDURE — 0501F PRENATAL FLOW SHEET: CPT | Performed by: OBSTETRICS & GYNECOLOGY

## 2025-07-29 PROCEDURE — 90715 TDAP VACCINE 7 YRS/> IM: CPT | Performed by: OBSTETRICS & GYNECOLOGY

## 2025-07-29 NOTE — PROGRESS NOTES
TDAP    Given 25  IM Left Deltoid  Lot# [id number]  EXP:10/21/27  NDC: [id number]    Overall, patient feels well.  Denies headache, visual changes, abdominal pain.  First elevation in blood pressure seen during this pregnancy.  Systolic blood pressure 140.  Discussed evaluation in labor and delivery versus outpatient evaluation.  Check biophysical profile  Patient will begin checking home blood pressures.  Preeclamptic labs today.    Ob Visit  25     SUBJECTIVE      HPI: Cass Ngo is a 40 y.o.  at 31w5d here for RPNV.       OBJECTIVE  Visit Vitals  /80   Wt 94.3 kg (208 lb)   LMP 2024   BMI 32.68 kg/m²   OB Status Pregnant   Smoking Status Never   BSA 2.11 m²            ASSESSMENT & PLAN    Cass Ngo is a 40 y.o.  at 31w5d here for the following concerns we addressed today:    Problem List Items Addressed This Visit          Ob-Gyn Problems    31 weeks gestation of pregnancy ([facility])    Overview   Desired provider in labor: [] CNM  [] Physician   [x] Either Acceptable  [x] Blood Products: [x] Yes, accepts [] No, needs counseling  [x] Initial BMI: 33.10   [x] Prenatal Labs: 25  [x] Cervical Cancer Screening up to date  [x] Rh status: POSITIVE  [] Screen for IPV and Substance Use Risk:  [x] Genetic Screening (cfDNA):   3/21/25  [x] First Trimester Anatomy Screen (11-13.6 wks): 3/21/25, NT 2.02 (+ trisomy 21)  [x] Baby ASA (initiated):  [x] Pregnancy dated by:  LMP    [x] Anatomy US: (19-20 wks)  25  [] Federal Sterilization consent signed (if indicated):  [x] 1hr GCT at 24-28wks:  25  [x] Rhogam (if indicated): n/a  [] Fetal Surveillance (if indicated):  [x] Tdap (27-32 wks, may be given up to 36 wks if initial window missed): 25 31W5D  [x] RSV (32-36 wks) (Sept. to end ):  N/A    [] Feeding Intentions:  [] Postpartum Birth control method:   [] GBS at 36 - 37 wks:  [] 39 weeks discussion of IOL vs. Expectant management:  [x] Mode of delivery (  anticipated ): v - to deliver [facility] d/t CHD         Relevant Orders    Tdap vaccine, age 7 years and older    Protein, Urine Random    Lactate Dehydrogenase    Uric Acid    CBC    Comprehensive Metabolic Panel    US OB 14+ weeks anatomy scan     Other Visit Diagnoses         Multigravida in third trimester (HHS-HCC)    -  Primary    Relevant Orders    Tdap vaccine, age 7 years and older      Need for Tdap vaccination        Relevant Orders    Tdap vaccine, age 7 years and older              RTC in 1 weeks      Herber Dang MD

## 2025-07-30 LAB
ALBUMIN SERPL-MCNC: 3.7 G/DL (ref 3.6–5.1)
ALP SERPL-CCNC: 112 U/L (ref 31–125)
ALT SERPL-CCNC: 8 U/L (ref 6–29)
ANION GAP SERPL CALCULATED.4IONS-SCNC: 7 MMOL/L (CALC) (ref 7–17)
AST SERPL-CCNC: 13 U/L (ref 10–30)
BILIRUB SERPL-MCNC: 0.8 MG/DL (ref 0.2–1.2)
BUN SERPL-MCNC: 9 MG/DL (ref 7–25)
CALCIUM SERPL-MCNC: 9 MG/DL (ref 8.6–10.2)
CHLORIDE SERPL-SCNC: 103 MMOL/L (ref 98–110)
CO2 SERPL-SCNC: 24 MMOL/L (ref 20–32)
CREAT SERPL-MCNC: 0.69 MG/DL (ref 0.5–0.99)
CREAT UR-MCNC: 205 MG/DL (ref 20–275)
EGFRCR SERPLBLD CKD-EPI 2021: 112 ML/MIN/1.73M2
ERYTHROCYTE [DISTWIDTH] IN BLOOD BY AUTOMATED COUNT: 12.8 % (ref 11–15)
GLUCOSE SERPL-MCNC: 74 MG/DL (ref 65–99)
HCT VFR BLD AUTO: 37.6 % (ref 35–45)
HGB BLD-MCNC: 12.2 G/DL (ref 11.7–15.5)
LDH SERPL P TO L-CCNC: 178 U/L (ref 100–200)
MCH RBC QN AUTO: 28.6 PG (ref 27–33)
MCHC RBC AUTO-ENTMCNC: 32.4 G/DL (ref 32–36)
MCV RBC AUTO: 88.1 FL (ref 80–100)
PLATELET # BLD AUTO: 172 THOUSAND/UL (ref 140–400)
PMV BLD REES-ECKER: 11.8 FL (ref 7.5–12.5)
POTASSIUM SERPL-SCNC: 4.2 MMOL/L (ref 3.5–5.3)
PROT SERPL-MCNC: 6.4 G/DL (ref 6.1–8.1)
PROT UR-MCNC: 27 MG/DL (ref 5–24)
PROT/CREAT UR: 0.13 MG/MG CREAT (ref 0.02–0.18)
PROT/CREAT UR: 132 MG/G CREAT (ref 24–184)
RBC # BLD AUTO: 4.27 MILLION/UL (ref 3.8–5.1)
SODIUM SERPL-SCNC: 134 MMOL/L (ref 135–146)
URATE SERPL-MCNC: 4.7 MG/DL (ref 2.5–7)
WBC # BLD AUTO: 9.3 THOUSAND/UL (ref 3.8–10.8)

## 2025-07-31 ENCOUNTER — APPOINTMENT (OUTPATIENT)
Dept: PEDIATRIC CARDIOLOGY | Facility: CLINIC | Age: 40
End: 2025-07-31
Payer: COMMERCIAL

## 2025-08-05 ENCOUNTER — HOSPITAL ENCOUNTER (OUTPATIENT)
Dept: RADIOLOGY | Facility: HOSPITAL | Age: 40
Discharge: HOME | End: 2025-08-05
Payer: COMMERCIAL

## 2025-08-05 DIAGNOSIS — Z3A.13 13 WEEKS GESTATION OF PREGNANCY (HHS-HCC): ICD-10-CM

## 2025-08-05 DIAGNOSIS — R93.89 ABNORMAL FINDING ON ULTRASOUND: ICD-10-CM

## 2025-08-05 DIAGNOSIS — Z03.74 SUSPECTED PROBLEM WITH FETAL GROWTH NOT FOUND: ICD-10-CM

## 2025-08-05 DIAGNOSIS — O09.523 ADVANCED MATERNAL AGE IN MULTIGRAVIDA, THIRD TRIMESTER (HHS-HCC): ICD-10-CM

## 2025-08-05 PROCEDURE — 76819 FETAL BIOPHYS PROFIL W/O NST: CPT

## 2025-08-05 PROCEDURE — 76816 OB US FOLLOW-UP PER FETUS: CPT | Performed by: OBSTETRICS & GYNECOLOGY

## 2025-08-05 PROCEDURE — 76819 FETAL BIOPHYS PROFIL W/O NST: CPT | Performed by: OBSTETRICS & GYNECOLOGY

## 2025-08-05 PROCEDURE — 76816 OB US FOLLOW-UP PER FETUS: CPT

## 2025-08-08 ENCOUNTER — HOSPITAL ENCOUNTER (INPATIENT)
Facility: HOSPITAL | Age: 40
Discharge: STILL ADMITTED OR ADMINISTRATIVE | End: 2025-08-08
Attending: STUDENT IN AN ORGANIZED HEALTH CARE EDUCATION/TRAINING PROGRAM | Admitting: STUDENT IN AN ORGANIZED HEALTH CARE EDUCATION/TRAINING PROGRAM
Payer: COMMERCIAL

## 2025-08-08 ENCOUNTER — APPOINTMENT (OUTPATIENT)
Dept: PEDIATRIC CARDIOLOGY | Facility: HOSPITAL | Age: 40
End: 2025-08-08
Payer: COMMERCIAL

## 2025-08-08 VITALS
DIASTOLIC BLOOD PRESSURE: 115 MMHG | OXYGEN SATURATION: 97 % | BODY MASS INDEX: 33.48 KG/M2 | HEART RATE: 92 BPM | SYSTOLIC BLOOD PRESSURE: 181 MMHG | WEIGHT: 208.34 LBS | HEIGHT: 66 IN

## 2025-08-08 DIAGNOSIS — O14.13: ICD-10-CM

## 2025-08-08 DIAGNOSIS — O13.3 GESTATIONAL (PREGNANCY-INDUCED) HYPERTENSION WITHOUT SIGNIFICANT PROTEINURIA, THIRD TRIMESTER (HHS-HCC): ICD-10-CM

## 2025-08-08 DIAGNOSIS — O35.8XX0 MATERNAL CARE FOR OTHER (SUSPECTED) FETAL ABNORMALITY AND DAMAGE, NOT APPLICABLE OR UNSPECIFIED (HHS-HCC): ICD-10-CM

## 2025-08-08 DIAGNOSIS — O35.BXX0 ABNORMAL FETAL ECHOCARDIOGRAPHY AFFECTING ANTEPARTUM CARE OF MOTHER, SINGLE OR UNSPECIFIED FETUS (HHS-HCC): Primary | ICD-10-CM

## 2025-08-08 DIAGNOSIS — D62 ABLA (ACUTE BLOOD LOSS ANEMIA): ICD-10-CM

## 2025-08-08 DIAGNOSIS — O28.3 ABNORMAL FETAL ULTRASOUND: ICD-10-CM

## 2025-08-08 PROBLEM — Z3A.33 33 WEEKS GESTATION OF PREGNANCY (HHS-HCC): Status: ACTIVE | Noted: 2025-05-30

## 2025-08-08 PROBLEM — O14.10: Status: ACTIVE | Noted: 2025-08-08

## 2025-08-08 PROBLEM — O28.5 MATERNAL SERUM SCREEN POSITIVE FOR TRISOMY 21: Status: ACTIVE | Noted: 2025-08-08

## 2025-08-08 PROBLEM — O99.513 ASTHMA AFFECTING PREGNANCY IN THIRD TRIMESTER (HHS-HCC): Status: ACTIVE | Noted: 2024-04-29

## 2025-08-08 LAB
ABO GROUP (TYPE) IN BLOOD: NORMAL
ALBUMIN SERPL BCP-MCNC: 3.7 G/DL (ref 3.4–5)
ALBUMIN SERPL BCP-MCNC: 3.8 G/DL (ref 3.4–5)
ALP SERPL-CCNC: 130 U/L (ref 33–110)
ALP SERPL-CCNC: 132 U/L (ref 33–110)
ALT SERPL W P-5'-P-CCNC: 10 U/L (ref 7–45)
ALT SERPL W P-5'-P-CCNC: 10 U/L (ref 7–45)
ANION GAP SERPL CALC-SCNC: 12 MMOL/L (ref 10–20)
ANION GAP SERPL CALC-SCNC: 14 MMOL/L (ref 10–20)
ANTIBODY SCREEN: NORMAL
AST SERPL W P-5'-P-CCNC: 14 U/L (ref 9–39)
AST SERPL W P-5'-P-CCNC: 16 U/L (ref 9–39)
BILIRUB SERPL-MCNC: 0.9 MG/DL (ref 0–1.2)
BILIRUB SERPL-MCNC: 0.9 MG/DL (ref 0–1.2)
BLOOD EXPIRATION DATE: NORMAL
BUN SERPL-MCNC: 10 MG/DL (ref 6–23)
BUN SERPL-MCNC: 9 MG/DL (ref 6–23)
CALCIUM SERPL-MCNC: 8.5 MG/DL (ref 8.6–10.6)
CALCIUM SERPL-MCNC: 9 MG/DL (ref 8.6–10.6)
CHLORIDE SERPL-SCNC: 101 MMOL/L (ref 98–107)
CHLORIDE SERPL-SCNC: 103 MMOL/L (ref 98–107)
CO2 SERPL-SCNC: 20 MMOL/L (ref 21–32)
CO2 SERPL-SCNC: 23 MMOL/L (ref 21–32)
CREAT SERPL-MCNC: 0.68 MG/DL (ref 0.5–1.05)
CREAT SERPL-MCNC: 0.71 MG/DL (ref 0.5–1.05)
CREAT UR-MCNC: 41.4 MG/DL (ref 20–320)
DISPENSE STATUS: NORMAL
EGFRCR SERPLBLD CKD-EPI 2021: >90 ML/MIN/1.73M*2
EGFRCR SERPLBLD CKD-EPI 2021: >90 ML/MIN/1.73M*2
ERYTHROCYTE [DISTWIDTH] IN BLOOD BY AUTOMATED COUNT: 13.1 % (ref 11.5–14.5)
ERYTHROCYTE [DISTWIDTH] IN BLOOD BY AUTOMATED COUNT: 13.2 % (ref 11.5–14.5)
GLUCOSE SERPL-MCNC: 162 MG/DL (ref 74–99)
GLUCOSE SERPL-MCNC: 73 MG/DL (ref 74–99)
HCT VFR BLD AUTO: 36 % (ref 36–46)
HCT VFR BLD AUTO: 37.9 % (ref 36–46)
HGB BLD-MCNC: 12.2 G/DL (ref 12–16)
HGB BLD-MCNC: 12.2 G/DL (ref 12–16)
MCH RBC QN AUTO: 27.6 PG (ref 26–34)
MCH RBC QN AUTO: 28.3 PG (ref 26–34)
MCHC RBC AUTO-ENTMCNC: 32.2 G/DL (ref 32–36)
MCHC RBC AUTO-ENTMCNC: 33.9 G/DL (ref 32–36)
MCV RBC AUTO: 84 FL (ref 80–100)
MCV RBC AUTO: 86 FL (ref 80–100)
NRBC BLD-RTO: 0 /100 WBCS (ref 0–0)
NRBC BLD-RTO: 0 /100 WBCS (ref 0–0)
PLATELET # BLD AUTO: 169 X10*3/UL (ref 150–450)
PLATELET # BLD AUTO: 182 X10*3/UL (ref 150–450)
POTASSIUM SERPL-SCNC: 4.1 MMOL/L (ref 3.5–5.3)
POTASSIUM SERPL-SCNC: 4.3 MMOL/L (ref 3.5–5.3)
PRODUCT BLOOD TYPE: NORMAL
PRODUCT CODE: NORMAL
PROT SERPL-MCNC: 6.5 G/DL (ref 6.4–8.2)
PROT SERPL-MCNC: 6.6 G/DL (ref 6.4–8.2)
PROT UR-ACNC: 10 MG/DL (ref 5–24)
PROT/CREAT UR: 0.24 MG/MG CREAT (ref 0–0.17)
RBC # BLD AUTO: 4.31 X10*6/UL (ref 4–5.2)
RBC # BLD AUTO: 4.42 X10*6/UL (ref 4–5.2)
RH FACTOR (ANTIGEN D): NORMAL
SODIUM SERPL-SCNC: 131 MMOL/L (ref 136–145)
SODIUM SERPL-SCNC: 134 MMOL/L (ref 136–145)
TREPONEMA PALLIDUM IGG+IGM AB [PRESENCE] IN SERUM OR PLASMA BY IMMUNOASSAY: NONREACTIVE
UNIT ABO: NORMAL
UNIT NUMBER: NORMAL
UNIT RH: NORMAL
UNIT VOLUME: 350
WBC # BLD AUTO: 9 X10*3/UL (ref 4.4–11.3)
WBC # BLD AUTO: 9.7 X10*3/UL (ref 4.4–11.3)
XM INTEP: NORMAL

## 2025-08-08 PROCEDURE — 76825 ECHO EXAM OF FETAL HEART: CPT | Performed by: PEDIATRICS

## 2025-08-08 PROCEDURE — 96372 THER/PROPH/DIAG INJ SC/IM: CPT

## 2025-08-08 PROCEDURE — 86850 RBC ANTIBODY SCREEN: CPT

## 2025-08-08 PROCEDURE — 82570 ASSAY OF URINE CREATININE: CPT

## 2025-08-08 PROCEDURE — 99215 OFFICE O/P EST HI 40 MIN: CPT | Performed by: PEDIATRICS

## 2025-08-08 PROCEDURE — 3080F DIAST BP >= 90 MM HG: CPT | Performed by: PEDIATRICS

## 2025-08-08 PROCEDURE — 2500000004 HC RX 250 GENERAL PHARMACY W/ HCPCS (ALT 636 FOR OP/ED)

## 2025-08-08 PROCEDURE — 93325 DOPPLER ECHO COLOR FLOW MAPG: CPT | Performed by: PEDIATRICS

## 2025-08-08 PROCEDURE — 1210000001 HC SEMI-PRIVATE ROOM DAILY

## 2025-08-08 PROCEDURE — 86900 BLOOD TYPING SEROLOGIC ABO: CPT

## 2025-08-08 PROCEDURE — 85027 COMPLETE CBC AUTOMATED: CPT

## 2025-08-08 PROCEDURE — 99202 OFFICE O/P NEW SF 15 MIN: CPT

## 2025-08-08 PROCEDURE — 59025 FETAL NON-STRESS TEST: CPT | Mod: GC

## 2025-08-08 PROCEDURE — 2500000002 HC RX 250 W HCPCS SELF ADMINISTERED DRUGS (ALT 637 FOR MEDICARE OP, ALT 636 FOR OP/ED)

## 2025-08-08 PROCEDURE — 99215 OFFICE O/P EST HI 40 MIN: CPT

## 2025-08-08 PROCEDURE — 76827 ECHO EXAM OF FETAL HEART: CPT

## 2025-08-08 PROCEDURE — 80053 COMPREHEN METABOLIC PANEL: CPT

## 2025-08-08 PROCEDURE — 86923 COMPATIBILITY TEST ELECTRIC: CPT

## 2025-08-08 PROCEDURE — 59025 FETAL NON-STRESS TEST: CPT

## 2025-08-08 PROCEDURE — 86780 TREPONEMA PALLIDUM: CPT

## 2025-08-08 PROCEDURE — 86901 BLOOD TYPING SEROLOGIC RH(D): CPT

## 2025-08-08 PROCEDURE — 87081 CULTURE SCREEN ONLY: CPT

## 2025-08-08 PROCEDURE — 99199 UNLISTED SPECIAL SVC PX/RPRT: CPT

## 2025-08-08 PROCEDURE — 36415 COLL VENOUS BLD VENIPUNCTURE: CPT

## 2025-08-08 PROCEDURE — 99254 IP/OBS CNSLTJ NEW/EST MOD 60: CPT

## 2025-08-08 PROCEDURE — 84156 ASSAY OF PROTEIN URINE: CPT

## 2025-08-08 PROCEDURE — 3008F BODY MASS INDEX DOCD: CPT | Performed by: PEDIATRICS

## 2025-08-08 PROCEDURE — 76827 ECHO EXAM OF FETAL HEART: CPT | Performed by: PEDIATRICS

## 2025-08-08 PROCEDURE — 3077F SYST BP >= 140 MM HG: CPT | Performed by: PEDIATRICS

## 2025-08-08 RX ORDER — LABETALOL HYDROCHLORIDE 5 MG/ML
INJECTION, SOLUTION INTRAVENOUS
Status: COMPLETED
Start: 2025-08-08 | End: 2025-08-08

## 2025-08-08 RX ORDER — ADHESIVE BANDAGE
10 BANDAGE TOPICAL
Status: ACTIVE | OUTPATIENT
Start: 2025-08-08

## 2025-08-08 RX ORDER — ONDANSETRON HYDROCHLORIDE 2 MG/ML
4 INJECTION, SOLUTION INTRAVENOUS EVERY 6 HOURS PRN
Status: DISCONTINUED | OUTPATIENT
Start: 2025-08-08 | End: 2025-08-08

## 2025-08-08 RX ORDER — LABETALOL HYDROCHLORIDE 5 MG/ML
80 INJECTION, SOLUTION INTRAVENOUS ONCE
Status: COMPLETED | OUTPATIENT
Start: 2025-08-08 | End: 2025-08-08

## 2025-08-08 RX ORDER — HYDRALAZINE HYDROCHLORIDE 20 MG/ML
5 INJECTION INTRAMUSCULAR; INTRAVENOUS ONCE AS NEEDED
Status: DISCONTINUED | OUTPATIENT
Start: 2025-08-08 | End: 2025-08-08

## 2025-08-08 RX ORDER — TRANEXAMIC ACID 1 G/10ML
1000 INJECTION, SOLUTION INTRAVENOUS ONCE AS NEEDED
Status: DISCONTINUED | OUTPATIENT
Start: 2025-08-08 | End: 2025-08-08

## 2025-08-08 RX ORDER — LOPERAMIDE HYDROCHLORIDE 2 MG/1
4 CAPSULE ORAL EVERY 2 HOUR PRN
Status: DISCONTINUED | OUTPATIENT
Start: 2025-08-08 | End: 2025-08-08

## 2025-08-08 RX ORDER — CARBOPROST TROMETHAMINE 250 UG/ML
250 INJECTION, SOLUTION INTRAMUSCULAR ONCE AS NEEDED
Status: DISCONTINUED | OUTPATIENT
Start: 2025-08-08 | End: 2025-08-08

## 2025-08-08 RX ORDER — OXYTOCIN 10 [USP'U]/ML
10 INJECTION, SOLUTION INTRAMUSCULAR; INTRAVENOUS ONCE AS NEEDED
Status: DISCONTINUED | OUTPATIENT
Start: 2025-08-08 | End: 2025-08-08

## 2025-08-08 RX ORDER — LABETALOL HYDROCHLORIDE 5 MG/ML
20 INJECTION, SOLUTION INTRAVENOUS ONCE AS NEEDED
Status: COMPLETED | OUTPATIENT
Start: 2025-08-08 | End: 2025-08-08

## 2025-08-08 RX ORDER — METHYLERGONOVINE MALEATE 0.2 MG/ML
0.2 INJECTION INTRAVENOUS ONCE AS NEEDED
Status: DISCONTINUED | OUTPATIENT
Start: 2025-08-08 | End: 2025-08-08

## 2025-08-08 RX ORDER — BETAMETHASONE SODIUM PHOSPHATE AND BETAMETHASONE ACETATE 3; 3 MG/ML; MG/ML
12 INJECTION, SUSPENSION INTRA-ARTICULAR; INTRALESIONAL; INTRAMUSCULAR; SOFT TISSUE EVERY 24 HOURS
Status: DISCONTINUED | OUTPATIENT
Start: 2025-08-08 | End: 2025-08-08

## 2025-08-08 RX ORDER — ONDANSETRON HYDROCHLORIDE 2 MG/ML
4 INJECTION, SOLUTION INTRAVENOUS EVERY 6 HOURS PRN
Status: ACTIVE | OUTPATIENT
Start: 2025-08-08

## 2025-08-08 RX ORDER — HYDRALAZINE HYDROCHLORIDE 20 MG/ML
5 INJECTION INTRAMUSCULAR; INTRAVENOUS ONCE AS NEEDED
Status: ACTIVE | OUTPATIENT
Start: 2025-08-08

## 2025-08-08 RX ORDER — POLYETHYLENE GLYCOL 3350 17 G/17G
17 POWDER, FOR SOLUTION ORAL 2 TIMES DAILY PRN
Status: ACTIVE | OUTPATIENT
Start: 2025-08-08

## 2025-08-08 RX ORDER — LABETALOL HYDROCHLORIDE 5 MG/ML
20 INJECTION, SOLUTION INTRAVENOUS ONCE AS NEEDED
Status: COMPLETED | OUTPATIENT
Start: 2025-08-08 | End: 2025-08-09

## 2025-08-08 RX ORDER — LIDOCAINE HYDROCHLORIDE 10 MG/ML
20 INJECTION, SOLUTION INFILTRATION; PERINEURAL ONCE AS NEEDED
Status: DISCONTINUED | OUTPATIENT
Start: 2025-08-08 | End: 2025-08-08

## 2025-08-08 RX ORDER — OXYTOCIN/0.9 % SODIUM CHLORIDE 30/500 ML
60 PLASTIC BAG, INJECTION (ML) INTRAVENOUS ONCE AS NEEDED
Status: DISCONTINUED | OUTPATIENT
Start: 2025-08-08 | End: 2025-08-08

## 2025-08-08 RX ORDER — CALCIUM GLUCONATE 98 MG/ML
1 INJECTION, SOLUTION INTRAVENOUS ONCE AS NEEDED
Status: DISCONTINUED | OUTPATIENT
Start: 2025-08-08 | End: 2025-08-08

## 2025-08-08 RX ORDER — LIDOCAINE HYDROCHLORIDE 10 MG/ML
0.5 INJECTION, SOLUTION INFILTRATION; PERINEURAL ONCE AS NEEDED
Status: ACTIVE | OUTPATIENT
Start: 2025-08-08

## 2025-08-08 RX ORDER — TERBUTALINE SULFATE 1 MG/ML
0.25 INJECTION SUBCUTANEOUS ONCE AS NEEDED
Status: DISCONTINUED | OUTPATIENT
Start: 2025-08-08 | End: 2025-08-08

## 2025-08-08 RX ORDER — SODIUM CHLORIDE, SODIUM LACTATE, POTASSIUM CHLORIDE, CALCIUM CHLORIDE 600; 310; 30; 20 MG/100ML; MG/100ML; MG/100ML; MG/100ML
75 INJECTION, SOLUTION INTRAVENOUS CONTINUOUS
Status: DISCONTINUED | OUTPATIENT
Start: 2025-08-08 | End: 2025-08-08

## 2025-08-08 RX ORDER — MISOPROSTOL 200 UG/1
800 TABLET ORAL ONCE AS NEEDED
Status: DISCONTINUED | OUTPATIENT
Start: 2025-08-08 | End: 2025-08-08

## 2025-08-08 RX ORDER — ALBUTEROL SULFATE 90 UG/1
2 INHALANT RESPIRATORY (INHALATION) EVERY 6 HOURS PRN
Status: ACTIVE | OUTPATIENT
Start: 2025-08-08

## 2025-08-08 RX ORDER — LABETALOL HYDROCHLORIDE 5 MG/ML
20 INJECTION, SOLUTION INTRAVENOUS ONCE AS NEEDED
Status: DISCONTINUED | OUTPATIENT
Start: 2025-08-08 | End: 2025-08-08

## 2025-08-08 RX ORDER — CALCIUM CARBONATE 200(500)MG
1 TABLET,CHEWABLE ORAL EVERY 6 HOURS PRN
Status: DISPENSED | OUTPATIENT
Start: 2025-08-08

## 2025-08-08 RX ORDER — LIDOCAINE HYDROCHLORIDE 10 MG/ML
0.5 INJECTION, SOLUTION INFILTRATION; PERINEURAL ONCE AS NEEDED
Status: DISCONTINUED | OUTPATIENT
Start: 2025-08-08 | End: 2025-08-08

## 2025-08-08 RX ORDER — MAGNESIUM SULFATE HEPTAHYDRATE 40 MG/ML
2 INJECTION, SOLUTION INTRAVENOUS CONTINUOUS
Status: DISCONTINUED | OUTPATIENT
Start: 2025-08-08 | End: 2025-08-08

## 2025-08-08 RX ORDER — NIFEDIPINE 30 MG/1
30 TABLET, FILM COATED, EXTENDED RELEASE ORAL EVERY 24 HOURS
Status: DISCONTINUED | OUTPATIENT
Start: 2025-08-08 | End: 2025-08-09

## 2025-08-08 RX ORDER — ONDANSETRON 4 MG/1
4 TABLET, FILM COATED ORAL EVERY 6 HOURS PRN
Status: ACTIVE | OUTPATIENT
Start: 2025-08-08

## 2025-08-08 RX ORDER — LABETALOL HYDROCHLORIDE 5 MG/ML
40 INJECTION, SOLUTION INTRAVENOUS ONCE
Status: COMPLETED | OUTPATIENT
Start: 2025-08-08 | End: 2025-08-08

## 2025-08-08 RX ORDER — SIMETHICONE 80 MG
80 TABLET,CHEWABLE ORAL 4 TIMES DAILY PRN
Status: ACTIVE | OUTPATIENT
Start: 2025-08-08

## 2025-08-08 RX ORDER — ONDANSETRON 4 MG/1
4 TABLET, FILM COATED ORAL EVERY 6 HOURS PRN
Status: DISCONTINUED | OUTPATIENT
Start: 2025-08-08 | End: 2025-08-08

## 2025-08-08 RX ADMIN — NIFEDIPINE 30 MG: 30 TABLET, FILM COATED, EXTENDED RELEASE ORAL at 12:26

## 2025-08-08 RX ADMIN — LABETALOL HYDROCHLORIDE 40 MG: 5 INJECTION INTRAVENOUS at 12:08

## 2025-08-08 RX ADMIN — BETAMETHASONE SODIUM PHOSPHATE AND BETAMETHASONE ACETATE 12 MG: 3; 3 INJECTION, SUSPENSION INTRA-ARTICULAR; INTRALESIONAL; INTRAMUSCULAR at 13:07

## 2025-08-08 RX ADMIN — SODIUM CHLORIDE, SODIUM LACTATE, POTASSIUM CHLORIDE, AND CALCIUM CHLORIDE 75 ML/HR: .6; .31; .03; .02 INJECTION, SOLUTION INTRAVENOUS at 13:30

## 2025-08-08 RX ADMIN — LABETALOL HYDROCHLORIDE 80 MG: 5 INJECTION INTRAVENOUS at 12:25

## 2025-08-08 RX ADMIN — LABETALOL HYDROCHLORIDE 20 MG: 5 INJECTION INTRAVENOUS at 11:53

## 2025-08-08 RX ADMIN — LABETALOL HYDROCHLORIDE 40 MG: 5 INJECTION, SOLUTION INTRAVENOUS at 12:08

## 2025-08-08 RX ADMIN — LABETALOL HYDROCHLORIDE 80 MG: 5 INJECTION, SOLUTION INTRAVENOUS at 12:25

## 2025-08-08 SDOH — SOCIAL STABILITY: SOCIAL INSECURITY: ARE YOU OR HAVE YOU BEEN THREATENED OR ABUSED PHYSICALLY, EMOTIONALLY, OR SEXUALLY BY ANYONE?: NO

## 2025-08-08 SDOH — ECONOMIC STABILITY: FOOD INSECURITY: WITHIN THE PAST 12 MONTHS, THE FOOD YOU BOUGHT JUST DIDN'T LAST AND YOU DIDN'T HAVE MONEY TO GET MORE.: NEVER TRUE

## 2025-08-08 SDOH — HEALTH STABILITY: MENTAL HEALTH: WISH TO BE DEAD (PAST 1 MONTH): NO

## 2025-08-08 SDOH — SOCIAL STABILITY: SOCIAL INSECURITY: ABUSE SCREEN: ADULT

## 2025-08-08 SDOH — SOCIAL STABILITY: SOCIAL INSECURITY: HAS ANYONE EVER THREATENED TO HURT YOUR FAMILY OR YOUR PETS?: NO

## 2025-08-08 SDOH — ECONOMIC STABILITY: FOOD INSECURITY: WITHIN THE PAST 12 MONTHS, YOU WORRIED THAT YOUR FOOD WOULD RUN OUT BEFORE YOU GOT THE MONEY TO BUY MORE.: NEVER TRUE

## 2025-08-08 SDOH — ECONOMIC STABILITY: TRANSPORTATION INSECURITY: IN THE PAST 12 MONTHS, HAS LACK OF TRANSPORTATION KEPT YOU FROM MEDICAL APPOINTMENTS OR FROM GETTING MEDICATIONS?: NO

## 2025-08-08 SDOH — SOCIAL STABILITY: SOCIAL INSECURITY: WITHIN THE LAST YEAR, HAVE YOU BEEN AFRAID OF YOUR PARTNER OR EX-PARTNER?: NO

## 2025-08-08 SDOH — ECONOMIC STABILITY: FOOD INSECURITY: HOW HARD IS IT FOR YOU TO PAY FOR THE VERY BASICS LIKE FOOD, HOUSING, MEDICAL CARE, AND HEATING?: NOT HARD AT ALL

## 2025-08-08 SDOH — SOCIAL STABILITY: SOCIAL INSECURITY: PHYSICAL ABUSE: DENIES

## 2025-08-08 SDOH — HEALTH STABILITY: MENTAL HEALTH: NON-SPECIFIC ACTIVE SUICIDAL THOUGHTS (PAST 1 MONTH): NO

## 2025-08-08 SDOH — SOCIAL STABILITY: SOCIAL INSECURITY: WITHIN THE LAST YEAR, HAVE YOU BEEN HUMILIATED OR EMOTIONALLY ABUSED IN OTHER WAYS BY YOUR PARTNER OR EX-PARTNER?: NO

## 2025-08-08 SDOH — HEALTH STABILITY: MENTAL HEALTH: SUICIDAL BEHAVIOR (LIFETIME): NO

## 2025-08-08 SDOH — SOCIAL STABILITY: SOCIAL INSECURITY: DOES ANYONE TRY TO KEEP YOU FROM HAVING/CONTACTING OTHER FRIENDS OR DOING THINGS OUTSIDE YOUR HOME?: NO

## 2025-08-08 SDOH — SOCIAL STABILITY: SOCIAL INSECURITY
WITHIN THE LAST YEAR, HAVE YOU BEEN KICKED, HIT, SLAPPED, OR OTHERWISE PHYSICALLY HURT BY YOUR PARTNER OR EX-PARTNER?: NO

## 2025-08-08 SDOH — SOCIAL STABILITY: SOCIAL INSECURITY
WITHIN THE LAST YEAR, HAVE YOU BEEN RAPED OR FORCED TO HAVE ANY KIND OF SEXUAL ACTIVITY BY YOUR PARTNER OR EX-PARTNER?: NO

## 2025-08-08 SDOH — HEALTH STABILITY: MENTAL HEALTH: HAVE YOU USED ANY PRESCRIPTION DRUGS OTHER THAN PRESCRIBED IN THE PAST 12 MONTHS?: NO

## 2025-08-08 SDOH — ECONOMIC STABILITY: HOUSING INSECURITY: DO YOU FEEL UNSAFE GOING BACK TO THE PLACE WHERE YOU ARE LIVING?: NO

## 2025-08-08 SDOH — SOCIAL STABILITY: SOCIAL INSECURITY: HAVE YOU HAD THOUGHTS OF HARMING ANYONE ELSE?: NO

## 2025-08-08 SDOH — SOCIAL STABILITY: SOCIAL INSECURITY: VERBAL ABUSE: DENIES

## 2025-08-08 SDOH — HEALTH STABILITY: MENTAL HEALTH: WERE YOU ABLE TO COMPLETE ALL THE BEHAVIORAL HEALTH SCREENINGS?: YES

## 2025-08-08 SDOH — HEALTH STABILITY: MENTAL HEALTH: HAVE YOU USED ANY SUBSTANCES (CANABIS, COCAINE, HEROIN, HALLUCINOGENS, INHALANTS, ETC.) IN THE PAST 12 MONTHS?: NO

## 2025-08-08 SDOH — SOCIAL STABILITY: SOCIAL INSECURITY: DO YOU FEEL ANYONE HAS EXPLOITED OR TAKEN ADVANTAGE OF YOU FINANCIALLY OR OF YOUR PERSONAL PROPERTY?: NO

## 2025-08-08 SDOH — SOCIAL STABILITY: SOCIAL INSECURITY: ARE THERE ANY APPARENT SIGNS OF INJURIES/BEHAVIORS THAT COULD BE RELATED TO ABUSE/NEGLECT?: NO

## 2025-08-08 ASSESSMENT — PAIN SCALES - GENERAL

## 2025-08-08 ASSESSMENT — PAIN - FUNCTIONAL ASSESSMENT
PAIN_FUNCTIONAL_ASSESSMENT: 0-10

## 2025-08-08 ASSESSMENT — LIFESTYLE VARIABLES
HOW OFTEN DO YOU HAVE 6 OR MORE DRINKS ON ONE OCCASION: NEVER
AUDIT-C TOTAL SCORE: 0
AUDIT-C TOTAL SCORE: 0
HOW OFTEN DO YOU HAVE A DRINK CONTAINING ALCOHOL: NEVER
SKIP TO QUESTIONS 9-10: 1
HOW MANY STANDARD DRINKS CONTAINING ALCOHOL DO YOU HAVE ON A TYPICAL DAY: PATIENT DOES NOT DRINK

## 2025-08-08 ASSESSMENT — ACTIVITIES OF DAILY LIVING (ADL): LACK_OF_TRANSPORTATION: NO

## 2025-08-08 NOTE — PROGRESS NOTES
Cass Ngo was seen at the request of * No referring provider recorded for this case * for a chief complaint of AMA, increased risk of trisomy 21 on NIPT, and possible HLHS on 15 3/7 week obstetric scan; a report with my findings is being sent via written or electronic means the referring physician with my recommendations for treatment.     I had the pleasure of seeing Cass Ngo in Pediatric Cardiology consultation at our [facility] location as part of our prenatal heart program for for follow up of a fetal echocardiogram on 25 that showed an AV canal. She was initially referred for AMA, increased risk of trisomy 21 on NIPT, and possible HLHS on 15 3/7 week obstetric scan.  She is a 40 y.o. year-old  woman, currently 33w1d weeks gestation. Patient's last menstrual period was 2024. Estimated Date of Delivery: 25.  There have been no pregnancy complications.   She has not been hospitalized during this pregnancy.  She had a NIPT, which indicated an increased risk of trisomy 21 (98.29% PPV).  She has had a second trimester ultrasound that showed an AV canal and pleural effusion.      Prior to the visit, I personally reviewed the cardiac portions of the obstetrical ultrasound performed on 25.  There appears to be a complete common atrioventricular canal defect that is balanced at the atrial and ventricular level. There is no evidence of right or left ventricular outflow obstruction or significant valvular regurgitation.    Her previous obstetrical history is significant for 5 full term deliveries, 2 miscarriages, and 1 ectopic pregnancy.  Her past medical history is significant for asthma.  She has no history of congenital heart disease, arrhythmia, cardiomyopathy, hypercholesterolemia, hypertension, diabetes, rheumatic heart disease, cancer, lupus, Sjogren syndrome, clotting disorder, depression, anxiety, alcohol abuse, phenylketonuria, or DiGeorge.  She has had a cholecystectomy.  " She is not taking any medications.  She has no known allergies.  She is currently taking prenatal vitamins.      Her family history is negative for congenital heart disease, early atherosclerosis, sudden cardiac death, long QT syndrome, cardiomyopathy, aortic aneurysm, or genetic or metabolic disease.  EBONI states that his family history is negative for all the above as well.    She currently lives with her fiance and 5 children.  She works as an NP at [employer].  She does not smoke.  She denies illicit drug use or alcohol abuse.  She denies verbal, sexual, or physical abuse.     Delivery Hospital: [facility]  Father of the baby's name: Luis E    BP (!) 178/113 (BP Location: Right arm, Patient Position: Sitting)   Pulse 92   Ht 1.68 m (5' 6.14\")   Wt 94.5 kg (208 lb 5.4 oz)   LMP 12/19/2024   SpO2 97%   BMI 33.48 kg/m²     She was resting comfortably in the examination room and alert, active and in no respiratory distress. Skin was without rash.  HEENT: moist mucous membranes, no JVD, goiter. Breathing is not labored.  She was acyanotic.  There was no peripheral edema.   The abdomen was gravid, soft, nontender with normal bowel sounds.  The liver was not palpable.  The spleen tip was not palpable.  She had a normal gait and normal strength in all extremities.  Cranial nerves II - XII are intact.  She had no clubbing, cyanosis, or edema. Blood pressures were elevated today (178/113). She denies any headache, vision changes or swelling of extremities. It was recommended she be seen in labor and delivery triage.     Two-dimensional sector scan and Doppler fetal echocardiogram performed at 33w1d weeks gestation show segmental anatomy S,D,S with complete atrioventricular canal defect and a persistent left superior vena cava. There is no atrial dilation. The foramen ovale is patent. There is a moderate sized primum atrial septal defect. At least 2 pulmonary veins are seen returning normally to the left atrium. There are " bilateral superior vena cava.  The left SVC drains to a dilated coronary sinus.  The inferior vena cava returns normally to the right atrium. There is a common atrioventricular valve; with trivial atrioventricular valve regurgitation. The valve is balanced over both ventricles.  There is a large inlet ventricular septal defect.  The left and right ventricular size and shortening are normal. No left ventricular outflow tract or right ventricular outflow tract obstruction. There is a left aortic arch. The aortic and ductal arches are patent. The fetal heart rate was within normal limits without ectopy or arrhythmia seen.  The spectral Doppler patterns were normal across all valves.  The spectral Doppler patterns of the ductal arch, aortic arch, aortic isthmus and umbilical artery were within normal limits.  There is a physiologic pericardial effusion.  Please see report for details.    In summary, Cass is a 40 y.o.  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 The defect appears balanced at the atrial and ventricular level. The diagnosis was discussed in detail with the parents including medical management in the post- period and surgical intervention, which generally occurs around 4-6 months of age.   In the meantime, we did not make any changes to her current delivery plan.  We did not prescribe any medications.  We did not recommend intervention.  As always, we recommend a heart healthy lifestyle.  I recommend follow up fetal echocardiogram in the early 3rd trimester. We recommend a cardiology consultation within 24 hours after birth.  Disposition will depend to either the NICU or  nursery will depend on this evaluation.  The findings and limitations of fetal echocardiography were explained.    Her blood pressure was persistently elevated at check in and at the conclusion of  the study.  She is asymptomatic.  We recommended that she go to labor and delivery triage for monitoring and management.     LOC: 2  Today's fetal echocardiogram demonstrated complete common atrioventricular canal defect.  This is a cardiovascular anomaly or disease that is NOT expected to cause hemodynamic instability at birth, but has potential to cause hemodynamic instability.  Neonatology management in .  Non emergent cardiology consultation.  For fetal echocardiogram findings of an unclear etiology, as long as the baby's clinical presentation allows, recommended a cardiology evaluation in conjunction with the neonatology and CTICU teams in [facility] to confirm fetal findings prior to ultimate disposition ([facility], CTICU or NICU*).       Thank you for allowing me to participate in Cass's care.  If you have any further questions, please do not hesitate to contact me.     Lex Farias M.D.  [facility], Director  Ambulatory Pediatric Cardiology   Division of Pediatric Cardiology  [facility]  [facility]   of Pediatrics, [facility] [employer]  [facility] [address]  [place] [address]  Office:  [phone]  Fax:       [phone]  e-mail:  [email or web address]

## 2025-08-08 NOTE — H&P
OB Admission H&P    Assessment/Plan    Cass Ngo is a 40 y.o.  at 33w1d. SHALONDA: 2025, by Last Menstrual Period cw 8w US admitted for new diagnosis of PEC with SF.     Preeclampsia with Severe Features  -Admit to L&D, consented. Will transfer to [facility] 4 hours after last IV BP treatment.   -Diagnosed based on: severe range blood pressures requiring immediate treatment of IV labetalol 20/40/80 last given at 1225  -asx, however ordered magnesium in the s/o persistent high range SRBP with IV tx requiring 20/40/80 with highest 183/112  -HELLP labs and P:C pending  -nifed 30 started  -Discussed recommendation for admission until delivery at 34wga or sooner if needed for maternal or fetal indications  -Discussed risk of seizure, stroke and death as well as the warning signs to look out for including HA, CP, SOB, RUQ pain and vision changes which patient denies at this time  -Amenable for admission but  initially hesitant. Extensively discussed risks of discharge with PEC with SF especially in the s/o known fetal cardiac malformation including seizure, stroke and death as above as well as the risk of still birth.     Fetal Status  Trisomy 21  Fetal Cardiac Defect  -Trisomy 21 on cfDNA and fetal complete common AV canal defect balanced with a persistent LSVC. For NICU presence at delivery as well as cardiology consult within 24 hours of delivery and collection of additional green and purple tops at delivery.   -Discussed need for  admission to NICU in s/o delivery <35wga  -NST reactive. Spontaneous decel audible by RN in room at 1247 to 60's. Most likely 2/2 drop of BP from 164/90 > 126/79 s/p labetalol 80 compounded on known AV canal defect. Resolved in hands and knees position. Will CTM.   -Presentation vertex based on BSUS ultrasound  -PERICO 6.8 with 2x2 pocket identified   -EFW 1900g 19% AC 59% extrapolated from US   -BMZ: indicated, discussed with patient who is agreeable. For second dose in 24  hours if still pregnant.   -GBS collected. Prophylaxis is not indicated at this time, will start if moving to delivery and GBS screen is not resulted, discussed with patient.     Maternal Comorbidities  -Asthma, will avoid hemabate   -Grand multip T&C 1u    Postpartum  Contraception Plan: tubal if for CS, abstinence only as bridge for interval tubal    Patient seen and discussed with Dr. Indigo Mcmahon MD PGY-2  Obstetrics & Gynecology     Pregnancy Problems (from 02/18/25 to present)       Problem Noted Diagnosed Resolved    Pre-eclampsia, severe, antepartum, third trimester ([facility]) 8/8/2025 by Ann-Marie Mcmahon MD  No    Priority:  Medium       Pre-eclampsia, severe, antepartum ([facility]) 8/8/2025 by Ann-Marie Mcmahon MD  No    Priority:  Medium       31 weeks gestation of pregnancy ([facility]) 5/30/2025 by Heaven Lazar MA  No    Priority:  Medium       Overview Addendum 7/29/2025 10:26 AM by Angelita Cartagena MA   Desired provider in labor: [] CNM  [] Physician   [x] Either Acceptable  [x] Blood Products: [x] Yes, accepts [] No, needs counseling  [x] Initial BMI: 33.10   [x] Prenatal Labs: 2/18/25  [x] Cervical Cancer Screening up to date  [x] Rh status: POSITIVE  [] Screen for IPV and Substance Use Risk:  [x] Genetic Screening (cfDNA):   3/21/25  [x] First Trimester Anatomy Screen (11-13.6 wks): 3/21/25, NT 2.02 (+ trisomy 21)  [x] Baby ASA (initiated):  [x] Pregnancy dated by:  LMP    [x] Anatomy US: (19-20 wks)  5/9/25  [] Federal Sterilization consent signed (if indicated):  [x] 1hr GCT at 24-28wks:  7/21/25  [x] Rhogam (if indicated): n/a  [] Fetal Surveillance (if indicated):  [x] Tdap (27-32 wks, may be given up to 36 wks if initial window missed): 7/29/25 31W5D  [x] RSV (32-36 wks) (Sept. to end of Jan):  N/A    [] Feeding Intentions:  [] Postpartum Birth control method:   [] GBS at 36 - 37 wks:  [] 39 weeks discussion of IOL vs. Expectant management:  [x] Mode of delivery (  anticipated ): v - [place] [facility] [place]         Elevated blood pressure affecting pregnancy, antepartum 2025 by Anju Farmer MD  No    Priority:  Medium       Overview Signed 2025  2:40 AM by Anju Farmer MD   Multiple mild range BP and one severe range BP while admitted for ovarian torsion @ 21wga. Likely 2/2 pain         Antepartum multigravida of advanced maternal age ([facility]) 2025 by Herber Dagn MD  No    Priority:  Medium       Asthma affecting pregnancy in third trimester ([facility]) 2024 by Sepideh Fitzpatrick, SUZANNE  No    Priority:  Medium       13 weeks gestation of pregnancy ([facility]) 2025 by Angelita Cartagena MA  2025 by Herber Dang MD    Overview Signed 2025  3:25 PM by Angelita Cartagena MA   US  25  9w1d, edc 25  US  3/21/25    Desired provider in labor: [x] CNM  [x] Physician   [x] Either Acceptable  [x] Blood Products: [x] Yes, accepts [] No, needs counseling  [x] Initial BMI: 33.10   [x] Prenatal Labs:  25  [x] Cervical Cancer Screening up to date  [x] Rh status:  POSITIVE  [] Screen for IPV and Substance Use Risk:  [x] Genetic Screening (cfDNA):  3/21/25  [x] First Trimester Anatomy Screen (11-13.6 wks): 3/21/25, NT  [] Baby ASA (initiated):  [x] Pregnancy dated by:  LMP    [x] Anatomy US: (19-20 wks)  [] Federal Sterilization consent signed (if indicated):  [] 1hr GCT at 24-28wks:  [] Rhogam (if indicated): n/a  [x] Fetal Surveillance (if indicated):  [] Tdap (27-32 wks, may be given up to 36 wks if initial window missed):   [] RSV (32-36 wks) (Sept. to end ):     [] Feeding Intentions:  [] Postpartum Birth control method:   [] GBS at 36 - 37 wks:  [] 39 weeks discussion of IOL vs. Expectant management:  [x] Mode of delivery ( anticipated ): V                 Subjective   Cass GRANT Ngo is a 40 y.o.  at 33w1d. SHALONDA: 2025, by Last Menstrual Period cw 8w US admitted for new diagnosis of PEC with SF.     Pt  "is lying comfortably in bed. Reports that she is feeling fine today. Denies HA, CP, SOB, RUQ pain and vision changes. Had one MRBP for which she had HELLP labs drawn for in the outpatient setting but otherwise feels like these severely elevated BP's and her new diagnosis are very shocking to her. She is amenable for admission for the next 6 days but was hesitant and all questions were answered and extensively discussed risks of discharge with PEC with SF especially in the s/o known fetal cardiac malformation. Pt desiring BMZ and accepting of Mg gtt at this time. 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 Baby moving okay, no ctx, gush or constant leaking of fluid, or vaginal bleeding. Otherwise doing well, no other complaints at this time.      Pregnancy notables   -PEC with SF  -Asthma  -AMA, cfDNA with Trisomy 21   -Fetal complete common AV canal defect  -Laparoscopic ovarian de-torsion and cyst drainage @ 21wga  -Grand multiparity  -Lap ellie -tere  -Hx gHTN & FGR in previous pregnancies  -Prenatal pap ASCUS HPV neg     Prenatal Provider Alton    OB History    Para Term  AB Living   9 5 5 0 3 5   SAB IAB Ectopic Multiple Live Births   2 0 1 0 5      # Outcome Date GA Lbr Delvis/2nd Weight Sex Type Anes PTL Lv   9 Current            8 Term 23 38w1d  2.722 kg  Vag-Spont EPI N JULITA   7 Term 21 38w0d  3.09 kg M Vag-Spont EPI N JULITA      Name: \"Cuco\"   6 Term 16 40w2d  4.167 kg F Vag-Spont EPI N JULITA      Name: \"Khushbu\"   5 Term 10/18/12 38w4d  4.252 kg M Vag-Spont EPI N JULITA      Name: \"Omkar\"   4 Term 11 39w3d  4.111 kg M Vag-Spont EPI N JULITA      Name: \"Lex\"   3 Ectopic            2 SAB  10w0d          1 SAB  9w5d              Surgical History[1]    Social History     Tobacco Use    Smoking status: Never     Passive exposure: Never "    Smokeless tobacco: Never   Substance Use Topics    Alcohol use: Not Currently       Allergies[2]    Prescriptions Prior to Admission[3]  Objective     Last Vitals  Temp Pulse Resp BP MAP O2 Sat   36.1 °C (97 °F) 87 16 126/79 99 96 %     Blood Pressures         8/8/2025  1136 8/8/2025  1151 8/8/2025  1206 8/8/2025  1219 8/8/2025  1241    BP: 183/112 167/95 172/94 164/90 126/79             Physical Exam  General: NAD, mood appropriate  Cardiopulmonary: warm and well perfused, breathing comfortably on room air  Abdomen: Gravid, non-tender  Extremities: Symmetric  Speculum Exam: deferred  Cervix: deferred     Fetal Monitoring  Baseline: 130 bpm, Variability: moderate,  Accelerations: present and Decelerations: none  Uterine Activity: Irregular contractions  Interpretation: Reactive    Bedside ultrasound: Yes, vertex and PERICO 6.8 with identified 2x2cm pocket     Labs in chart were reviewed.   Results from last 7 days   Lab Units 08/08/25  1156   WBC AUTO x10*3/uL 9.0   HEMOGLOBIN g/dL 12.2   HEMATOCRIT % 36.0   PLATELETS AUTO x10*3/uL 169   AST U/L 16   ALT U/L 10   CREATININE mg/dL 0.68      Prenatal labs reviewed, remarkable for T21 on cfDNA. Benign cells on pathology from R ovarian cyst drainage.         [1]   Past Surgical History:  Procedure Laterality Date    OTHER SURGICAL HISTORY  10/18/2022    Dilation and curettage    OTHER SURGICAL HISTORY  10/18/2022    Cholecystectomy    OTHER SURGICAL HISTORY  10/18/2022    Tonsillectomy with adenoidectomy    OTHER SURGICAL HISTORY  10/18/2022    Uterine polypectomy   [2] No Known Allergies  [3]   Medications Prior to Admission   Medication Sig Dispense Refill Last Dose/Taking    acetaminophen (Tylenol) 500 mg tablet Take 2 tablets (1,000 mg) by mouth every 6 hours if needed for mild pain (1 - 3). 112 tablet 0 More than a month    albuterol 90 mcg/actuation inhaler Inhale.   More than a month    loratadine (Claritin) 10 mg tablet Take by mouth.   More than a month     prenatal no115/iron/folic acid (PRENATAL 19 ORAL) Take by mouth.   Past Week      15

## 2025-08-08 NOTE — LETTER
2025     Herber Dang MD  [address] [facility] [address]  [facility] [address]    Patient: Cass Ngo   YOB: 1985   Date of Visit: 2025       Dear Dr. Herber Dang MD:    Thank you for referring Cass Ngo to me for evaluation. Below are my notes for this consultation.  If you have questions, please do not hesitate to call me. I look forward to following your patient along with you.       Sincerely,     Lex Farias MD      CC: MD Shannna Holt, APRN-CN, [facility]  Sofia Ulrich, APRN-CNP  ______________________________________________________________________________________    Cass Ngo was seen at the request of * No referring provider recorded for this case * for a chief complaint of AMA, increased risk of trisomy 21 on NIPT, and possible HLHS on 15 3/7 week obstetric scan; a report with my findings is being sent via written or electronic means the referring physician with my recommendations for treatment.     I had the pleasure of seeing Cass Ngo in Pediatric Cardiology consultation at our [facility] location as part of our prenatal heart program for for follow up of a fetal echocardiogram on 25 that showed an AV canal. She was initially referred for AMA, increased risk of trisomy 21 on NIPT, and possible HLHS on 15 3/7 week obstetric scan.  She is a 40 y.o. year-old  woman, currently 33w1d weeks gestation. Patient's last menstrual period was 2024. Estimated Date of Delivery: 25.  There have been no pregnancy complications.   She has not been hospitalized during this pregnancy.  She had a NIPT, which indicated an increased risk of trisomy 21 (98.29% PPV).  She has had a second trimester ultrasound that showed an AV canal and pleural effusion.      Prior to the visit, I personally reviewed the cardiac portions of the obstetrical ultrasound performed on 25.  There appears to be a complete  "common atrioventricular canal defect that is balanced at the atrial and ventricular level. There is no evidence of right or left ventricular outflow obstruction or significant valvular regurgitation.    Her previous obstetrical history is significant for 5 full term deliveries, 2 miscarriages, and 1 ectopic pregnancy.  Her past medical history is significant for asthma.  She has no history of congenital heart disease, arrhythmia, cardiomyopathy, hypercholesterolemia, hypertension, diabetes, rheumatic heart disease, cancer, lupus, Sjogren syndrome, clotting disorder, depression, anxiety, alcohol abuse, phenylketonuria, or DiGeorge.  She has had a cholecystectomy.  She is not taking any medications.  She has no known allergies.  She is currently taking prenatal vitamins.      Her family history is negative for congenital heart disease, early atherosclerosis, sudden cardiac death, long QT syndrome, cardiomyopathy, aortic aneurysm, or genetic or metabolic disease.  FOB states that his family history is negative for all the above as well.    She currently lives with her fiance and 5 children.  She works as an NP at [employer].  She does not smoke.  She denies illicit drug use or alcohol abuse.  She denies verbal, sexual, or physical abuse.     Delivery [facility]: [facility]  Father of the baby's name: Luis E    BP (!) 178/113 (BP Location: Right arm, Patient Position: Sitting)   Pulse 92   Ht 1.68 m (5' 6.14\")   Wt 94.5 kg (208 lb 5.4 oz)   LMP 12/19/2024   SpO2 97%   BMI 33.48 kg/m²     She was resting comfortably in the examination room and alert, active and in no respiratory distress. Skin was without rash.  HEENT: moist mucous membranes, no JVD, goiter. Breathing is not labored.  She was acyanotic.  There was no peripheral edema.   The abdomen was gravid, soft, nontender with normal bowel sounds.  The liver was not palpable.  The spleen tip was not palpable.  She had a normal gait and normal strength in all extremities.  " Cranial nerves II - XII are intact.  She had no clubbing, cyanosis, or edema. Blood pressures were elevated today (178/113). She denies any headache, vision changes or swelling of extremities. It was recommended she be seen in labor and delivery triage.     Two-dimensional sector scan and Doppler fetal echocardiogram performed at 33w1d weeks gestation show segmental anatomy S,D,S with complete atrioventricular canal defect and a persistent left superior vena cava. There is no atrial dilation. The foramen ovale is patent. There is a moderate sized primum atrial septal defect. At least 2 pulmonary veins are seen returning normally to the left atrium. 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  The left SVC drains to a dilated coronary sinus.  The inferior vena cava returns normally to the right atrium. There is a common atrioventricular valve; with trivial atrioventricular valve regurgitation. The valve is balanced over both ventricles.  There is a large inlet ventricular septal defect.  The left and right ventricular size and shortening are normal. No left ventricular outflow tract or right ventricular outflow tract obstruction. There is a left aortic arch. The aortic and ductal arches are patent. The fetal heart rate was within normal limits without ectopy or arrhythmia seen.  The spectral Doppler patterns were normal across all valves.  The spectral Doppler patterns of the ductal arch, aortic arch, aortic isthmus and umbilical artery were within normal limits.  There is a physiologic pericardial effusion.  Please see report for details.    In summary, Cass is a 40 y.o.  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 The defect appears balanced at the atrial and ventricular level. The diagnosis was discussed in detail with the parents including medical management in the post- period and  surgical intervention, which generally occurs around 4-6 months of age.   In the meantime, we did not make any changes to her current delivery plan.  We did not prescribe any medications.  We did not recommend intervention.  As always, we recommend a heart healthy lifestyle.  I recommend follow up fetal echocardiogram in the early 3rd trimester. We recommend a cardiology consultation within 24 hours after birth.  Disposition will depend to either the NICU or  nursery will depend on this evaluation.  The findings and limitations of fetal echocardiography were explained.    Her blood pressure was persistently elevated at check in and at the conclusion of the study.  She is asymptomatic.  We recommended that she go to labor and delivery triage for monitoring and management.     LOC: 2  Today's fetal echocardiogram demonstrated complete common atrioventricular canal defect.  This is a cardiovascular anomaly or disease that is NOT expected to cause hemodynamic instability at birth, but has potential to cause hemodynamic instability.  Neonatology management in .  Non emergent cardiology consultation.  For fetal echocardiogram findings of an unclear etiology, as long as the baby's clinical presentation allows, recommended a cardiology evaluation in conjunction with the neonatology and CTICU teams in [facility] to confirm fetal findings prior to ultimate disposition ([facility], CTICU or NICU*).       Thank you for allowing me to participate in Cass's care.  If you have any further questions, please do not hesitate to contact me.     Lex Farias M.D.  [facility], Director  Ambulatory Pediatric Cardiology   Division of Pediatric Cardiology  [facility]  [facility]   of Pediatrics, [employer]  [facility] -   [address]  [place]  [address]  Office:  [phone]  Fax:       [phone]  e-mail:  [email or web address]

## 2025-08-08 NOTE — PATIENT INSTRUCTIONS
Your fetus likely has an atrioventricular canal defect (AV canal) with a persistent left superior vena cava (left shoulder vein takes a detour to get to the heart).     A complete atrioventricular canal defect is actually a combination of several closely associated heart problems that result in:    Atrial septal defect (ASD), a hole in the wall between the upper chambers of the heart  Ventricular septal defect (VSD), a hole in the wall between the pumping chambers of the heart  Abnormalities of the atrioventricular valves (usually mitral and tricuspid), the “doors” between the receiving chambers and the pumping chambers. There should be 2 valves, but in this case, there is only one valve    Blood can move freely among the four heart chambers, mixing oxygen-rich (red) blood with oxygen-poor (blue) blood.  AV canal occurs in two out of 10,000 births.  The condition is common in children with Down syndrome (about 40%).  Surgery in the first six months of life is often necessary to correct the defects.  After surgery, most children lead healthy lives but will need lifelong follow-up care.  A small proportion (~10%) will need additional surgery later in life    In most children, the cause isn't known. It's a very common type of heart defect in children with a chromosome problem, Trisomy 21 (Down syndrome). Some children can have other heart defects along with AV canal.    Normally, the left side of the heart only pumps blood to the body, and the heart's right side only pumps blood to the lungs. In a child with AV canal defect, blood can travel across the holes from the left heart chambers to the right heart chambers and out into the lung arteries. The extra blood being pumped into the lung arteries makes the heart and lungs work harder and the lungs can become congested.    A child with AV canal defect may breathe faster and harder than normal. Infants may have trouble feeding and growing at a normal rate.  Symptoms may not occur until several weeks after birth. High pressure may occur in the blood vessels in the lungs because more blood than normal is being pumped there. Over time this causes permanent damage to the lung blood vessels.    In some infants, the common valve between the upper and lower chambers doesn't close properly. This lets blood leak backward from the heart's lower chambers to the upper ones. This leak, called regurgitation or insufficiency, can make the heart work harder, too.    Finally, in some children with an AV canal defect and a left superior vena cava we see narrowing of the main body artery.  This is not seen today, but we won't know that the main body artery is completely normal until after the baby is born.      An AV canal can be fixed. Open-heart surgery is needed to repair the defect. Unlike some other types of septal defects, the AV canal defect can't close on its own. Medicines may be used temporarily to help with symptoms, but they don't cure the defect or prevent permanent damage to the lung arteries.     There is also a variation of normal in the way the veins from the upper left body return to the heart, a persistent left superior vena cava.  In most cases, the left upper veins drain across the upper chest and join a large vein to come back to the right atrium (right receiving chamber).  In this case, the veins drain behind the heart and join the coronary veins to go to the right atrium.  We do not need to do anything about this.     Sometimes it is not possible to see all the heart structures because of the position or size of the fetus. This does not mean they are not there, but may mean that for technical reasons they cannot be assessed. Sometimes this information may not be important; while in some cases it means that definite answers are not possible. The echocardiographer will discuss this with you if necessary, and repeat studies are frequently performed later in the  pregnancy.     Certain congenital heart abnormalities are also hard to detect by fetal echocardiograms, but often these are simple abnormalities. We do not mention this to concern you, but rather that you understand that there are technical limitations to such studies. It remains important that your pediatrician provides a normal careful medical examination of the baby (including the heart) takes place after birth, and if there were any suspicious cardiac findings, that these are evaluated in the usual way irrespective of the findings at fetal study.     Certain communications between the two sides of the circulation are normally present in all developing babies and normally close after birth. We are not able to tell in advance whether this will occur, however there is only a tiny chance that they will not. Persistence of these structures is generally not a difficult problem to deal with.     We direct our attention only to the heart, where we have special expertise. This is not the same as your general obstetric ultrasound scan. Other ultrasound information about the fetus can be obtained from an obstetric ultrasonographer or your obstetrician.

## 2025-08-08 NOTE — PROGRESS NOTES
Antepartum Progress Note    Assessment/Plan   Cass Ngo is a 40 y.o.  at 33w1d by LMP c/w 9wk US admitted for pre-eclampsia with severe features.      Preeclampsia with Severe Features  Diagnosed based on severe range blood pressures requiring IV treatment with IV labetalol 20/40/80, last given at 1225 on .  -Asymptomatic  -HELLP labs neg x1, P:C 0.24; repeat set pending  -for twice weekly labs and BPPs  -BP regimen: Nifed 30  -Currently normotensive, >4hrs since last srBP, will dc Mg  -Recommendation for admission until delivery at 34wga or sooner if needed for maternal or fetal indications     Fetal Status  Trisomy 21  Fetal Cardiac Defect  -Trisomy 21 on cfDNA and fetal complete common AV canal defect balanced with a persistent LSVC. For NICU presence at delivery as well as cardiology consult within 24 hours of delivery and collection of additional green and purple tops at delivery.  -NST reactive. Spontaneous decel audible by RN in room at 1247 (on 25) to 60's. Most likely 2/2 relative hypotension from 164/90 > 126/79 s/p labetalol 80 compounded on known AV canal defect. Resolved in hands and knees position.  -for daily NSTs while inpatient  -BSUS with vertex presentation, PERICO 6.8 with 2x2 pocket  -EFW (): 1862g 19%, AC 59%  -BMZ #1 given, for repeat dose in 24 hours     Maternal Co-morbidities  -Asthma, will avoid hemabate   -Grand multiparity, T&C 1u     Routine:  - GBS collected   - TDAP   - 1hr 129  - BCM: tubal if for CS, abstinence only as bridge for interval tubal or partner vasectomy     Dispo: Stable for transfer up to [facility] pending repeat labs. Continue inpatient admission until delivery at 34wga or sooner if needed.     Seen and discussed w/ Dr. Lacey Artis MD PGY-3  Obstetrics & Gynecology      Subjective   Denies headaches, vision changes, chest pain, shortness of breath, or RUQ pain.       Objective     Last Vitals:  Temp Pulse Resp BP MAP Pulse Ox   36.8 °C  (98.2 °F) 89 16 133/80 99 97 %     Vitals Min/Max Last 24 Hours:  Temp  Min: 35.8 °C (96.4 °F)  Max: 36.8 °C (98.2 °F)  Pulse  Min: 70  Max: 95  Resp  Min: 16  Max: 18  BP  Min: 121/70  Max: 183/112  MAP (mmHg)  Min: 85  Max: 142    Intake/Output:     Intake/Output Summary (Last 24 hours) at 8/8/2025 2000  Last data filed at 8/8/2025 1930  Gross per 24 hour   Intake 917.6 ml   Output 400 ml   Net 517.6 ml       Physical Exam:  GENERAL: Examination reveals a well developed, well nourished, gravid female in no acute distress. She is alert and cooperative.  HEENT: External ears normal. Nose normal, no erythema or discharge. Mouth and throat clear.  NECK: supple, no significant adenopathy  LUNGS: Normal respiratory effort  HEART: RRR  ABDOMEN: Gravid  EXTREMITIES: no limitation in range of motion  SKIN: normal coloration and turgor, no rashes  NEUROLOGICAL: alert, oriented, normal speech, no focal findings or movement disorder noted  PSYCHOLOGICAL: awake and alert; oriented to person, place, and time    Lab Data:  Lab Results   Component Value Date    WBC 9.0 08/08/2025    HGB 12.2 08/08/2025    HCT 36.0 08/08/2025     08/08/2025     Lab Results   Component Value Date    GLUCOSE 73 (L) 08/08/2025     (L) 08/08/2025    K 4.1 08/08/2025     08/08/2025    CO2 23 08/08/2025    ANIONGAP 12 08/08/2025    BUN 9 08/08/2025    CREATININE 0.68 08/08/2025    EGFR >90 08/08/2025    CALCIUM 9.0 08/08/2025    ALBUMIN 3.8 08/08/2025    PROT 6.6 08/08/2025    ALKPHOS 130 (H) 08/08/2025    ALT 10 08/08/2025    AST 16 08/08/2025    BILITOT 0.9 08/08/2025     0   Lab Value Date/Time    UTPCR 0.24 (H) 08/08/2025 1156    UTPCR 132 07/29/2025 1123    UTPCR 0.132 07/29/2025 1125

## 2025-08-09 LAB
ABO GROUP (TYPE) IN BLOOD: NORMAL
ANTIBODY SCREEN: NORMAL
RH FACTOR (ANTIGEN D): NORMAL

## 2025-08-09 PROCEDURE — 2500000002 HC RX 250 W HCPCS SELF ADMINISTERED DRUGS (ALT 637 FOR MEDICARE OP, ALT 636 FOR OP/ED)

## 2025-08-09 PROCEDURE — 59025 FETAL NON-STRESS TEST: CPT | Mod: GC

## 2025-08-09 PROCEDURE — 2500000001 HC RX 250 WO HCPCS SELF ADMINISTERED DRUGS (ALT 637 FOR MEDICARE OP)

## 2025-08-09 PROCEDURE — 2500000004 HC RX 250 GENERAL PHARMACY W/ HCPCS (ALT 636 FOR OP/ED)

## 2025-08-09 PROCEDURE — 99232 SBSQ HOSP IP/OBS MODERATE 35: CPT

## 2025-08-09 PROCEDURE — 1100000001 HC PRIVATE ROOM DAILY

## 2025-08-09 PROCEDURE — 59025 FETAL NON-STRESS TEST: CPT

## 2025-08-09 RX ORDER — NIFEDIPINE 60 MG/1
60 TABLET, FILM COATED, EXTENDED RELEASE ORAL
Status: DISCONTINUED | OUTPATIENT
Start: 2025-08-10 | End: 2025-08-10

## 2025-08-09 RX ORDER — ACETAMINOPHEN 325 MG/1
975 TABLET ORAL ONCE
Status: COMPLETED | OUTPATIENT
Start: 2025-08-09 | End: 2025-08-09

## 2025-08-09 RX ORDER — NIFEDIPINE 30 MG/1
30 TABLET, FILM COATED, EXTENDED RELEASE ORAL ONCE
Status: COMPLETED | OUTPATIENT
Start: 2025-08-09 | End: 2025-08-09

## 2025-08-09 RX ORDER — NIFEDIPINE 30 MG/1
30 TABLET, FILM COATED, EXTENDED RELEASE ORAL EVERY 24 HOURS
Status: DISCONTINUED | OUTPATIENT
Start: 2025-08-10 | End: 2025-08-10

## 2025-08-09 RX ORDER — BETAMETHASONE SODIUM PHOSPHATE AND BETAMETHASONE ACETATE 3; 3 MG/ML; MG/ML
12 INJECTION, SUSPENSION INTRA-ARTICULAR; INTRALESIONAL; INTRAMUSCULAR; SOFT TISSUE ONCE
Status: COMPLETED | OUTPATIENT
Start: 2025-08-09 | End: 2025-08-09

## 2025-08-09 RX ORDER — FAMOTIDINE 20 MG/1
20 TABLET, FILM COATED ORAL DAILY
Status: DISPENSED | OUTPATIENT
Start: 2025-08-09

## 2025-08-09 RX ADMIN — PRENATAL VIT W/ FE FUMARATE-FA TAB 27-0.8 MG 1 TABLET: 27-0.8 TAB at 09:13

## 2025-08-09 RX ADMIN — NIFEDIPINE 30 MG: 30 TABLET, FILM COATED, EXTENDED RELEASE ORAL at 13:13

## 2025-08-09 RX ADMIN — NIFEDIPINE 30 MG: 30 TABLET, FILM COATED, EXTENDED RELEASE ORAL at 14:03

## 2025-08-09 RX ADMIN — BETAMETHASONE SODIUM PHOSPHATE AND BETAMETHASONE ACETATE 12 MG: 3; 3 INJECTION, SUSPENSION INTRA-ARTICULAR; INTRALESIONAL; INTRAMUSCULAR at 14:03

## 2025-08-09 RX ADMIN — NIFEDIPINE 30 MG: 30 TABLET, FILM COATED, EXTENDED RELEASE ORAL at 17:20

## 2025-08-09 RX ADMIN — LABETALOL HYDROCHLORIDE 20 MG: 5 INJECTION INTRAVENOUS at 17:38

## 2025-08-09 RX ADMIN — ACETAMINOPHEN 975 MG: 325 TABLET ORAL at 19:28

## 2025-08-09 RX ADMIN — CALCIUM CARBONATE 1 TABLET: 500 TABLET, CHEWABLE ORAL at 13:13

## 2025-08-09 RX ADMIN — FAMOTIDINE 20 MG: 20 TABLET, FILM COATED ORAL at 14:03

## 2025-08-09 ASSESSMENT — PAIN - FUNCTIONAL ASSESSMENT
PAIN_FUNCTIONAL_ASSESSMENT: 0-10

## 2025-08-09 ASSESSMENT — PAIN SCALES - GENERAL
PAINLEVEL_OUTOF10: 0 - NO PAIN
PAINLEVEL_OUTOF10: 2
PAINLEVEL_OUTOF10: 0 - NO PAIN
PAINLEVEL_OUTOF10: 2
PAINLEVEL_OUTOF10: 0 - NO PAIN
PAINLEVEL_OUTOF10: 0 - NO PAIN
PAINLEVEL_OUTOF10: 4
PAINLEVEL_OUTOF10: 4

## 2025-08-09 ASSESSMENT — PAIN DESCRIPTION - LOCATION: LOCATION: BACK

## 2025-08-09 ASSESSMENT — PAIN SCALES - WONG BAKER: WONGBAKER_NUMERICALRESPONSE: NO HURT

## 2025-08-09 NOTE — PROGRESS NOTES
"ANTEPARTUM PROGRESS NOTE   2025, 8:11 AM     SUBJECTIVE:  No acute events overnight. Patient has no complaints this morning. Denies painful contractions, VB, LOF. Reports normal fetal movement.   Denies HA, vision changes, CP, SOB, RUQ or epigastric pain    OBJECTIVE:   BP (!) 148/88   Pulse 94   Temp 36.7 °C (98.1 °F) (Temporal)   Resp 18   Ht 1.676 m (5' 5.98\")   Wt 94.5 kg (208 lb 5.4 oz)   LMP 2024   SpO2 96%   BMI 33.64 kg/m²    Temp  Min: 35.8 °C (96.4 °F)  Max: 36.8 °C (98.2 °F)  Pulse  Min: 70  Max: 95  BP  Min: 121/70  Max: 183/112    General:  AAOx3, No acute distress  Cardiovascular: Warm and well perfused, RRR  Respiratory: Normal respiratory effort, CTAB  Abdominal:  Soft, gravid, non-tender, no rebound or guarding, no palpable contractions  Extremities: Warm, well perfused, no edema, no calf tenderness     NST: 135/mod/+accel/-decel  [place]: no contractions     Labs:   Results from last 7 days   Lab Units 25  2011 25  1156   WBC AUTO x10*3/uL 9.7 9.0   HEMOGLOBIN g/dL 12.2 12.2   HEMATOCRIT % 37.9 36.0   PLATELETS AUTO x10*3/uL 182 169   AST U/L 14 16   ALT U/L 10 10   CREATININE mg/dL 0.71 0.68         ASSESSMENT AND PLAN:     40 y.o.  at 33w2d by LMP c/w 9wk US admitted for pre-eclampsia with severe features.      Preeclampsia with Severe Features  Diagnosed based on severe range blood pressures requiring IV treatment with IV labetalol 20/40/80, last given at 1225 on .  -Asymptomatic  -HELLP labs neg x1, for repeat set in 6 hours, P:C 0.24  -for twice weekly labs and BPPs  -BP regimen: Nifed 30mg  -s/p Mag gtt on admission   -Discussed recommendation for admission until delivery at 34wga or sooner if needed for maternal or fetal indications     Fetal Status  Trisomy 21  Fetal Cardiac Defect  -Trisomy 21 on cfDNA and fetal complete common AV canal defect balanced with a persistent LSVC. For NICU presence at delivery as well as cardiology consult within 24 hours of " delivery and collection of additional green and purple tops at delivery.  -NST reactive this AM  -for daily NSTs while inpatient  -last US 8/5 EFW 1862g 19%, AC 59%. Cephalic 8/9  - BMZ 8/9- 8/10     Maternal Co-morbidities  -Asthma, albuterol prn  -Grand multiparity, T&C 1u     Routine:  - GBS pending 8/8  - TDAP 7/29  - 1hr 129  - BCM: tubal if for CS, abstinence only as bridge for interval tubal or partner vasectomy    Dispo: inpatient management until delivery at 34wga or sooner for fetal or maternal indications      Pt seen and discussed with [facility] Attending, Dr. Sutherland.     Lashay Albrecht MD   [facility]  Pager [phone]     Principal Problem:    Pre-eclampsia, severe, antepartum ([facility])  Active Problems:    Asthma affecting pregnancy in third trimester ([facility])    Antepartum multigravida of advanced maternal age ([facility])    33 weeks gestation of pregnancy ([facility])    Abnormal fetal echocardiogram affecting antepartum care of mother ([facility])    Pre-eclampsia, severe, antepartum, third trimester ([facility])    Maternal serum screen positive for trisomy 21

## 2025-08-09 NOTE — PROGRESS NOTES
"ANTEPARTUM PROGRESS NOTE   2025, 7:05 PM     SUBJECTIVE:  Denies headaches, vision changes, chest pain, shortness of breath, or RUQ pain. Reporting some back and hip pain. Denies contractions, leakage of fluid, or VB.    OBJECTIVE:   BP (!) 153/85 Comment: q 10 x 6  Pulse 93   Temp 36.8 °C (98.2 °F) (Oral)   Resp 18   Ht 1.676 m (5' 5.98\")   Wt 94.5 kg (208 lb 5.4 oz)   LMP 2024   SpO2 97%   BMI 33.64 kg/m²    Temp  Min: 36.2 °C (97.2 °F)  Max: 36.8 °C (98.2 °F)  Pulse  Min: 83  Max: 98  BP  Min: 128/74  Max: 165/92    General:  AAOx3, No acute distress  Cardiovascular: Warm and well perfused, RRR  Respiratory: Normal respiratory effort, CTAB  Abdominal:  Soft, gravid, non-tender, no rebound or guarding, no palpable contractions  Extremities: Warm, well perfused, no edema, no calf tenderness     NST: 135/mod/+accel/-decel  [place]: no contractions     Labs:   Results from last 7 days   Lab Units 25  2011 25  1156   WBC AUTO x10*3/uL 9.7 9.0   HEMOGLOBIN g/dL 12.2 12.2   HEMATOCRIT % 37.9 36.0   PLATELETS AUTO x10*3/uL 182 169   AST U/L 14 16   ALT U/L 10 10   CREATININE mg/dL 0.71 0.68         ASSESSMENT AND PLAN:     40 y.o.  at 33w2d by LMP c/w 9wk US admitted for pre-eclampsia with severe features.      Preeclampsia with Severe Features  Diagnosed based on severe range blood pressures requiring IV treatment with IV labetalol 20/40/80. Transferred to L&D for sustained severes, IV lab 20 last given at 1740 on .  -Asymptomatic  -HELLP labs neg x1, for repeat set in 6 hours, P:C 0.24  -for twice weekly labs and BPPs  -BP regimen: Nifed 30mg  -s/p Mag gtt on admission   -Cont monitoring on L&D until 4hrs since last srBP  -Discussed recommendation for admission until delivery at 34wga or sooner if needed for maternal or fetal indications     Fetal Status  Trisomy 21  Fetal Cardiac Defect  -Trisomy 21 on cfDNA and fetal complete common AV canal defect balanced with a persistent LSVC. " For NICU presence at delivery as well as cardiology consult within 24 hours of delivery and collection of additional green and purple tops at delivery.  -NST reactive this AM, cEFM while on L&D, currently cat I  -for daily NSTs while inpatient  -last US 8/5 EFW 1862g 19%, AC 59%. Cephalic 8/9  -BMZ 8/9- 8/10     Maternal Co-morbidities  -Asthma, albuterol prn  -Grand multiparity, T&C 1u     Routine:  - GBS pending 8/8  - TDAP 7/29  - 1hr 129  - BCM: tubal if for CS, abstinence only as bridge for interval tubal or partner vasectomy    Dispo: inpatient management until delivery at 34wga or sooner for fetal or maternal indications      Pt seen and discussed with Dr. Renny Artis MD PGY-3  Obstetrics & Gynecology      Principal Problem:    Pre-eclampsia, severe, antepartum ([facility])  Active Problems:    Asthma affecting pregnancy in third trimester ([facility])    Antepartum multigravida of advanced maternal age ([facility])    33 weeks gestation of pregnancy ([facility])    Abnormal fetal echocardiogram affecting antepartum care of mother ([facility])    Pre-eclampsia, severe, antepartum, third trimester ([facility])    Maternal serum screen positive for trisomy 21

## 2025-08-10 VITALS
OXYGEN SATURATION: 97 % | HEIGHT: 66 IN | HEART RATE: 98 BPM | BODY MASS INDEX: 33.48 KG/M2 | RESPIRATION RATE: 18 BRPM | WEIGHT: 208.34 LBS | SYSTOLIC BLOOD PRESSURE: 150 MMHG | TEMPERATURE: 98.2 F | DIASTOLIC BLOOD PRESSURE: 96 MMHG

## 2025-08-10 LAB — GP B STREP GENITAL QL CULT: NORMAL

## 2025-08-10 PROCEDURE — 2500000001 HC RX 250 WO HCPCS SELF ADMINISTERED DRUGS (ALT 637 FOR MEDICARE OP)

## 2025-08-10 PROCEDURE — 59025 FETAL NON-STRESS TEST: CPT | Mod: GC

## 2025-08-10 PROCEDURE — 1210000001 HC SEMI-PRIVATE ROOM DAILY

## 2025-08-10 PROCEDURE — 2500000002 HC RX 250 W HCPCS SELF ADMINISTERED DRUGS (ALT 637 FOR MEDICARE OP, ALT 636 FOR OP/ED)

## 2025-08-10 PROCEDURE — 59025 FETAL NON-STRESS TEST: CPT

## 2025-08-10 PROCEDURE — 99232 SBSQ HOSP IP/OBS MODERATE 35: CPT

## 2025-08-10 RX ORDER — NIFEDIPINE 30 MG/1
30 TABLET, FILM COATED, EXTENDED RELEASE ORAL ONCE
Status: COMPLETED | OUTPATIENT
Start: 2025-08-10 | End: 2025-08-10

## 2025-08-10 RX ORDER — NIFEDIPINE 60 MG/1
60 TABLET, FILM COATED, EXTENDED RELEASE ORAL EVERY 12 HOURS
Status: ACTIVE | OUTPATIENT
Start: 2025-08-11

## 2025-08-10 RX ADMIN — FAMOTIDINE 20 MG: 20 TABLET, FILM COATED ORAL at 08:48

## 2025-08-10 RX ADMIN — NIFEDIPINE 30 MG: 30 TABLET, FILM COATED, EXTENDED RELEASE ORAL at 18:32

## 2025-08-10 RX ADMIN — PRENATAL VIT W/ FE FUMARATE-FA TAB 27-0.8 MG 1 TABLET: 27-0.8 TAB at 08:48

## 2025-08-10 RX ADMIN — NIFEDIPINE 60 MG: 60 TABLET, FILM COATED, EXTENDED RELEASE ORAL at 06:53

## 2025-08-10 RX ADMIN — NIFEDIPINE 30 MG: 30 TABLET, FILM COATED, EXTENDED RELEASE ORAL at 16:24

## 2025-08-10 ASSESSMENT — PAIN SCALES - GENERAL
PAINLEVEL_OUTOF10: 0 - NO PAIN

## 2025-08-10 ASSESSMENT — PAIN - FUNCTIONAL ASSESSMENT
PAIN_FUNCTIONAL_ASSESSMENT: 0-10

## 2025-08-10 NOTE — PROGRESS NOTES
"ANTEPARTUM PROGRESS NOTE   8/10/2025, 4:58 AM     SUBJECTIVE:  on mac 2 last night for sustained severe range.  Denies headaches, vision changes, chest pain, shortness of breath, or RUQ pain. Denies contractions, leakage of fluid, or VB.    OBJECTIVE:   BP (!) 154/90   Pulse 99   Temp 36.8 °C (98.2 °F) (Temporal)   Resp 18   Ht 1.676 m (5' 5.98\")   Wt 94.5 kg (208 lb 5.4 oz)   LMP 2024   SpO2 96%   BMI 33.64 kg/m²    Temp  Min: 36.1 °C (97 °F)  Max: 36.8 °C (98.2 °F)  Pulse  Min: 80  Max: 110  BP  Min: 131/71  Max: 165/92    General:  AAOx3, No acute distress  Cardiovascular: Warm and well perfused, RRR  Respiratory: Normal respiratory effort, CTAB  Abdominal:  Soft, gravid, non-tender, no rebound or guarding, no palpable contractions  Extremities: Warm, well perfused, no edema, no calf tenderness     NST: 135/mod/+accel/-decel  [place]: no contractions     Labs:   Results from last 7 days   Lab Units 25  1156   WBC AUTO x10*3/uL 9.7 9.0   HEMOGLOBIN g/dL 12.2 12.2   HEMATOCRIT % 37.9 36.0   PLATELETS AUTO x10*3/uL 182 169   AST U/L 14 16   ALT U/L 10 10   CREATININE mg/dL 0.71 0.68         ASSESSMENT AND PLAN:     40 y.o.  at 33w3d by LMP c/w 9wk US admitted for pre-eclampsia with severe features.      Preeclampsia with Severe Features  Diagnosed based on severe range blood pressures requiring IV treatment with IV labetalol 20/40/80. Transferred to L&D for sustained severes, IV lab 20 last given at 1740 on .  -Asymptomatic  -HELLP labs neg x2, P:C 0.24  -for twice weekly labs and BPPs  -BP regimen: Nifed 60/30  -s/p Mag gtt on admission   -Discussed recommendation for admission until delivery at 34wga or sooner if needed for maternal or fetal indications     Fetal Status  Trisomy 21  Fetal Cardiac Defect  -Trisomy 21 on cfDNA and fetal complete common AV canal defect balanced with a persistent LSVC. For NICU presence at delivery as well as cardiology consult within 24 hours " of delivery and collection of additional green and purple tops at delivery.  -NST with 1 accel. Overall reassuring, but will perform BPP this Afternoon  -for daily NSTs while inpatient  -last US 8/5 EFW 1862g 19%, AC 59%. Cephalic 8/9  -BMZ 8/9- 8/10  - NICU aware - for formal consult on Monday     Maternal Co-morbidities  -Asthma, albuterol prn  -Grand multiparity, T&C 1u     Routine:  - GBS pending 8/8  - TDAP 7/29  - 1hr 129  - BCM: tubal if for CS, abstinence only as bridge for interval tubal or partner vasectomy    Dispo: inpatient management until delivery at 34wga or sooner for fetal or maternal indications      Pt seen and discussed with Dr. Koko Artis MD PGY-3  Obstetrics & Gynecology      Principal Problem:    Pre-eclampsia, severe, antepartum ([facility])  Active Problems:    Asthma affecting pregnancy in third trimester ([facility])    Antepartum multigravida of advanced maternal age ([facility])    33 weeks gestation of pregnancy ([facility])    Abnormal fetal echocardiogram affecting antepartum care of mother ([facility])    Pre-eclampsia, severe, antepartum, third trimester ([facility])    Maternal serum screen positive for trisomy 21

## 2025-08-10 NOTE — CARE PLAN
The patient's goals for the shift include stay pregnant    The clinical goals for the shift include pt will not have any worsening s/s of sPEC    Patient remained free from falls/injury throughout shift. VSS, no s/sx of worsening HDP. Patient resting comfortably in bed and declines needs at this time.

## 2025-08-11 ENCOUNTER — DOCUMENTATION (OUTPATIENT)
Dept: OBSTETRICS AND GYNECOLOGY | Facility: HOSPITAL | Age: 40
End: 2025-08-11
Payer: COMMERCIAL

## 2025-08-11 ENCOUNTER — ANESTHESIA (OUTPATIENT)
Dept: OBSTETRICS AND GYNECOLOGY | Facility: HOSPITAL | Age: 40
End: 2025-08-11
Payer: COMMERCIAL

## 2025-08-11 ENCOUNTER — ANESTHESIA EVENT (OUTPATIENT)
Dept: OBSTETRICS AND GYNECOLOGY | Facility: HOSPITAL | Age: 40
End: 2025-08-11
Payer: COMMERCIAL

## 2025-08-11 LAB
ABO GROUP (TYPE) IN BLOOD: NORMAL
ALBUMIN SERPL BCP-MCNC: 3.4 G/DL (ref 3.4–5)
ALP SERPL-CCNC: 113 U/L (ref 33–110)
ALT SERPL W P-5'-P-CCNC: 14 U/L (ref 7–45)
ANION GAP SERPL CALC-SCNC: 13 MMOL/L (ref 10–20)
ANTIBODY SCREEN: NORMAL
AST SERPL W P-5'-P-CCNC: 17 U/L (ref 9–39)
BILIRUB SERPL-MCNC: 0.5 MG/DL (ref 0–1.2)
BUN SERPL-MCNC: 10 MG/DL (ref 6–23)
CALCIUM SERPL-MCNC: 8.7 MG/DL (ref 8.6–10.6)
CHLORIDE SERPL-SCNC: 105 MMOL/L (ref 98–107)
CO2 SERPL-SCNC: 23 MMOL/L (ref 21–32)
CREAT SERPL-MCNC: 0.59 MG/DL (ref 0.5–1.05)
EGFRCR SERPLBLD CKD-EPI 2021: >90 ML/MIN/1.73M*2
ERYTHROCYTE [DISTWIDTH] IN BLOOD BY AUTOMATED COUNT: 13.2 % (ref 11.5–14.5)
GLUCOSE SERPL-MCNC: 90 MG/DL (ref 74–99)
HCT VFR BLD AUTO: 36.1 % (ref 36–46)
HGB BLD-MCNC: 11.9 G/DL (ref 12–16)
MCH RBC QN AUTO: 28.5 PG (ref 26–34)
MCHC RBC AUTO-ENTMCNC: 33 G/DL (ref 32–36)
MCV RBC AUTO: 86 FL (ref 80–100)
NRBC BLD-RTO: 0.2 /100 WBCS (ref 0–0)
PLATELET # BLD AUTO: 180 X10*3/UL (ref 150–450)
POTASSIUM SERPL-SCNC: 3.9 MMOL/L (ref 3.5–5.3)
PROT SERPL-MCNC: 5.7 G/DL (ref 6.4–8.2)
RBC # BLD AUTO: 4.18 X10*6/UL (ref 4–5.2)
RH FACTOR (ANTIGEN D): NORMAL
SODIUM SERPL-SCNC: 137 MMOL/L (ref 136–145)
WBC # BLD AUTO: 9.8 X10*3/UL (ref 4.4–11.3)

## 2025-08-11 PROCEDURE — 36415 COLL VENOUS BLD VENIPUNCTURE: CPT

## 2025-08-11 PROCEDURE — 86901 BLOOD TYPING SEROLOGIC RH(D): CPT

## 2025-08-11 PROCEDURE — 2500000004 HC RX 250 GENERAL PHARMACY W/ HCPCS (ALT 636 FOR OP/ED)

## 2025-08-11 PROCEDURE — 2500000001 HC RX 250 WO HCPCS SELF ADMINISTERED DRUGS (ALT 637 FOR MEDICARE OP)

## 2025-08-11 PROCEDURE — 85027 COMPLETE CBC AUTOMATED: CPT

## 2025-08-11 PROCEDURE — 2500000004 HC RX 250 GENERAL PHARMACY W/ HCPCS (ALT 636 FOR OP/ED): Performed by: STUDENT IN AN ORGANIZED HEALTH CARE EDUCATION/TRAINING PROGRAM

## 2025-08-11 PROCEDURE — 59050 FETAL MONITOR W/REPORT: CPT

## 2025-08-11 PROCEDURE — 3E033VJ INTRODUCTION OF OTHER HORMONE INTO PERIPHERAL VEIN, PERCUTANEOUS APPROACH: ICD-10-PCS

## 2025-08-11 PROCEDURE — 0U7C7DJ DILATION OF CERVIX WITH INTRALUM DEV, TEMP, VIA OPENING: ICD-10-PCS

## 2025-08-11 PROCEDURE — 99252 IP/OBS CONSLTJ NEW/EST SF 35: CPT | Performed by: STUDENT IN AN ORGANIZED HEALTH CARE EDUCATION/TRAINING PROGRAM

## 2025-08-11 PROCEDURE — 7210000002 HC LABOR PER HOUR

## 2025-08-11 PROCEDURE — 99199 UNLISTED SPECIAL SVC PX/RPRT: CPT

## 2025-08-11 PROCEDURE — 86923 COMPATIBILITY TEST ELECTRIC: CPT

## 2025-08-11 PROCEDURE — 80053 COMPREHEN METABOLIC PANEL: CPT

## 2025-08-11 PROCEDURE — 1100000001 HC PRIVATE ROOM DAILY

## 2025-08-11 PROCEDURE — 3700000014 EPIDURAL BLOCK: Performed by: STUDENT IN AN ORGANIZED HEALTH CARE EDUCATION/TRAINING PROGRAM

## 2025-08-11 PROCEDURE — 2500000002 HC RX 250 W HCPCS SELF ADMINISTERED DRUGS (ALT 637 FOR MEDICARE OP, ALT 636 FOR OP/ED)

## 2025-08-11 RX ORDER — MAGNESIUM SULFATE HEPTAHYDRATE 40 MG/ML
2 INJECTION, SOLUTION INTRAVENOUS CONTINUOUS
Status: DISCONTINUED | OUTPATIENT
Start: 2025-08-11 | End: 2025-08-15

## 2025-08-11 RX ORDER — OXYTOCIN/0.9 % SODIUM CHLORIDE 30/500 ML
2-30 PLASTIC BAG, INJECTION (ML) INTRAVENOUS CONTINUOUS
Status: DISCONTINUED | OUTPATIENT
Start: 2025-08-11 | End: 2025-08-12

## 2025-08-11 RX ORDER — ACETAMINOPHEN 325 MG/1
975 TABLET ORAL ONCE
Status: COMPLETED | OUTPATIENT
Start: 2025-08-11 | End: 2025-08-11

## 2025-08-11 RX ORDER — FENTANYL/ROPIVACAINE/NS/PF 2MCG/ML-.2
0-25 PLASTIC BAG, INJECTION (ML) INJECTION CONTINUOUS
Status: DISCONTINUED | OUTPATIENT
Start: 2025-08-11 | End: 2025-08-12

## 2025-08-11 RX ORDER — LABETALOL HYDROCHLORIDE 5 MG/ML
20 INJECTION, SOLUTION INTRAVENOUS ONCE
Status: COMPLETED | OUTPATIENT
Start: 2025-08-11 | End: 2025-08-11

## 2025-08-11 RX ORDER — LIDOCAINE HCL/EPINEPHRINE/PF 2%-1:200K
VIAL (ML) INJECTION AS NEEDED
Status: DISCONTINUED | OUTPATIENT
Start: 2025-08-11 | End: 2025-08-12

## 2025-08-11 RX ORDER — SODIUM CHLORIDE, SODIUM LACTATE, POTASSIUM CHLORIDE, CALCIUM CHLORIDE 600; 310; 30; 20 MG/100ML; MG/100ML; MG/100ML; MG/100ML
75 INJECTION, SOLUTION INTRAVENOUS CONTINUOUS
Status: ACTIVE | OUTPATIENT
Start: 2025-08-11 | End: 2025-08-12

## 2025-08-11 RX ORDER — LABETALOL 200 MG/1
200 TABLET, FILM COATED ORAL 2 TIMES DAILY
Status: DISCONTINUED | OUTPATIENT
Start: 2025-08-11 | End: 2025-08-14

## 2025-08-11 RX ORDER — CALCIUM GLUCONATE 98 MG/ML
1 INJECTION, SOLUTION INTRAVENOUS ONCE AS NEEDED
Status: DISCONTINUED | OUTPATIENT
Start: 2025-08-11 | End: 2025-08-12

## 2025-08-11 RX ADMIN — ACETAMINOPHEN 975 MG: 325 TABLET ORAL at 18:53

## 2025-08-11 RX ADMIN — MAGNESIUM SULFATE HEPTAHYDRATE 2 G/HR: 40 INJECTION, SOLUTION INTRAVENOUS at 10:52

## 2025-08-11 RX ADMIN — ACETAMINOPHEN 975 MG: 325 TABLET ORAL at 06:45

## 2025-08-11 RX ADMIN — FAMOTIDINE 20 MG: 20 TABLET, FILM COATED ORAL at 09:26

## 2025-08-11 RX ADMIN — Medication 5 ML: at 21:56

## 2025-08-11 RX ADMIN — LABETALOL HYDROCHLORIDE 200 MG: 200 TABLET, FILM COATED ORAL at 05:57

## 2025-08-11 RX ADMIN — LABETALOL HYDROCHLORIDE 20 MG: 5 INJECTION INTRAVENOUS at 05:21

## 2025-08-11 RX ADMIN — Medication 3 ML: at 22:16

## 2025-08-11 RX ADMIN — NIFEDIPINE 60 MG: 60 TABLET, FILM COATED, EXTENDED RELEASE ORAL at 18:49

## 2025-08-11 RX ADMIN — LIDOCAINE HYDROCHLORIDE,EPINEPHRINE BITARTRATE 3 ML: 20; .005 INJECTION, SOLUTION EPIDURAL; INFILTRATION; INTRACAUDAL; PERINEURAL at 21:52

## 2025-08-11 RX ADMIN — NIFEDIPINE 60 MG: 60 TABLET, FILM COATED, EXTENDED RELEASE ORAL at 06:45

## 2025-08-11 RX ADMIN — MAGNESIUM SULFATE HEPTAHYDRATE 2 G/HR: 40 INJECTION, SOLUTION INTRAVENOUS at 17:01

## 2025-08-11 RX ADMIN — Medication 10 ML/HR: at 22:00

## 2025-08-11 RX ADMIN — SODIUM CHLORIDE, SODIUM LACTATE, POTASSIUM CHLORIDE, AND CALCIUM CHLORIDE 75 ML/HR: .6; .31; .03; .02 INJECTION, SOLUTION INTRAVENOUS at 13:00

## 2025-08-11 RX ADMIN — Medication 2 MILLI-UNITS/MIN: at 12:46

## 2025-08-11 RX ADMIN — SODIUM CHLORIDE, SODIUM LACTATE, POTASSIUM CHLORIDE, AND CALCIUM CHLORIDE 75 ML/HR: .6; .31; .03; .02 INJECTION, SOLUTION INTRAVENOUS at 17:07

## 2025-08-11 RX ADMIN — PRENATAL VIT W/ FE FUMARATE-FA TAB 27-0.8 MG 1 TABLET: 27-0.8 TAB at 09:26

## 2025-08-11 RX ADMIN — Medication 5 ML: at 22:48

## 2025-08-11 RX ADMIN — LABETALOL HYDROCHLORIDE 200 MG: 200 TABLET, FILM COATED ORAL at 18:49

## 2025-08-11 SDOH — HEALTH STABILITY: MENTAL HEALTH: CURRENT SMOKER: 0

## 2025-08-11 ASSESSMENT — PAIN SCALES - GENERAL
PAINLEVEL_OUTOF10: 1
PAINLEVEL_OUTOF10: 0 - NO PAIN
PAINLEVEL_OUTOF10: 5 - MODERATE PAIN
PAINLEVEL_OUTOF10: 0 - NO PAIN
PAINLEVEL_OUTOF10: 0 - NO PAIN
PAINLEVEL_OUTOF10: 6
PAINLEVEL_OUTOF10: 0 - NO PAIN
PAINLEVEL_OUTOF10: 1
PAINLEVEL_OUTOF10: 1
PAINLEVEL_OUTOF10: 5 - MODERATE PAIN
PAINLEVEL_OUTOF10: 0 - NO PAIN
PAINLEVEL_OUTOF10: 0 - NO PAIN
PAINLEVEL_OUTOF10: 3
PAINLEVEL_OUTOF10: 3
PAINLEVEL_OUTOF10: 4
PAINLEVEL_OUTOF10: 0 - NO PAIN
PAINLEVEL_OUTOF10: 4
PAINLEVEL_OUTOF10: 0 - NO PAIN
PAINLEVEL_OUTOF10: 0 - NO PAIN
PAINLEVEL_OUTOF10: 4

## 2025-08-11 ASSESSMENT — PAIN - FUNCTIONAL ASSESSMENT
PAIN_FUNCTIONAL_ASSESSMENT: 0-10

## 2025-08-11 ASSESSMENT — PAIN DESCRIPTION - LOCATION: LOCATION: BACK

## 2025-08-11 ASSESSMENT — PAIN DESCRIPTION - DESCRIPTORS
DESCRIPTORS: HEADACHE
DESCRIPTORS: HEADACHE

## 2025-08-11 NOTE — SIGNIFICANT EVENT
Patient resting comfortably in bed. Amenable to CRB placement    Cervical Exam  Dilation: 1  Effacement (%): 50  Fetal Station: -3  Presentation: Vertex (Per Lisandro LEIGH)  Method: Manual  OB Examiner: Mikel LEIGH  Fetal Assessment  Movement: Present  Mode: External US  Doppler/Fetoscope Rate: 140 BPM  Baseline Fetal Heart Rate (bpm): 135 bpm  Baseline Classification: Normal  Variability: Minimal (Less than 5 BPM)  Pattern: Accelerations  Pattern Observations: pt off monitor to shower  Multiple Births: No      Contraction Frequency: none    A/P:     IOL  - Cervix unfavorable  - CRB placed, for pitocin  - Epidural/IV meds for pain management   - CEFM; Cat 1 currently  - GBS neg    Discussed with Dr. Indigo Morin MD, PGY-3

## 2025-08-11 NOTE — CONSULTS
"NICU PRENATAL CONSULT NOTE   Cass Ngo is a 40 y.o.  with SHALONDA 2025, by Last Menstrual Period presenting with pre-eclampsia with severe features, currently at 33w4d gestation.    Requesting Physician/Service:  Billy Sutherland MD/[facility]  Consulting Physician/Service: Kristina Kay DO and Rosey Butts MD/Neonatology    Reason for Consultation: Induction of labor at 33w4d for pre-eclampsia with severe features    Pregnancy Complications: Pregnancy complicated by pre-eclampsia with severe features, advanced maternal age, maternal serum screen positive for Trisomy 21, fetus with common AV canal defect balanced with persistent LSVC.     Prenatal labs:   Lab Results   Component Value Date    ABO O 2025    LABRH POS 2025    ABSCRN NEG 2025    RUBIG POSITIVE 10/18/2022     Toxicology:   No results found for: \"AMPHETAMINE\", \"MAMPHBLDS\", \"BARBITURATE\", \"BARBSCRNUR\", \"BENZODIAZ\", \"BENZO\", \"BUPRENBLDS\", \"CANNABBLDS\", \"CANNABINOID\", \"COCBLDS\", \"COCAI\", \"METHABLDS\", \"METH\", \"OXYBLDS\", \"OXYCODONE\", \"PCPBLDS\", \"PCP\", \"OPIATBLDS\", \"OPIATE\", \"FENTANYL\", \"DRBLDCOMM\"  Labs:  Lab Results   Component Value Date    GRPBSTREP No Group B Streptococcus (GBS) isolated 2025    HIV1X2 NON-REACTIVE 2025    HEPBSAG NON-REACTIVE 2025    HEPCAB NON-REACTIVE 2025    NEISSGONOAMP NOT DETECTED 2025    CHLAMTRACAMP NOT DETECTED 2025    SYPHT Nonreactive 2025     Fetal Imaging:  === Results for orders placed during the hospital encounter of 25 ===    Fetal Echo Complete [OLQ247] 2025    Status: Normal    Medical History  She has a past medical history of 14 weeks gestation of pregnancy ([facility]) (2022), Amenorrhea, secondary (2024), Asthma (), Chronic cough (2016), Encounter for supervision of other normal pregnancy, first trimester (2022), and Secondary amenorrhea (2022).     Family History  Family History[1]     Social " "History  She reports that she has never smoked. She has never been exposed to tobacco smoke. She has never used smokeless tobacco. She reports that she does not currently use alcohol. She reports that she does not use drugs.      Assessment/Recommendations:  Cass Ngo is a 40 y.o. at 33w4d who presented with severe pre-eclampsia, with pregnancy complicated by Trisomy 21 and common AV canal defect balanced with persistent LSVC and advanced maternal age. I met with Mom in her room. The patient has no history of  children.    Issues of prematurity discussed included:   1. Delivery room interventions including need to deliver in OR, need for a team for baby, and providing breathing assessment and support  2. Respiratory distress syndrome and the need for potential CPAP, intubation, or surfactant for underdeveloped lungs  3. Nutrition. I encouraged Cass Ngo to breastfeed which she is planning on doing. We discussed pumping immediately after delivery and 8x per day to provide milk during the hospital stay. Briefly discussed adverse events of NEC, need for donor breast milk if milk is not available. Mother does consent to use of donor human milk.  4. Length of stay - estimated at due date  5. We discussed jaundice and need for phototherapy  6. Apnea of prematurity and need for cessation of events prior to discharge  7. Immaturity of PO feeding and need for NG supplementation  8. Risk for infection and need for antibiotics  9. Possible procedures including central line placement    Mother appropriately concerned about her child \"Monserrat\" and listened attentively throughout the consultation. The biggest concerns were how her heart defect would contribute to issues of prematurity, which we further discussed. Time was given to ask questions.    Patient made aware that Neonatology will be present for delivery and that we remain available for any questions/concerns that might arise. Thank you.     Kristina" DO Rahul  NICU Fellow, PGY-5  08/11/25               [1] No family history on file.

## 2025-08-11 NOTE — PROGRESS NOTES
"ANTEPARTUM PROGRESS NOTE   2025, 7:27 AM     SUBJECTIVE:  on mac 2 this AM for sustained severe range Bps. S/p IV lab 20mg     OBJECTIVE:   /83   Pulse 86   Temp 36.6 °C (97.9 °F) (Temporal)   Resp 16   Ht 1.676 m (5' 5.98\")   Wt 94.5 kg (208 lb 5.4 oz)   LMP 2024   SpO2 96%   BMI 33.64 kg/m²    Temp  Min: 36.2 °C (97.2 °F)  Max: 36.9 °C (98.4 °F)  Pulse  Min: 85  Max: 110  BP  Min: 126/78  Max: 169/98    General:  AAOx3, No acute distress  Cardiovascular: Warm and well perfused, RRR  Respiratory: Normal respiratory effort, CTAB  Abdominal:  Soft, gravid, non-tender, no rebound or guarding, no palpable contractions  Extremities: Warm, well perfused, no edema, no calf tenderness     NST: 135/mod/-accel/-decel  [place]: no contractions     Labs:   Results from last 7 days   Lab Units 25  1156   WBC AUTO x10*3/uL 9.7 9.0   HEMOGLOBIN g/dL 12.2 12.2   HEMATOCRIT % 37.9 36.0   PLATELETS AUTO x10*3/uL 182 169   AST U/L 14 16   ALT U/L 10 10   CREATININE mg/dL 0.71 0.68         ASSESSMENT AND PLAN:     40 y.o.  at 33w3d by LMP c/w 9wk US admitted for pre-eclampsia with severe features.      Preeclampsia with Severe Features  Diagnosed based on severe range blood pressures requiring IV treatment  -last IV treatment this AM IV lab 20mg  -Asymptomatic  -HELLP labs neg x2, P:C 0.24; most recent wnl  -for twice weekly labs and BPPs  -BP regimen: Nifed 60/60 + labetalol 200 BID  -s/p Mag gtt on admission   -recommend proceeding with delivery given recent uptitration of medications     Fetal Status  Trisomy 21  Fetal Cardiac Defect  -Trisomy 21 on cfDNA and fetal complete common AV canal defect balanced with a persistent LSVC. For NICU presence at delivery as well as cardiology consult within 24 hours of delivery and collection of additional green and purple tops at delivery.  -CEFM while on mac 2 overall reassuring but no accels. For BPP today  -for daily NSTs while " inpatient  -last US 8/5 EFW 1862g 19%, AC 59%. Cephalic 8/9  -BMZ 8/9- 8/10  - NICU aware - for formal consult  today     Maternal Co-morbidities  -Asthma, albuterol prn  -Grand multiparity, T&C 1u     Routine:  - GBS neg 8/8  - TDAP 7/29  - 1hr 129  - BCM: tubal if for CS, abstinence only as bridge for interval tubal or partner vasectomy    Dispo: inpatient management until delivery at 34wga or sooner for fetal or maternal indications      Pt seen and discussed with Dr. Koko Albrecht MD  [facility]  [id number]      Principal Problem:    Pre-eclampsia, severe, antepartum ([facility])  Active Problems:    Asthma affecting pregnancy in third trimester ([facility])    Antepartum multigravida of advanced maternal age ([facility])    33 weeks gestation of pregnancy ([facility])    Abnormal fetal echocardiogram affecting antepartum care of mother ([facility])    Pre-eclampsia, severe, antepartum, third trimester ([facility])    Maternal serum screen positive for trisomy 21

## 2025-08-11 NOTE — SIGNIFICANT EVENT
"Significant Event Note    Subjective: Pt transferred down to Mac 2 for sustained SRBPs. Patient currently asymptomatic. Denies HA, vision changes, CP/SOB, RUQ pain, or new, unusual swelling     Objective:  Vitals: /82   Pulse 89   Temp 36.6 °C (97.9 °F) (Temporal)   Resp 18   Ht 1.676 m (5' 5.98\")   Wt 94.5 kg (208 lb 5.4 oz)   LMP 12/19/2024   SpO2 97%   BMI 33.64 kg/m²     General: no acute distress  Heart: warm and well perfused, RRR  Lungs: breathing comfortably on room air, CTAB  Neuro: awake and conversant  Psych: appropriate mood and affect     CEFM: Baseline- 135bpm, mod variability, - accels, - decel  [place]: no ctx     Assessment/Plan:    sPEC   -pt transferred to Mac 2 for  sustained SRBPs   -s/p IV labetalol 20  -HELLP labs neg x 2 on admission, P:C 0.24  -due for repeat labs this AM. Will draw   -asx  -on nifed 60/60. Will add labetalol 200mg BID to regimen at this time     Fetal Status   -IUP @ 33.4wga   -CEFM currently Cat I   -known Trisomy 21 & complete common AV canal defect  -s/p BMZ 8/8 - 8/9    Pt d/w Dr. Venkat Cope MD  OBGYN, PGY-2       "

## 2025-08-11 NOTE — PROGRESS NOTES
"ANTEPARTUM PROGRESS NOTE   2025, 7:46 AM     SUBJECTIVE: On mac 2 this AM for sustained severe range Bps. S/p IV lab 20mg. Denies HA, vision changes, CP, SOB, RUQ/epigastric pain. No contractions, VB, LOF, dFM.    OBJECTIVE:   BP (!) 140/85   Pulse 88   Temp 36.6 °C (97.9 °F) (Temporal)   Resp 16   Ht 1.676 m (5' 5.98\")   Wt 94.5 kg (208 lb 5.4 oz)   LMP 2024   SpO2 96%   BMI 33.64 kg/m²    Temp  Min: 36.2 °C (97.2 °F)  Max: 36.9 °C (98.4 °F)  Pulse  Min: 83  Max: 110  BP  Min: 126/78  Max: 169/98    General:  AAOx3, No acute distress  Cardiovascular: Warm and well perfused  Respiratory: Normal respiratory effort on RA  Abdominal:  Soft, gravid, non-tender, no rebound or guarding, no palpable contractions  Extremities: Warm, well perfused, no edema, no calf tenderness     NST: 135/periods of minimal variability in between periods of moderate variability/- accel/-decel  [place]: no contractions     Labs:   Results from last 7 days   Lab Units 25  0657 25  1156   WBC AUTO x10*3/uL 9.8 9.7 9.0   HEMOGLOBIN g/dL 11.9* 12.2 12.2   HEMATOCRIT % 36.1 37.9 36.0   PLATELETS AUTO x10*3/uL 180 182 169   AST U/L 17 14 16   ALT U/L 14 10 10   CREATININE mg/dL 0.59 0.71 0.68         ASSESSMENT AND PLAN:     40 y.o.  at 33w4d by LMP c/w 9wk US admitted for pre-eclampsia with severe features.      Preeclampsia with Severe Features  Diagnosed based on severe range blood pressures requiring IV treatment  -last IV treatment this AM IV lab 20mg  -Asymptomatic  -HELLP labs neg x2, P:C 0.24;  wnl this AM  -BP regimen: Nifed 60 BID + labetalol 200 BID added this AM  -s/p Mag gtt on admission   -Discussed with MFM, for IOL today given rapid uptitration of medications and non reactive FHT over last few days     Fetal Status  Trisomy 21  Fetal Cardiac Defect  -Trisomy 21 on cfDNA and fetal complete common AV canal defect balanced with a persistent LSVC. For NICU presence at delivery as well " as cardiology consult within 24 hours of delivery and collection of additional green and purple tops at delivery.  -CEFM while on mac 2 overall reassuring but no 15x15 accels. For cEFM during IOL  -for daily NSTs while inpatient  -last US 8/5 EFW 1862g 19%, AC 59%. Cephalic 8/9  -BMZ 8/9- 8/10  -NICU updated, will see patient      Maternal Co-morbidities  -Asthma, albuterol prn  -Grand multiparity, T&C 1u     Routine:  - GBS neg 8/8  - TDAP 7/29  - 1hr 129  - BCM: tubal if for CS, abstinence only as bridge for interval tubal or partner vasectomy    Dispo: for IOL today      Pt to be discussed with Dr. Indigo Morin MD PGY3      Principal Problem:    Pre-eclampsia, severe, antepartum ([facility])  Active Problems:    Asthma affecting pregnancy in third trimester ([facility])    Antepartum multigravida of advanced maternal age ([facility])    33 weeks gestation of pregnancy ([facility])    Abnormal fetal echocardiogram affecting antepartum care of mother ([facility])    Pre-eclampsia, severe, antepartum, third trimester ([facility])    Maternal serum screen positive for trisomy 21

## 2025-08-11 NOTE — PROGRESS NOTES
Intrapartum Progress Note    Assessment/Plan   Cass Ngo is a 40 y.o.  at 33w4d, by LMP c/w 9wk US, undergoing IOL in s/o sPEC requiring continued medication uptitration for BP control.    IOL   -crb in place, pit start @ 1245  -CEFM currently Cat I   -epidural as requested   -T&C x 1u for grand multiparity  -GBS neg     sPEC   -dx  by SRBP requiring IV tx, briefly admitted to the antepartum service. Decision for delivery made due to increasing blood pressures requiring continued rapid medication uptitration  -last IV tx lab 20 @  0500  -asx  -HELLP labs wnl x 2; P:C 0.24  -cont Nifed 60/60, labetalol 200mg BID  -Mg infusing     Fetal Status   Trisomy 21   Fetal Cardiac Defect   -IUP@ 33.4wga   -CEFM currently Cat I and reassuring   -For NICU presence at delivery as well as cardiology consult within 24 hours of delivery and collection of additional green and purple tops at delivery.   -last US  EFW 1862g 19%, AC 59%.   -BMZ - 8/10     Contraception Plan: tubal if CS, declines bridge to tubal vs possible partner vasectomy     Pt seen & d/w Dr. Stella Cope MD  OBGYN, PGY-2     Subjective   Pt doing well at this time. No concerns. Denies HA, vision changes, CP/SOB, RUQ pain, or worsening swelling.    Objective   Last Vitals:  Temp Pulse Resp BP MAP Pulse Ox   36.1 °C (97 °F) 105 16 (!) 140/94 113 98 %     Physical Examination:  General Appearance: no acute distress  Lungs: normal work of breathing, CTAB  Heart: warm, well perfused, RRR  Abdomen: gravid  Neurologic: no gross deficits, DTRs 2+ bilaterally  Psych exam: normal mood and affect    CEFM: Baseline- 130bpm, mod variability, - accels, -decel  [place]: ctx q2-3min     Lab Review:  Labs in chart were reviewed.

## 2025-08-12 ENCOUNTER — ANESTHESIA (OUTPATIENT)
Dept: OBSTETRICS AND GYNECOLOGY | Facility: HOSPITAL | Age: 40
End: 2025-08-12
Payer: COMMERCIAL

## 2025-08-12 ENCOUNTER — APPOINTMENT (OUTPATIENT)
Dept: RADIOLOGY | Facility: HOSPITAL | Age: 40
End: 2025-08-12
Payer: COMMERCIAL

## 2025-08-12 ENCOUNTER — PREP FOR PROCEDURE (OUTPATIENT)
Dept: OBSTETRICS AND GYNECOLOGY | Facility: HOSPITAL | Age: 40
End: 2025-08-12

## 2025-08-12 ENCOUNTER — APPOINTMENT (OUTPATIENT)
Dept: OBSTETRICS AND GYNECOLOGY | Facility: CLINIC | Age: 40
End: 2025-08-12
Payer: COMMERCIAL

## 2025-08-12 ENCOUNTER — ANESTHESIA EVENT (OUTPATIENT)
Dept: OBSTETRICS AND GYNECOLOGY | Facility: HOSPITAL | Age: 40
End: 2025-08-12
Payer: COMMERCIAL

## 2025-08-12 ENCOUNTER — CLINICAL DOCUMENTATION ONLY (OUTPATIENT)
Dept: OTHER | Facility: HOSPITAL | Age: 40
End: 2025-08-12

## 2025-08-12 LAB
APTT PPP: 26 SECONDS (ref 26–36)
APTT PPP: 27 SECONDS (ref 26–36)
BASE EXCESS BLDCOA CALC-SCNC: -5.4 MMOL/L (ref -10.8–-0.5)
BASE EXCESS BLDCOV CALC-SCNC: -6.4 MMOL/L (ref -8.1–-0.5)
BODY TEMPERATURE: 37 DEGREES CELSIUS
BODY TEMPERATURE: 37 DEGREES CELSIUS
ERYTHROCYTE [DISTWIDTH] IN BLOOD BY AUTOMATED COUNT: 12.9 % (ref 11.5–14.5)
ERYTHROCYTE [DISTWIDTH] IN BLOOD BY AUTOMATED COUNT: 13 % (ref 11.5–14.5)
FIBRINOGEN PPP-MCNC: 237 MG/DL (ref 200–400)
FIBRINOGEN PPP-MCNC: 275 MG/DL (ref 200–400)
HCO3 BLDCOA-SCNC: 24.6 MMOL/L (ref 15–29)
HCO3 BLDCOV-SCNC: 23.7 MMOL/L (ref 16–26)
HCT VFR BLD AUTO: 25.3 % (ref 36–46)
HCT VFR BLD AUTO: 31.6 % (ref 36–46)
HGB BLD-MCNC: 10.5 G/DL (ref 12–16)
HGB BLD-MCNC: 8.8 G/DL (ref 12–16)
INHALED O2 CONCENTRATION: 21 %
INHALED O2 CONCENTRATION: 21 %
INR PPP: 0.9 (ref 0.9–1.1)
INR PPP: 0.9 (ref 0.9–1.1)
MAGNESIUM SERPL-MCNC: 6.78 MG/DL (ref 1.6–2.4)
MCH RBC QN AUTO: 28 PG (ref 26–34)
MCH RBC QN AUTO: 29.1 PG (ref 26–34)
MCHC RBC AUTO-ENTMCNC: 33.2 G/DL (ref 32–36)
MCHC RBC AUTO-ENTMCNC: 34.8 G/DL (ref 32–36)
MCV RBC AUTO: 84 FL (ref 80–100)
MCV RBC AUTO: 84 FL (ref 80–100)
NRBC BLD-RTO: 0 /100 WBCS (ref 0–0)
NRBC BLD-RTO: 0 /100 WBCS (ref 0–0)
OXYHGB MFR BLDCOA: 31 % (ref 94–98)
OXYHGB MFR BLDCOV: 31.7 % (ref 94–98)
PCO2 BLDCOA: 69 MM HG (ref 31–75)
PCO2 BLDCOV: 68 MM HG (ref 22–53)
PH BLDCOA: 7.16 PH (ref 7.08–7.39)
PH BLDCOV: 7.15 PH (ref 7.19–7.47)
PLATELET # BLD AUTO: 182 X10*3/UL (ref 150–450)
PLATELET # BLD AUTO: 251 X10*3/UL (ref 150–450)
PO2 BLDCOA: 19 MM HG (ref 5–31)
PO2 BLDCOV: 22 MM HG (ref 13–37)
PROTHROMBIN TIME: 10.4 SECONDS (ref 9.8–12.4)
PROTHROMBIN TIME: 9.7 SECONDS (ref 9.8–12.4)
RBC # BLD AUTO: 3.02 X10*6/UL (ref 4–5.2)
RBC # BLD AUTO: 3.75 X10*6/UL (ref 4–5.2)
SAO2 % BLDCOA: 31 % (ref 3–69)
SAO2 % BLDCOV: 32 % (ref 16–84)
WBC # BLD AUTO: 15.7 X10*3/UL (ref 4.4–11.3)
WBC # BLD AUTO: 21 X10*3/UL (ref 4.4–11.3)

## 2025-08-12 PROCEDURE — 2500000004 HC RX 250 GENERAL PHARMACY W/ HCPCS (ALT 636 FOR OP/ED): Performed by: NURSE ANESTHETIST, CERTIFIED REGISTERED

## 2025-08-12 PROCEDURE — 2720000007 HC OR 272 NO HCPCS: Performed by: OBSTETRICS & GYNECOLOGY

## 2025-08-12 PROCEDURE — 71045 X-RAY EXAM CHEST 1 VIEW: CPT | Performed by: RADIOLOGY

## 2025-08-12 PROCEDURE — 59514 CESAREAN DELIVERY ONLY: CPT | Performed by: OBSTETRICS & GYNECOLOGY

## 2025-08-12 PROCEDURE — 10H07YZ INSERTION OF OTHER DEVICE INTO PRODUCTS OF CONCEPTION, VIA NATURAL OR ARTIFICIAL OPENING: ICD-10-PCS

## 2025-08-12 PROCEDURE — 10D17ZZ EXTRACTION OF PRODUCTS OF CONCEPTION, RETAINED, VIA NATURAL OR ARTIFICIAL OPENING: ICD-10-PCS

## 2025-08-12 PROCEDURE — 2500000001 HC RX 250 WO HCPCS SELF ADMINISTERED DRUGS (ALT 637 FOR MEDICARE OP): Performed by: NURSE ANESTHETIST, CERTIFIED REGISTERED

## 2025-08-12 PROCEDURE — 7210000002 HC LABOR PER HOUR

## 2025-08-12 PROCEDURE — 99199 UNLISTED SPECIAL SVC PX/RPRT: CPT

## 2025-08-12 PROCEDURE — 2500000005 HC RX 250 GENERAL PHARMACY W/O HCPCS: Performed by: ANESTHESIOLOGIST ASSISTANT

## 2025-08-12 PROCEDURE — 2500000004 HC RX 250 GENERAL PHARMACY W/ HCPCS (ALT 636 FOR OP/ED)

## 2025-08-12 PROCEDURE — 82810 BLOOD GASES O2 SAT ONLY: CPT | Performed by: OBSTETRICS & GYNECOLOGY

## 2025-08-12 PROCEDURE — 2500000005 HC RX 250 GENERAL PHARMACY W/O HCPCS

## 2025-08-12 PROCEDURE — 7100000016 HC LABOR RECOVERY PER HOUR: Performed by: STUDENT IN AN ORGANIZED HEALTH CARE EDUCATION/TRAINING PROGRAM

## 2025-08-12 PROCEDURE — 10907ZC DRAINAGE OF AMNIOTIC FLUID, THERAPEUTIC FROM PRODUCTS OF CONCEPTION, VIA NATURAL OR ARTIFICIAL OPENING: ICD-10-PCS

## 2025-08-12 PROCEDURE — 82805 BLOOD GASES W/O2 SATURATION: CPT | Performed by: OBSTETRICS & GYNECOLOGY

## 2025-08-12 PROCEDURE — 85610 PROTHROMBIN TIME: CPT

## 2025-08-12 PROCEDURE — 85384 FIBRINOGEN ACTIVITY: CPT

## 2025-08-12 PROCEDURE — 83735 ASSAY OF MAGNESIUM: CPT

## 2025-08-12 PROCEDURE — 85027 COMPLETE CBC AUTOMATED: CPT

## 2025-08-12 PROCEDURE — 2500000002 HC RX 250 W HCPCS SELF ADMINISTERED DRUGS (ALT 637 FOR MEDICARE OP, ALT 636 FOR OP/ED)

## 2025-08-12 PROCEDURE — 71045 X-RAY EXAM CHEST 1 VIEW: CPT

## 2025-08-12 PROCEDURE — 58611 LIGATE OVIDUCT(S) ADD-ON: CPT

## 2025-08-12 PROCEDURE — 1210000001 HC SEMI-PRIVATE ROOM DAILY

## 2025-08-12 PROCEDURE — 7100000016 HC LABOR RECOVERY PER HOUR: Performed by: OBSTETRICS & GYNECOLOGY

## 2025-08-12 PROCEDURE — 3700000014 HC AN EPIDURAL BLOCK CHARGE: Performed by: STUDENT IN AN ORGANIZED HEALTH CARE EDUCATION/TRAINING PROGRAM

## 2025-08-12 PROCEDURE — 2500000004 HC RX 250 GENERAL PHARMACY W/ HCPCS (ALT 636 FOR OP/ED): Performed by: ANESTHESIOLOGIST ASSISTANT

## 2025-08-12 PROCEDURE — 59160 D & C AFTER DELIVERY: CPT | Performed by: STUDENT IN AN ORGANIZED HEALTH CARE EDUCATION/TRAINING PROGRAM

## 2025-08-12 PROCEDURE — 85730 THROMBOPLASTIN TIME PARTIAL: CPT

## 2025-08-12 PROCEDURE — P9045 ALBUMIN (HUMAN), 5%, 250 ML: HCPCS | Mod: JZ | Performed by: ANESTHESIOLOGIST ASSISTANT

## 2025-08-12 PROCEDURE — 59515 CESAREAN DELIVERY: CPT

## 2025-08-12 PROCEDURE — 2500000004 HC RX 250 GENERAL PHARMACY W/ HCPCS (ALT 636 FOR OP/ED): Mod: JZ

## 2025-08-12 PROCEDURE — 2500000001 HC RX 250 WO HCPCS SELF ADMINISTERED DRUGS (ALT 637 FOR MEDICARE OP)

## 2025-08-12 PROCEDURE — 59812 TREATMENT OF MISCARRIAGE: CPT | Performed by: STUDENT IN AN ORGANIZED HEALTH CARE EDUCATION/TRAINING PROGRAM

## 2025-08-12 PROCEDURE — 3E0H7GC INTRODUCTION OF OTHER THERAPEUTIC SUBSTANCE INTO LOWER GI, VIA NATURAL OR ARTIFICIAL OPENING: ICD-10-PCS

## 2025-08-12 PROCEDURE — 0UB70ZZ EXCISION OF BILATERAL FALLOPIAN TUBES, OPEN APPROACH: ICD-10-PCS | Performed by: OBSTETRICS & GYNECOLOGY

## 2025-08-12 PROCEDURE — 2500000004 HC RX 250 GENERAL PHARMACY W/ HCPCS (ALT 636 FOR OP/ED): Performed by: STUDENT IN AN ORGANIZED HEALTH CARE EDUCATION/TRAINING PROGRAM

## 2025-08-12 PROCEDURE — 7200000001 HC TUBAL LIGATION: Performed by: OBSTETRICS & GYNECOLOGY

## 2025-08-12 PROCEDURE — 3700000014 HC AN EPIDURAL BLOCK CHARGE: Performed by: OBSTETRICS & GYNECOLOGY

## 2025-08-12 RX ORDER — ALBUMIN HUMAN 50 G/1000ML
SOLUTION INTRAVENOUS AS NEEDED
Status: DISCONTINUED | OUTPATIENT
Start: 2025-08-12 | End: 2025-08-12

## 2025-08-12 RX ORDER — NORETHINDRONE AND ETHINYL ESTRADIOL 0.5-0.035
KIT ORAL AS NEEDED
Status: DISCONTINUED | OUTPATIENT
Start: 2025-08-12 | End: 2025-08-12

## 2025-08-12 RX ORDER — SIMETHICONE 80 MG
80 TABLET,CHEWABLE ORAL 4 TIMES DAILY PRN
Status: DISCONTINUED | OUTPATIENT
Start: 2025-08-12 | End: 2025-08-15 | Stop reason: HOSPADM

## 2025-08-12 RX ORDER — AZITHROMYCIN MONOHYDRATE 500 MG/5ML
INJECTION, POWDER, LYOPHILIZED, FOR SOLUTION INTRAVENOUS AS NEEDED
Status: DISCONTINUED | OUTPATIENT
Start: 2025-08-12 | End: 2025-08-12

## 2025-08-12 RX ORDER — IBUPROFEN 600 MG/1
600 TABLET, FILM COATED ORAL EVERY 6 HOURS
Status: DISCONTINUED | OUTPATIENT
Start: 2025-08-13 | End: 2025-08-15 | Stop reason: HOSPADM

## 2025-08-12 RX ORDER — SUCCINYLCHOLINE CHLORIDE 20 MG/ML
INJECTION INTRAMUSCULAR; INTRAVENOUS AS NEEDED
Status: DISCONTINUED | OUTPATIENT
Start: 2025-08-12 | End: 2025-08-12

## 2025-08-12 RX ORDER — FENTANYL CITRATE 50 UG/ML
INJECTION, SOLUTION INTRAMUSCULAR; INTRAVENOUS AS NEEDED
Status: DISCONTINUED | OUTPATIENT
Start: 2025-08-12 | End: 2025-08-12

## 2025-08-12 RX ORDER — FAMOTIDINE 10 MG/ML
INJECTION, SOLUTION INTRAVENOUS AS NEEDED
Status: DISCONTINUED | OUTPATIENT
Start: 2025-08-12 | End: 2025-08-12

## 2025-08-12 RX ORDER — CELECOXIB 200 MG/1
400 CAPSULE ORAL ONCE
Status: CANCELLED | OUTPATIENT
Start: 2025-08-12 | End: 2025-08-12

## 2025-08-12 RX ORDER — PHENYLEPHRINE HCL IN 0.9% NACL 0.4MG/10ML
SYRINGE (ML) INTRAVENOUS AS NEEDED
Status: DISCONTINUED | OUTPATIENT
Start: 2025-08-12 | End: 2025-08-12

## 2025-08-12 RX ORDER — VASOPRESSIN 20 [USP'U]/ML
INJECTION, SOLUTION INTRAVENOUS AS NEEDED
Status: DISCONTINUED | OUTPATIENT
Start: 2025-08-12 | End: 2025-08-12

## 2025-08-12 RX ORDER — PROCHLORPERAZINE MALEATE 10 MG
10 TABLET ORAL EVERY 6 HOURS PRN
Status: DISCONTINUED | OUTPATIENT
Start: 2025-08-12 | End: 2025-08-13

## 2025-08-12 RX ORDER — ACETAMINOPHEN 325 MG/1
975 TABLET ORAL EVERY 6 HOURS
Status: DISCONTINUED | OUTPATIENT
Start: 2025-08-12 | End: 2025-08-15 | Stop reason: HOSPADM

## 2025-08-12 RX ORDER — OXYCODONE HYDROCHLORIDE 5 MG/1
5 TABLET ORAL EVERY 4 HOURS PRN
Status: DISCONTINUED | OUTPATIENT
Start: 2025-08-13 | End: 2025-08-15 | Stop reason: HOSPADM

## 2025-08-12 RX ORDER — MISOPROSTOL 200 UG/1
800 TABLET ORAL ONCE
Status: COMPLETED | OUTPATIENT
Start: 2025-08-12 | End: 2025-08-12

## 2025-08-12 RX ORDER — KETOROLAC TROMETHAMINE 30 MG/ML
30 INJECTION, SOLUTION INTRAMUSCULAR; INTRAVENOUS EVERY 6 HOURS
Status: COMPLETED | OUTPATIENT
Start: 2025-08-12 | End: 2025-08-13

## 2025-08-12 RX ORDER — DOXYCYCLINE 100 MG/10ML
INJECTION, POWDER, LYOPHILIZED, FOR SOLUTION INTRAVENOUS AS NEEDED
Status: DISCONTINUED | OUTPATIENT
Start: 2025-08-12 | End: 2025-08-12

## 2025-08-12 RX ORDER — TERBUTALINE SULFATE 1 MG/ML
0.25 INJECTION SUBCUTANEOUS ONCE
Status: COMPLETED | OUTPATIENT
Start: 2025-08-12 | End: 2025-08-12

## 2025-08-12 RX ORDER — CEFAZOLIN 1 G/1
INJECTION, POWDER, FOR SOLUTION INTRAVENOUS AS NEEDED
Status: DISCONTINUED | OUTPATIENT
Start: 2025-08-12 | End: 2025-08-12

## 2025-08-12 RX ORDER — MORPHINE SULFATE 10 MG/ML
INJECTION, SOLUTION INTRAMUSCULAR; INTRAVENOUS AS NEEDED
Status: DISCONTINUED | OUTPATIENT
Start: 2025-08-12 | End: 2025-08-12

## 2025-08-12 RX ORDER — LOPERAMIDE HYDROCHLORIDE 2 MG/1
4 CAPSULE ORAL EVERY 2 HOUR PRN
Status: DISCONTINUED | OUTPATIENT
Start: 2025-08-12 | End: 2025-08-15 | Stop reason: HOSPADM

## 2025-08-12 RX ORDER — PHENYLEPHRINE HCL IN 0.9% NACL 1 MG/10 ML
SYRINGE (ML) INTRAVENOUS AS NEEDED
Status: DISCONTINUED | OUTPATIENT
Start: 2025-08-12 | End: 2025-08-12

## 2025-08-12 RX ORDER — MISOPROSTOL 200 UG/1
TABLET ORAL
Status: DISPENSED
Start: 2025-08-12 | End: 2025-08-13

## 2025-08-12 RX ORDER — FAMOTIDINE 20 MG/1
20 TABLET, FILM COATED ORAL 2 TIMES DAILY
Status: DISCONTINUED | OUTPATIENT
Start: 2025-08-12 | End: 2025-08-15 | Stop reason: HOSPADM

## 2025-08-12 RX ORDER — MIDAZOLAM HYDROCHLORIDE 2 MG/2ML
INJECTION, SOLUTION INTRAMUSCULAR; INTRAVENOUS AS NEEDED
Status: DISCONTINUED | OUTPATIENT
Start: 2025-08-12 | End: 2025-08-12

## 2025-08-12 RX ORDER — ONDANSETRON HYDROCHLORIDE 2 MG/ML
4 INJECTION, SOLUTION INTRAVENOUS EVERY 6 HOURS PRN
Status: DISCONTINUED | OUTPATIENT
Start: 2025-08-12 | End: 2025-08-15 | Stop reason: HOSPADM

## 2025-08-12 RX ORDER — ACETAMINOPHEN 120 MG/1
SUPPOSITORY RECTAL AS NEEDED
Status: DISCONTINUED | OUTPATIENT
Start: 2025-08-12 | End: 2025-08-12

## 2025-08-12 RX ORDER — ALBUTEROL SULFATE 90 UG/1
2 INHALANT RESPIRATORY (INHALATION) EVERY 6 HOURS PRN
Status: DISCONTINUED | OUTPATIENT
Start: 2025-08-13 | End: 2025-08-15 | Stop reason: HOSPADM

## 2025-08-12 RX ORDER — ENOXAPARIN SODIUM 100 MG/ML
40 INJECTION SUBCUTANEOUS EVERY 24 HOURS
Status: DISCONTINUED | OUTPATIENT
Start: 2025-08-12 | End: 2025-08-15 | Stop reason: HOSPADM

## 2025-08-12 RX ORDER — HYDRALAZINE HYDROCHLORIDE 20 MG/ML
5 INJECTION INTRAMUSCULAR; INTRAVENOUS ONCE AS NEEDED
Status: DISCONTINUED | OUTPATIENT
Start: 2025-08-12 | End: 2025-08-15 | Stop reason: HOSPADM

## 2025-08-12 RX ORDER — MISOPROSTOL 200 UG/1
800 TABLET ORAL ONCE AS NEEDED
Status: DISCONTINUED | OUTPATIENT
Start: 2025-08-12 | End: 2025-08-15 | Stop reason: HOSPADM

## 2025-08-12 RX ORDER — CALCIUM GLUCONATE 98 MG/ML
1 INJECTION, SOLUTION INTRAVENOUS ONCE AS NEEDED
Status: DISCONTINUED | OUTPATIENT
Start: 2025-08-12 | End: 2025-08-15 | Stop reason: HOSPADM

## 2025-08-12 RX ORDER — METOCLOPRAMIDE HYDROCHLORIDE 5 MG/ML
10 INJECTION INTRAMUSCULAR; INTRAVENOUS ONCE
Status: COMPLETED | OUTPATIENT
Start: 2025-08-12 | End: 2025-08-12

## 2025-08-12 RX ORDER — TERBUTALINE SULFATE 1 MG/ML
INJECTION SUBCUTANEOUS
Status: COMPLETED
Start: 2025-08-12 | End: 2025-08-12

## 2025-08-12 RX ORDER — DIPHENHYDRAMINE HYDROCHLORIDE 50 MG/ML
25 INJECTION, SOLUTION INTRAMUSCULAR; INTRAVENOUS EVERY 4 HOURS PRN
Status: DISCONTINUED | OUTPATIENT
Start: 2025-08-12 | End: 2025-08-15 | Stop reason: HOSPADM

## 2025-08-12 RX ORDER — PROPOFOL 10 MG/ML
INJECTION, EMULSION INTRAVENOUS AS NEEDED
Status: DISCONTINUED | OUTPATIENT
Start: 2025-08-12 | End: 2025-08-12

## 2025-08-12 RX ORDER — METHYLERGONOVINE MALEATE 0.2 MG/ML
0.2 INJECTION INTRAVENOUS ONCE AS NEEDED
Status: DISCONTINUED | OUTPATIENT
Start: 2025-08-12 | End: 2025-08-15 | Stop reason: HOSPADM

## 2025-08-12 RX ORDER — ONDANSETRON 4 MG/1
4 TABLET, FILM COATED ORAL EVERY 6 HOURS PRN
Status: DISCONTINUED | OUTPATIENT
Start: 2025-08-12 | End: 2025-08-15 | Stop reason: HOSPADM

## 2025-08-12 RX ORDER — LIDOCAINE HCL/PF 100 MG/5ML
SYRINGE (ML) INTRAVENOUS AS NEEDED
Status: DISCONTINUED | OUTPATIENT
Start: 2025-08-12 | End: 2025-08-12

## 2025-08-12 RX ORDER — LABETALOL HYDROCHLORIDE 5 MG/ML
20 INJECTION, SOLUTION INTRAVENOUS ONCE AS NEEDED
Status: COMPLETED | OUTPATIENT
Start: 2025-08-12 | End: 2025-08-14

## 2025-08-12 RX ORDER — PROCHLORPERAZINE 25 MG/1
25 SUPPOSITORY RECTAL EVERY 12 HOURS PRN
Status: DISCONTINUED | OUTPATIENT
Start: 2025-08-12 | End: 2025-08-13

## 2025-08-12 RX ORDER — ADHESIVE BANDAGE
10 BANDAGE TOPICAL
Status: DISCONTINUED | OUTPATIENT
Start: 2025-08-12 | End: 2025-08-15 | Stop reason: HOSPADM

## 2025-08-12 RX ORDER — CARBOPROST TROMETHAMINE 250 UG/ML
250 INJECTION, SOLUTION INTRAMUSCULAR ONCE AS NEEDED
Status: DISCONTINUED | OUTPATIENT
Start: 2025-08-12 | End: 2025-08-15 | Stop reason: HOSPADM

## 2025-08-12 RX ORDER — OXYTOCIN/0.9 % SODIUM CHLORIDE 30/500 ML
60 PLASTIC BAG, INJECTION (ML) INTRAVENOUS ONCE AS NEEDED
Status: COMPLETED | OUTPATIENT
Start: 2025-08-12 | End: 2025-08-12

## 2025-08-12 RX ORDER — POLYETHYLENE GLYCOL 3350 17 G/17G
17 POWDER, FOR SOLUTION ORAL 2 TIMES DAILY PRN
Status: DISCONTINUED | OUTPATIENT
Start: 2025-08-12 | End: 2025-08-15 | Stop reason: HOSPADM

## 2025-08-12 RX ORDER — GABAPENTIN 600 MG/1
600 TABLET ORAL ONCE
Status: CANCELLED | OUTPATIENT
Start: 2025-08-12 | End: 2025-08-12

## 2025-08-12 RX ORDER — ACETAMINOPHEN 325 MG/1
975 TABLET ORAL ONCE
Status: CANCELLED | OUTPATIENT
Start: 2025-08-12 | End: 2025-08-12

## 2025-08-12 RX ORDER — OXYTOCIN 10 [USP'U]/ML
10 INJECTION, SOLUTION INTRAMUSCULAR; INTRAVENOUS ONCE AS NEEDED
Status: DISCONTINUED | OUTPATIENT
Start: 2025-08-12 | End: 2025-08-15 | Stop reason: HOSPADM

## 2025-08-12 RX ORDER — PROCHLORPERAZINE EDISYLATE 5 MG/ML
10 INJECTION INTRAMUSCULAR; INTRAVENOUS EVERY 6 HOURS PRN
Status: DISCONTINUED | OUTPATIENT
Start: 2025-08-12 | End: 2025-08-13

## 2025-08-12 RX ORDER — OXYCODONE HYDROCHLORIDE 10 MG/1
10 TABLET ORAL EVERY 4 HOURS PRN
Status: DISCONTINUED | OUTPATIENT
Start: 2025-08-13 | End: 2025-08-15 | Stop reason: HOSPADM

## 2025-08-12 RX ORDER — CHLOROPROCAINE HYDROCHLORIDE 30 MG/ML
INJECTION, SOLUTION EPIDURAL; INFILTRATION; INTRACAUDAL; PERINEURAL AS NEEDED
Status: DISCONTINUED | OUTPATIENT
Start: 2025-08-12 | End: 2025-08-12

## 2025-08-12 RX ORDER — NALOXONE HYDROCHLORIDE 0.4 MG/ML
0.1 INJECTION, SOLUTION INTRAMUSCULAR; INTRAVENOUS; SUBCUTANEOUS EVERY 5 MIN PRN
Status: DISCONTINUED | OUTPATIENT
Start: 2025-08-12 | End: 2025-08-15 | Stop reason: HOSPADM

## 2025-08-12 RX ORDER — LIDOCAINE 560 MG/1
1 PATCH PERCUTANEOUS; TOPICAL; TRANSDERMAL
Status: DISCONTINUED | OUTPATIENT
Start: 2025-08-12 | End: 2025-08-15 | Stop reason: HOSPADM

## 2025-08-12 RX ORDER — MISOPROSTOL 200 UG/1
TABLET ORAL
Status: COMPLETED
Start: 2025-08-12 | End: 2025-08-12

## 2025-08-12 RX ORDER — MIDAZOLAM HYDROCHLORIDE 1 MG/ML
INJECTION, SOLUTION INTRAMUSCULAR; INTRAVENOUS CONTINUOUS PRN
Status: DISCONTINUED | OUTPATIENT
Start: 2025-08-12 | End: 2025-08-12

## 2025-08-12 RX ORDER — TRANEXAMIC ACID 1 G/10ML
1000 INJECTION, SOLUTION INTRAVENOUS ONCE AS NEEDED
Status: DISCONTINUED | OUTPATIENT
Start: 2025-08-12 | End: 2025-08-15 | Stop reason: HOSPADM

## 2025-08-12 RX ORDER — HYDROMORPHONE HYDROCHLORIDE 0.2 MG/ML
0.2 INJECTION INTRAMUSCULAR; INTRAVENOUS; SUBCUTANEOUS EVERY 5 MIN PRN
Status: DISCONTINUED | OUTPATIENT
Start: 2025-08-12 | End: 2025-08-15 | Stop reason: HOSPADM

## 2025-08-12 RX ORDER — DIPHENHYDRAMINE HCL 25 MG
25 CAPSULE ORAL EVERY 4 HOURS PRN
Status: DISCONTINUED | OUTPATIENT
Start: 2025-08-12 | End: 2025-08-15 | Stop reason: HOSPADM

## 2025-08-12 RX ADMIN — VASOPRESSIN 1 UNITS: 20 INJECTION INTRAVENOUS at 06:19

## 2025-08-12 RX ADMIN — PROCHLORPERAZINE EDISYLATE 10 MG: 5 INJECTION INTRAMUSCULAR; INTRAVENOUS at 11:50

## 2025-08-12 RX ADMIN — SUCCINYLCHOLINE CHLORIDE 120 MG: 20 INJECTION, SOLUTION INTRAMUSCULAR; INTRAVENOUS at 12:54

## 2025-08-12 RX ADMIN — METOCLOPRAMIDE 10 MG: 5 INJECTION, SOLUTION INTRAMUSCULAR; INTRAVENOUS at 09:28

## 2025-08-12 RX ADMIN — ONDANSETRON 4 MG: 2 INJECTION INTRAMUSCULAR; INTRAVENOUS at 12:48

## 2025-08-12 RX ADMIN — OXYTOCIN 600 MILLI-UNITS/MIN: 10 INJECTION, SOLUTION INTRAMUSCULAR; INTRAVENOUS at 05:57

## 2025-08-12 RX ADMIN — NIFEDIPINE 60 MG: 60 TABLET, FILM COATED, EXTENDED RELEASE ORAL at 08:11

## 2025-08-12 RX ADMIN — ALBUMIN HUMAN 250 ML: 0.05 INJECTION, SOLUTION INTRAVENOUS at 13:10

## 2025-08-12 RX ADMIN — EPHEDRINE SULFATE 10 MG: 50 INJECTION INTRAVENOUS at 13:25

## 2025-08-12 RX ADMIN — Medication 120 MCG: at 05:56

## 2025-08-12 RX ADMIN — MAGNESIUM SULFATE HEPTAHYDRATE 2 G/HR: 40 INJECTION, SOLUTION INTRAVENOUS at 03:06

## 2025-08-12 RX ADMIN — Medication 60 MILLI-UNITS/MIN: at 09:45

## 2025-08-12 RX ADMIN — SUCCINYLCHOLINE CHLORIDE 180 MG: 20 INJECTION, SOLUTION INTRAMUSCULAR; INTRAVENOUS at 05:51

## 2025-08-12 RX ADMIN — TERBUTALINE SULFATE 0.25 MG: 1 INJECTION SUBCUTANEOUS at 05:32

## 2025-08-12 RX ADMIN — Medication: at 08:59

## 2025-08-12 RX ADMIN — DEXAMETHASONE SODIUM PHOSPHATE 4 MG: 4 INJECTION, SOLUTION INTRA-ARTICULAR; INTRALESIONAL; INTRAMUSCULAR; INTRAVENOUS; SOFT TISSUE at 06:15

## 2025-08-12 RX ADMIN — HYDROMORPHONE HYDROCHLORIDE 0.2 MG: 2 INJECTION INTRAMUSCULAR; INTRAVENOUS; SUBCUTANEOUS at 07:05

## 2025-08-12 RX ADMIN — Medication: at 09:32

## 2025-08-12 RX ADMIN — VASOPRESSIN 1 UNITS: 20 INJECTION INTRAVENOUS at 06:50

## 2025-08-12 RX ADMIN — HYDROMORPHONE HYDROCHLORIDE 0.2 MG: 0.2 INJECTION, SOLUTION INTRAMUSCULAR; INTRAVENOUS; SUBCUTANEOUS at 08:12

## 2025-08-12 RX ADMIN — MISOPROSTOL 800 MCG: 200 TABLET ORAL at 07:00

## 2025-08-12 RX ADMIN — FENTANYL CITRATE 100 MCG: 50 INJECTION, SOLUTION INTRAMUSCULAR; INTRAVENOUS at 05:59

## 2025-08-12 RX ADMIN — ACETAMINOPHEN 975 MG: 325 TABLET ORAL at 20:39

## 2025-08-12 RX ADMIN — KETOROLAC TROMETHAMINE 30 MG: 30 INJECTION, SOLUTION INTRAMUSCULAR; INTRAVENOUS at 20:39

## 2025-08-12 RX ADMIN — ONDANSETRON 4 MG: 2 INJECTION INTRAMUSCULAR; INTRAVENOUS at 06:15

## 2025-08-12 RX ADMIN — DOXYCYCLINE 200 MG: 100 INJECTION, POWDER, LYOPHILIZED, FOR SOLUTION INTRAVENOUS at 13:02

## 2025-08-12 RX ADMIN — MIDAZOLAM HYDROCHLORIDE 2 MG: 1 INJECTION, SOLUTION INTRAMUSCULAR; INTRAVENOUS at 05:59

## 2025-08-12 RX ADMIN — ACETAMINOPHEN 650 MG: 120 SUPPOSITORY RECTAL at 06:56

## 2025-08-12 RX ADMIN — LIDOCAINE HYDROCHLORIDE 70 MG: 20 INJECTION, SOLUTION INTRAVENOUS at 12:54

## 2025-08-12 RX ADMIN — TRANEXAMIC ACID 1000 MG: 100 INJECTION INTRAVENOUS at 09:46

## 2025-08-12 RX ADMIN — Medication 1 DOSE: at 07:44

## 2025-08-12 RX ADMIN — EPHEDRINE SULFATE 5 MG: 50 INJECTION INTRAVENOUS at 13:21

## 2025-08-12 RX ADMIN — CHLOROPROCAINE HYDROCHLORIDE 5 ML: 30 INJECTION, SOLUTION EPIDURAL; INFILTRATION; INTRACAUDAL; PERINEURAL at 05:43

## 2025-08-12 RX ADMIN — Medication 200 MCG: at 06:32

## 2025-08-12 RX ADMIN — Medication 80 MCG: at 12:54

## 2025-08-12 RX ADMIN — AZITHROMYCIN 500 MG: 500 INJECTION, POWDER, LYOPHILIZED, FOR SOLUTION INTRAVENOUS at 05:54

## 2025-08-12 RX ADMIN — FAMOTIDINE 20 MG: 10 INJECTION INTRAVENOUS at 12:48

## 2025-08-12 RX ADMIN — ALBUMIN HUMAN 250 ML: 0.05 INJECTION, SOLUTION INTRAVENOUS at 13:33

## 2025-08-12 RX ADMIN — MAGNESIUM SULFATE HEPTAHYDRATE 2 G/HR: 40 INJECTION, SOLUTION INTRAVENOUS at 17:21

## 2025-08-12 RX ADMIN — NIFEDIPINE 60 MG: 60 TABLET, FILM COATED, EXTENDED RELEASE ORAL at 19:31

## 2025-08-12 RX ADMIN — CEFAZOLIN 2 G: 1 INJECTION, POWDER, FOR SOLUTION INTRAMUSCULAR; INTRAVENOUS at 05:53

## 2025-08-12 RX ADMIN — ACETAMINOPHEN 975 MG: 325 TABLET ORAL at 14:25

## 2025-08-12 RX ADMIN — KETOROLAC TROMETHAMINE 30 MG: 30 INJECTION, SOLUTION INTRAMUSCULAR; INTRAVENOUS at 14:25

## 2025-08-12 RX ADMIN — Medication 80 MCG: at 13:01

## 2025-08-12 RX ADMIN — VASOPRESSIN 1 UNITS: 20 INJECTION INTRAVENOUS at 06:41

## 2025-08-12 RX ADMIN — PROPOFOL 160 MG: 10 INJECTION, EMULSION INTRAVENOUS at 12:54

## 2025-08-12 RX ADMIN — NORETHINDRONE AND ETHINYL ESTRADIOL 25 MG: KIT ORAL at 06:59

## 2025-08-12 RX ADMIN — VASOPRESSIN 1 UNITS: 20 INJECTION INTRAVENOUS at 06:23

## 2025-08-12 RX ADMIN — CHLOROPROCAINE HYDROCHLORIDE 5 ML: 30 INJECTION, SOLUTION EPIDURAL; INFILTRATION; INTRACAUDAL; PERINEURAL at 05:47

## 2025-08-12 RX ADMIN — Medication 160 MCG: at 06:03

## 2025-08-12 RX ADMIN — HYDROMORPHONE HYDROCHLORIDE 0.2 MG: 0.2 INJECTION, SOLUTION INTRAMUSCULAR; INTRAVENOUS; SUBCUTANEOUS at 11:42

## 2025-08-12 RX ADMIN — Medication 200 MCG: at 13:10

## 2025-08-12 RX ADMIN — EPHEDRINE SULFATE 10 MG: 50 INJECTION INTRAVENOUS at 05:47

## 2025-08-12 RX ADMIN — SODIUM CHLORIDE, SODIUM LACTATE, POTASSIUM CHLORIDE, AND CALCIUM CHLORIDE: 600; 310; 30; 20 INJECTION, SOLUTION INTRAVENOUS at 05:50

## 2025-08-12 RX ADMIN — PROPOFOL 200 MG: 10 INJECTION, EMULSION INTRAVENOUS at 05:51

## 2025-08-12 RX ADMIN — HYDROMORPHONE HYDROCHLORIDE 0.3 MG: 2 INJECTION INTRAMUSCULAR; INTRAVENOUS; SUBCUTANEOUS at 06:58

## 2025-08-12 RX ADMIN — MORPHINE SULFATE 10 MG: 10 INJECTION INTRAVENOUS at 06:13

## 2025-08-12 RX ADMIN — Medication 120 MCG: at 13:05

## 2025-08-12 RX ADMIN — Medication: at 12:30

## 2025-08-12 ASSESSMENT — PAIN SCALES - GENERAL
PAINLEVEL_OUTOF10: 0 - NO PAIN
PAINLEVEL_OUTOF10: 7
PAINLEVEL_OUTOF10: 0 - NO PAIN
PAINLEVEL_OUTOF10: 0 - NO PAIN
PAINLEVEL_OUTOF10: 5 - MODERATE PAIN
PAINLEVEL_OUTOF10: 0 - NO PAIN
PAINLEVEL_OUTOF10: 0 - NO PAIN
PAINLEVEL_OUTOF10: 5 - MODERATE PAIN
PAINLEVEL_OUTOF10: 0 - NO PAIN
PAINLEVEL_OUTOF10: 0 - NO PAIN
PAINLEVEL_OUTOF10: 5 - MODERATE PAIN
PAINLEVEL_OUTOF10: 0 - NO PAIN
PAINLEVEL_OUTOF10: 8
PAINLEVEL_OUTOF10: 0 - NO PAIN

## 2025-08-12 ASSESSMENT — PAIN - FUNCTIONAL ASSESSMENT
PAIN_FUNCTIONAL_ASSESSMENT: 0-10
PAIN_FUNCTIONAL_ASSESSMENT: 0-10

## 2025-08-12 ASSESSMENT — PAIN DESCRIPTION - DESCRIPTORS
DESCRIPTORS: ACHING;SHARP;SORE
DESCRIPTORS: ACHING;SORE

## 2025-08-12 ASSESSMENT — PAIN DESCRIPTION - LOCATION: LOCATION: ABDOMEN

## 2025-08-12 NOTE — SIGNIFICANT EVENT
"Labor Progress Note    Subjective: To bedside for IUPC placement. Not tracing contractions     Objective:  Vitals: /82   Pulse 99   Temp 36.7 °C (98.1 °F) (Oral)   Resp 18   Ht 1.676 m (5' 5.98\")   Wt 94.5 kg (208 lb 5.4 oz)   LMP 12/19/2024   SpO2 97%   BMI 33.64 kg/m²     SVE: 4/50/-3     CEFM: Baseline- 125bpm, mod variability, - accels, +decels  [place]: ctx q2-4min     Assessment/Plan:    IOL   -s/p ripening w/ crb & pit, pit start @ 1245, AROM @0030, SVE as above   -IUPC placed for difficulty placing contractions  -CEFM, currently Cat II, noted new intermittent decels but unable to categorize due to period of inability to trace contractions externally, tracing however remains overall reassuring, mod reza maintained and appropriate recovery in between, cont to monitor   -epidural infusing  -GBS neg     sPEC  -Nifed 60/60, labetalol 200mg BID   -normotensive to MRBPs   -Mg infusing     D/w Dr. Venkat Cope MD  OBGYN, PGY-2       **Update**  Shortly after exiting room, pt requested to try hands and knees position, IUPC inadvertently pulled out as repositioning  Returned to room, nurse stated she would try external tracing again while on hands and knees  Will return for replacement of IUPC if continued inability to trace contractions    "

## 2025-08-12 NOTE — SIGNIFICANT EVENT
Significant Event Note    Called to bedside. External monitoring unsuccessful. Unable to trace contractions. IUPC replaced without issue.     Saritha Cope MD  OBGYN, PGY-2

## 2025-08-12 NOTE — SIGNIFICANT EVENT
"Pt is lying comfortably in bed. Denies HA, CP, SOB, RUQ pain and vision changes. Feels like she is feeling much more like herself. Breathing well without trouble now. Is no longer having difficulty mentating. Questions about procedure answered to patient satisfaction. Amenable to restarting magnesium if that is the recommendation.     /72   Pulse 93   Temp 36.6 °C (97.9 °F) (Oral)   Resp 16   Ht 1.676 m (5' 5.98\")   Wt 94.5 kg (208 lb 5.4 oz)   LMP 12/19/2024   SpO2 98%   Breastfeeding Unknown   BMI 33.64 kg/m²      Constitutional: No visible distress, alert and cooperative  Eyes: clear sclera  Head/Neck: Normocephalic  Respiratory/Thorax: Normal respiratory effort on RA, CTAB  Cardiovascular: RRR, no murmurs, rubs or gallops  Gastrointestinal: Abdomen soft, no tenderness to palpation, without rebound or guarding  Musculoskeletal: grossly normal ROM  Extremities: BLEs symmetrical, no edema noted   Neurological: A&Ox3, reflexes are present and symmetrical in BUE   Psychological: Appropriate mood and behavior  Skin: warm, dry, no lesions      Patient overall and doing stable at this time. Visibly much improved from last evaluation. During evaluation, satting at 99% on RA and physical exam without concerns. Discussed with patient that despite she was feeling subjectively unwell while on magnesium, her level that was drawn was not above the therapeutic range and that the safest thing to prevent seizures and possibly death in the s/o PEC w SF would be to continue the mag and patient is amenable. Discussed things to reach out to about to providers. Currently declines any symptoms. Will watch closely. 4 hour post op labs drawn during evaluation, will follow up. Pt amenable to blood transfusion if needed.     Gillian Mcmahon MD PGY-2  Obstetrics & Gynecology   "

## 2025-08-12 NOTE — OP NOTE
Date: 2025  OR Location: [facility] OB    Name: Cass Ngo, : 1985, Age: 40 y.o., MRN: [id number], Sex: female    Diagnosis  Pre-op Diagnosis      * Secondary postpartum hemorrhage (HHS-HCC) [O72.2] Post-op Diagnosis     * Secondary postpartum hemorrhage (HHS-HCC) [O72.2]     Procedures    * DILATION AND CURETTAGE      Surgeons      * Gloria Sood - Primary    Resident/Fellow/Other Assistant:  Gillian Mcmahon MD     Staff:   Scrub Person: Zahira  Anesthesia Staff: Anesthesiologist: Leann Julien MD  C-AA: PEPE Coulter  JEAN: Olga Valencia    Procedure Summary  Anesthesia: General  ASA: III  Estimated Blood Loss: 500mL  Intra-op Medications: * Intraprocedure medication information is unavailable because the case start and end events have not been set *           Anesthesia Record               Intraprocedure I/O Totals          Intake    LR 2000.00 mL    Oxytocin Drip 390.60 mL    The total shown is the total volume documented since Anesthesia Start was filed.    Total Intake 2390.6 mL       Output    Urine 1400 mL    Total Output 1400 mL       Net    Net Volume 990.6 mL          Specimen:   ID Type Source Tests Collected by Time   1 : right fallopian tube Tissue FALLOPIAN TUBE LIGATION RIGHT SURGICAL PATHOLOGY EXAM Greer Parker MD 2025 0619   2 : left fallopian tube Tissue FALLOPIAN TUBE LIGATION LEFT SURGICAL PATHOLOGY EXAM Greer Parker MD 2025 0622   3 : placenta Tissue PLACENTA SINGLE SURGICAL PATHOLOGY EXAM Greer Parker MD 2025 0559         Procedure Details:  The patient was seen in the preoperative area. The site of surgery was properly noted/marked if necessary per policy. The patient has been actively warmed in preoperative area. Preoperative antibiotics are not indicated. Doxy given. Venous thrombosis prophylaxis have been ordered including bilateral sequential compression devices.    The patient was taken to the operating room where GA was  administered. She was then positioned in the dorsal lithotomy position and a bimanual exam was performed. Large volume clot was removed from the vagina on multiple manual removals. The patient was then prepped and draped in the normal sterile fashion. Next, right kathrin retractors were placed in the vagina. The cervix was noted already to be dilated, and the anterior lip was grasped with a ring forcep. Bedside ultrasound revealed blood products in the uterus.  A 14mm curved suction curette was connected to the suction, placed in the cervix and a suction curettage was performed under ultrasound guidance. Blood products and what appeared to be placental tissue was removed and seen within the suction tubing. Several passes were made with the suction curettage until no further clot was visualized on ultrasound and a thin endometrial stripe was noted.  Sharp curettage was then performed using a banjo curette until a good uterine cry was noted on all four walls of the uterus. Trickling was noted again on visualization of cervix and another suction curettage pass was performed with more small amounts of placental-appearing tissue. On evaluation, no bleeding of any time from cervix. One final sharp curettage was performed and circumferential cry was noted. All instruments were removed from the vagina and hemostasis was again confirmed. 800mcg rectal cytotec administered. On bimanual exam, fundus was firm and there was no apparent uterine atony.  The patient tolerated the procedure well.    Dr. Sood was present for all key portions of the procedure    Findings: Blood in the vaginal vault with large volume clot in the vaginal vault and uterine cavity, placental-appearing tissue removed during D&C, thin endometrial stripe at end of procedure, normal external genitalia, normal vagina, dilated cervix without other abnormalities     Complications:  None; patient tolerated the procedure well.     Disposition: PACU - hemodynamically  stable.  Condition: stable

## 2025-08-12 NOTE — PROGRESS NOTES
Postpartum Progress Note    Assessment/Plan   Cass Ngo is a 40 y.o.  now POD0 s/p urgent , Low Transverse on 2025 iso NRFHT remote from delivery admitted for postpartum magnesium in the setting of severe pre-eclampsia.     Severe pre-eclampsia  Diagnosed by severe range BPs requiring IV treatment, last got lab 20  @ 0500  Current antihypertensive regimen: nifed 60/60, Lab 200 BID currently HELD  HELLP labs wnl x2  Asymptomatic  UOP adequate  No signs/symptoms of mag toxicity  Continue magnesium 2g/hr for seizure prophylaxis until 0600     Postoperative Care  - Pain well controlled with current regimen  - Hgb 12.2 on admission >  > POD#1 CBC pending  - Tolerating regular diet with antiemetics and bowel regimen prn  - dc randall when off Mg > follow up void  - Not ambulating while on magnesium  -2L simple mask oxygen requirement mostly when sleeping, lungs clear, asymptomatic, other VSS, low concern for acute cardiopulmonary process at this time, will continue to monitor    Postpartum Care  - Feeding: plans to pump; Lactation consult as needed  - ppBC: pp tubal  - Mood: no concerns  - Rubella immune  - Rh pos  - DVT prophylaxis: SCDs    Pregnancy notables:  Asthma, albuterol  AMA, cfDNA with Trisomy 21   Fetal complete common AV canal defect  Laparoscopic ovarian de-torsion and cyst drainage @ 21wga  Grand multip T&C 1 unit  Lap ellie 2007-tere  Hx gHTN & FGR in previous pregnancies  Prenatal pap ASCUS HPV neg     Dispo  - Anticipate d/c on PPD #3 if meeting all postpartum milestones    Patient seen and discussed with Dr. Indigo Dawn MD  OBGYN    Subjective   She is resting comfortably in bed. She is waking up from the anesthesia. She is feeling increased pain and just got a dose of dilaudid.    Denies HA, vision changes, CP, SOB, or RUQ pain.   Denies dizziness/lightheadedness, palpitations, extreme fatigue, heavy bleeding.    Objective   Last Vitals:  Temp Pulse  Resp BP MAP Pulse Ox   36 °C (96.8 °F) 77 20 124/89   97 %     Vitals Min/Max Last 24 Hours:  Temp  Min: 35.7 °C (96.3 °F)  Max: 36.7 °C (98.1 °F)  Pulse  Min: 77  Max: 116  Resp  Min: 16  Max: 20  BP  Min: 119/73  Max: 170/102    Intake/Output:    Intake/Output Summary (Last 24 hours) at 8/12/2025 0754  Last data filed at 8/12/2025 0706  Gross per 24 hour   Intake 4913.47 ml   Output 5315 ml   Net -401.53 ml       Physical Examination:  General: Well appearing, alert  HEENT: normocephalic, EOMI  Cardio: RRR  Resp: CTAB, no crackles, no wheezing  Abd: bandage in place, fundus palpated below the umbilicus and slightly deviated to the right   Neuro: DTRs2+  Extremities: no calf tenderness  Psych: A&O x3, appropriate mood and affect     Lab Review:  Results from last 7 days   Lab Units 08/11/25  0657 08/08/25 2011 08/08/25  1156   WBC AUTO x10*3/uL 9.8 9.7 9.0   HEMOGLOBIN g/dL 11.9* 12.2 12.2   HEMATOCRIT % 36.1 37.9 36.0   PLATELETS AUTO x10*3/uL 180 182 169   AST U/L 17 14 16   ALT U/L 14 10 10   CREATININE mg/dL 0.59 0.71 0.68

## 2025-08-12 NOTE — NURSING NOTE
Venkat LEIGH notified of category 2 tracing due to minimal variability and variable decelerations, and asked to review tracing. Because tracing is not meeting criteria to start pitocin, RN requests override note from provider to be able to start pitocin.

## 2025-08-12 NOTE — SIGNIFICANT EVENT
To bedside for report of 500cc vaginal bleeding during episode of emesis    On arrival to bedside, patient denies any dizziness/lightheadedness.   Fundus firm without additional bleeding with fundal pressure.    BSUS performed with overall thin endometrial stripe noted.     Suspect large volume of blood due to expulsion of intrauterine clot, no ongoing bleeding at this time. Thin endometrial stripe reassuring.   Discussed with patient option for bimanual exam with pain medication though given her CS under GETA and current postoperative discomfort, suspect this will be very uncomfortable for her and she may need OR for D&C with improved pain control. Given reassuring exam and US can consider conservative management with uterotonics. Patient desires to forego bimanual exam and trial uterotonics.  Will give 2nd pit bolus and TXA at this time. (Previously s/p cytotec, contraindications to methergine and hemabate)    Will continue to monitor bleeding closely    Susan Daily MD  PGY-4, Obstetrics and Gynecology

## 2025-08-12 NOTE — PROGRESS NOTES
Postpartum Progress Note    Assessment/Plan   Cass Ngo is a 40 y.o.  now POD0 s/p urgent , Low Transverse on 2025 iso NRFHT remote from delivery admitted for postpartum magnesium in the setting of severe pre-eclampsia.      Severe pre-eclampsia  Diagnosed by severe range BPs requiring IV treatment, last got lab 20  @ 0500  Current antihypertensive regimen: nifed 60/60, Lab 200 BID currently HELD this AM due to normotensive BP  HELLP labs wnl x2  Asymptomatic  2L simple mask oxygen requirement mostly when sleeping, lungs clear, asymptomatic, other VSS, low concern for acute cardiopulmonary process at this time, CXR pending  Continue magnesium 2g/hr for seizure prophylaxis until 0600      Postoperative Care  Delayed PPH  - s/p D&C on POD#0 due to delayed PPH, total EBL 1400 since delivery  - Pain well controlled with current regimen  - Hgb 12.2 on admission > EBL 1400 since delivery > Hgb 8.8 ~ 3 hours from D&C > for AM CBC  - Fibrinogen stable on repeat after procedure, coags wnl  - Tolerating regular diet with antiemetics and bowel regimen prn  - continue routine postpartum care     Postpartum Care  - Feeding: plans to pump; Lactation consult as needed  - ppBC: pp tubal  - Mood: no concerns  - Rubella immune  - Rh pos  - DVT prophylaxis: SCDs     Dispo  - Anticipate d/c on PPD #3 if meeting all postpartum milestones     Patient seen and discussed with Dr. Paola Robledo MD PGY-4    Subjective   Her pain is well controlled with current medications  She is tolerating a Adult diet Regular  Her plan for contraception is tubal ligation     Patient denies any HA, visual changes, CP, SOB, RUQ pain. Lochia minimal.    Objective     Last Vitals:  Temp Pulse Resp BP MAP Pulse Ox   36.3 °C (97.3 °F) 91 17 123/77 92 97 %     Vitals Min/Max Last 24 Hours:  Temp  Min: 35.7 °C (96.3 °F)  Max: 36.8 °C (98.2 °F)  Pulse  Min: 75  Max: 116  Resp  Min: 13  Max: 20  BP  Min: 97/64  Max:  146/87  MAP (mmHg)  Min: 74  Max: 111    Intake/Output:     Intake/Output Summary (Last 24 hours) at 8/13/2025 0137  Last data filed at 8/13/2025 0050  Gross per 24 hour   Intake 5913.9 ml   Output 7724 ml   Net -1810.1 ml       Physical Exam:  General: no acute distress  HEENT: normocephalic, atraumatic  Heart: warm and well perfused, RRR  Lungs: breathing comfortably on room air, CTAB  Abdomen: soft, mildly tender to palpation  Extremities: moving all extremities  Neuro: awake and conversant, 2+ DTR in UE  Psych: appropriate mood and affect    Lab Data:  Lab Results   Component Value Date    WBC 15.7 (H) 08/12/2025    HGB 8.8 (L) 08/12/2025    HCT 25.3 (L) 08/12/2025     08/12/2025     Lab Results   Component Value Date    GLUCOSE 90 08/11/2025     08/11/2025    K 3.9 08/11/2025     08/11/2025    CO2 23 08/11/2025    ANIONGAP 13 08/11/2025    BUN 10 08/11/2025    CREATININE 0.59 08/11/2025    EGFR >90 08/11/2025    CALCIUM 8.7 08/11/2025    ALBUMIN 3.4 08/11/2025    PROT 5.7 (L) 08/11/2025    ALKPHOS 113 (H) 08/11/2025    ALT 14 08/11/2025    AST 17 08/11/2025    BILITOT 0.5 08/11/2025

## 2025-08-12 NOTE — L&D DELIVERY NOTE
Birth Operative Report    Patient Name: Cass Ngo  : 1985  MRN: [id number]  Age: 40 y.o.    /Para:   Gestational Age: 33w5d    Date of Surgery: 2025    Operating Room Location: [facility] 2 OR 3    Pre-op Diagnosis:  Intrauterine Pregnancy at 33w5d  sPEC   NRFHT Remote from Delivery    Post-op Diagnosis:  Same    Procedure:   Primary Low Transverse  Section and Bilateral Tubal Ligation    Surgery Category at [facility] Time: Confirmed Urgent    Surgeon:    * Greer Parker - Primary    Resident/Fellow/Other Assistant:   Surgeons and Role:     * Saritha Cope MD - Resident - Assisting     * Silvia Robledo MD - Resident- Assisting    Anesthesia:  Type: epidural     Anesthesiologist: Diane Paul MD MPH; Leann Julien MD  CRNA: WILI Gotti-LASHAE  C-AA: PEPE Leo    Surgical Staff:  Circulator: Susan Underwood RN  Scrub Person: Deena Lucero     Preoperative Antibiotics: Ancef 2 g, Azithromycin    Indication for Procedure:   40 y.o.  at 33w5d undergoing IOL in s/o sPEC. Decision made for urgent CS due to NRFHT remote from delivery.     Informed Consent:  The risks, benefits, complications, and alternatives were discussed with the patient. The patient understood that the risks of  section include but are not limited to infection, bleeding, injury to nearby structures or organs, possible need for transfusion, and potential need for more surgery. The patient stated understanding and desired to proceed. All questions were answered. The site of surgery was properly noted and marked. The patient's identity was confirmed, and the procedure verified as a  delivery. A Time Out was held and the above information confirmed.     Findings:  Normal appearing gravid uterus, fallopian tubes, and ovaries. Amniotic fluid Clear, Female infant in Vertex Occiput Posterior presentation, APGARS 7 , 8 .  Birth Weight 1.77 kg.    Description of  Procedure:   Patient was taken to the OR. Fetal bradycardia persisted after arrival. Decision was made for STAT section. She was placed in the dorsal supine position with a left lateral tilt. A randall catheter was placed, SCDs were applied, and a vaginal prep was performed. A pre-procedure time out was performed. The patient was placed under general anesthesia. The patient was given a prophylactic dose of IV antibiotics.  Betadine splash was performed and the patient was draped in the usual sterile fashion.     A Pfannenstiel skin incision was made with the scalpel through the skin and subcutaneous fat to the underlying fascial layer. The fascia was incised in the midline with the scalpel and the incision was extended bilaterally. The muscles were divided in the midline, the peritoneum was then identified and entered bluntly and  the space was extended superiorly and inferiorly taking care to avoid underlying viscera.  The bladder blade was inserted.       Uterine incision was made with the scalpel, the uterine cavity was entered, and the hysterotomy was extended cephalocaudally by stretching. The infant was delivered atraumatically, the cord was clamped and cut and infant was handed off to awaiting nursing.  A cord segment and blood sample were collected. The placenta was then expressed. The uterus was cleared of all clot and debris. The uterine incision was repaired using a running locked stitch of 0 vicryl. A second horizontal imbricating layer was placed using the same stitch. Adequate hemostasis was noted.    Attention was then turned to the bilateral fallopian tubes. The right fallopian tube was identified, grasped and gently tented up using Colp clamps. The miniligasure device was used to remove the fallopian tube, starting at the fimbrae and moving proximally. Good hemostasis was noted at the pedicle. The left fallopian tube was removed in a similar fashion with the Ligasure device. Both pedicles were  hemostatic.   Both tubal segments were sent to pathology.     The hysterotomy was again evaluated and found to be hemostatic, the underside of the fascia, bladder, and the rectus muscles were also found to be hemostatic. The fascia was closed using a running stitch of 0-PDS.  The subcutaneous layer was irrigated, small bleeders were cauterized. The subcutaneous layer was re-approximated with 3 interrupted sutures of 2-0 monocryl. The skin was closed with 4-0 monocryl.         * Greer Parker - Primary was present for key portions of the procedure.    EBL: 850         Anesthesia Record               Intraprocedure I/O Totals          Intake    LR 2000.00 mL    Oxytocin Drip 41.60 mL    The total shown is the total volume documented since Anesthesia Start was filed.    Total Intake 2041.6 mL       Output    Urine 1400 mL    Total Output 1400 mL       Net    Net Volume 641.6 mL          Uterotonics/Hemostatic Agent: Routine IV Pitocin 30 units, rectal cytotec    Specimen:   Placenta  Delivered: 2025  5:59 AM  Appearance: Intact  Removal: Expressed    Disposition: pathology    Sponge/Instrument/Needle Counts: The sponge, lap and needle counts were correct.    Patient Disposition: Patient recovering on labor and delivery in stable condition.    Kenyetta Ngo [[id number]]      Labor Events    Rupture date/time: 2025 0024  Rupture type: Artificial  Fluid color: Clear  Labor type: Induced Onset of Labor  Labor allowed to proceed with plans for an attempted vaginal birth?: Yes  Induction: Brumfield/EASI, AROM, Oxytocin  First cervical ripening date/time: 2025  Induction date/time: 20258  Induction indications: Hypertensive Disorder of Pregnancy  Complications: Fetal Intolerance       Labor Event Times    Labor onset date/time: 2025  Decision date/time (emergent ): 2025 05:38       Placenta    Placenta delivery date/time: 2025 05:59  Placenta removal:  Expressed  Placenta appearance: Intact  Placenta disposition: pathology       Cord    Vessels: 3 vessels  Complications: Nuchal  Nuchal intervention: reduced  Nuchal cord description: loose nuchal cord  Number of loops: 1  Delayed cord clamping?: No  Cord clamped date/time: 2025 05:57:00  Cord blood disposition: Lab  Gases sent?: Yes  Stem cell collection (by provider): No       Anesthesia    Method: General, Epidural       Operative Delivery    Forceps attempted?: No  Vacuum extractor attempted?: No       Shoulder Dystocia    Shoulder dystocia present?: No        Delivery    Time head delivered: 2025 05:57:00  Birth date/time: 2025 05:57:00  Delivery type: , Low Transverse   categorization: primary   priority: urgent  Indications for : Fetal Intolerance of Labor  Complications: Fetal Intolerance       Resuscitation    Method: Suctioning       Apgars    Living status: Living  Apgar Component Scores:  1 min.:  5 min.:  10 min.:  15 min.:  20 min.:    Skin color:  1  1       Heart rate:  2  2       Reflex irritability:  1  2       Muscle tone:  2  1       Respiratory effort:  1  2       Total:  7  8       Apgars assigned by: KATRIN LOCKE       Delivery Providers    Delivering clinician: Greer Parker MD   Provider Role    Susan Underwood, RN Delivery Nurse    Loan Goode RN Nursery Nurse    Saritha Cope MD Resident    Silvia Robledo MD Resident                Saritha Cope MD  OBGYN, PGY-2    No

## 2025-08-12 NOTE — LACTATION NOTE
This note was copied from a baby's chart.  Lactation Consultant Note    Recommendations/Summary  Met with Dad at patient bedside. Explained availability of [facility] LC services. He reports Mom will pump and breastfeeding Monserrat.  Invited to contact LC services as needed.

## 2025-08-12 NOTE — SIGNIFICANT EVENT
Tracing reviewed, ok to start pitocin and titrate per protocol. Suspect minimal variability from magnesium infusion. Will continue to monitor closely.     Greer Parker MD

## 2025-08-13 LAB
ERYTHROCYTE [DISTWIDTH] IN BLOOD BY AUTOMATED COUNT: 13.1 % (ref 11.5–14.5)
HCT VFR BLD AUTO: 23.4 % (ref 36–46)
HGB BLD-MCNC: 7.8 G/DL (ref 12–16)
MCH RBC QN AUTO: 27.8 PG (ref 26–34)
MCHC RBC AUTO-ENTMCNC: 33.3 G/DL (ref 32–36)
MCV RBC AUTO: 83 FL (ref 80–100)
NRBC BLD-RTO: 0 /100 WBCS (ref 0–0)
PLATELET # BLD AUTO: 176 X10*3/UL (ref 150–450)
RBC # BLD AUTO: 2.81 X10*6/UL (ref 4–5.2)
WBC # BLD AUTO: 11.3 X10*3/UL (ref 4.4–11.3)

## 2025-08-13 PROCEDURE — 2500000002 HC RX 250 W HCPCS SELF ADMINISTERED DRUGS (ALT 637 FOR MEDICARE OP, ALT 636 FOR OP/ED)

## 2025-08-13 PROCEDURE — 2500000001 HC RX 250 WO HCPCS SELF ADMINISTERED DRUGS (ALT 637 FOR MEDICARE OP)

## 2025-08-13 PROCEDURE — 2500000004 HC RX 250 GENERAL PHARMACY W/ HCPCS (ALT 636 FOR OP/ED): Mod: JZ

## 2025-08-13 PROCEDURE — 36415 COLL VENOUS BLD VENIPUNCTURE: CPT

## 2025-08-13 PROCEDURE — 85027 COMPLETE CBC AUTOMATED: CPT

## 2025-08-13 PROCEDURE — 2500000004 HC RX 250 GENERAL PHARMACY W/ HCPCS (ALT 636 FOR OP/ED)

## 2025-08-13 PROCEDURE — 99199 UNLISTED SPECIAL SVC PX/RPRT: CPT

## 2025-08-13 PROCEDURE — 1210000001 HC SEMI-PRIVATE ROOM DAILY

## 2025-08-13 RX ORDER — DIPHENHYDRAMINE HYDROCHLORIDE 50 MG/ML
50 INJECTION, SOLUTION INTRAMUSCULAR; INTRAVENOUS EVERY 5 MIN PRN
Status: DISCONTINUED | OUTPATIENT
Start: 2025-08-13 | End: 2025-08-15 | Stop reason: HOSPADM

## 2025-08-13 RX ADMIN — KETOROLAC TROMETHAMINE 30 MG: 30 INJECTION, SOLUTION INTRAMUSCULAR; INTRAVENOUS at 02:55

## 2025-08-13 RX ADMIN — IBUPROFEN 600 MG: 600 TABLET ORAL at 09:16

## 2025-08-13 RX ADMIN — ACETAMINOPHEN 975 MG: 325 TABLET ORAL at 02:55

## 2025-08-13 RX ADMIN — ACETAMINOPHEN 975 MG: 325 TABLET ORAL at 14:51

## 2025-08-13 RX ADMIN — NIFEDIPINE 60 MG: 60 TABLET, FILM COATED, EXTENDED RELEASE ORAL at 18:44

## 2025-08-13 RX ADMIN — IBUPROFEN 600 MG: 600 TABLET ORAL at 20:07

## 2025-08-13 RX ADMIN — ACETAMINOPHEN 975 MG: 325 TABLET ORAL at 09:16

## 2025-08-13 RX ADMIN — FAMOTIDINE 20 MG: 20 TABLET, FILM COATED ORAL at 09:16

## 2025-08-13 RX ADMIN — NIFEDIPINE 60 MG: 60 TABLET, FILM COATED, EXTENDED RELEASE ORAL at 07:00

## 2025-08-13 RX ADMIN — ACETAMINOPHEN 975 MG: 325 TABLET ORAL at 20:07

## 2025-08-13 RX ADMIN — IBUPROFEN 600 MG: 600 TABLET ORAL at 14:51

## 2025-08-13 RX ADMIN — IRON SUCROSE 200 MG: 20 INJECTION, SOLUTION INTRAVENOUS at 18:44

## 2025-08-13 RX ADMIN — PRENATAL VIT W/ FE FUMARATE-FA TAB 27-0.8 MG 1 TABLET: 27-0.8 TAB at 09:16

## 2025-08-13 RX ADMIN — FAMOTIDINE 20 MG: 20 TABLET, FILM COATED ORAL at 20:07

## 2025-08-13 RX ADMIN — MAGNESIUM SULFATE HEPTAHYDRATE 2 G/HR: 40 INJECTION, SOLUTION INTRAVENOUS at 04:06

## 2025-08-13 ASSESSMENT — PAIN SCALES - GENERAL
PAINLEVEL_OUTOF10: 5 - MODERATE PAIN
PAINLEVEL_OUTOF10: 1
PAINLEVEL_OUTOF10: 0 - NO PAIN

## 2025-08-13 ASSESSMENT — PAIN - FUNCTIONAL ASSESSMENT
PAIN_FUNCTIONAL_ASSESSMENT: 0-10
PAIN_FUNCTIONAL_ASSESSMENT: 0-10

## 2025-08-13 NOTE — DISCHARGE INSTRUCTIONS

## 2025-08-13 NOTE — SIGNIFICANT EVENT
To bedside due to patient with O2 desaturation of 88% while sleeping per RN. On exam patient is awake, current O2 sat of 95% on pulse ox. Patient denies any SOB or chest pain. Reports snoring while sleeping however has never been given diagnosis of sleep apnea in the past. Lungs CTAB. 2+ UE DTR. Low clinical suspicion for pulmonary edema at this time given exam findings. Will obtain CXR and continue to monitor. Continue Mag 2g/hr.    Discussed with Dr. Paola Robledo MD PGY-4

## 2025-08-13 NOTE — PROGRESS NOTES
Postpartum Progress Note    Assessment/Plan   Cass Ngo is a 40 y.o., , who delivered at 33w5d gestation via a primary , Low Transverse complicated by PPH with a total EBL of 1440 s/p TXA, pit bolus, rectal cytotec, and D&C in the setting of NRFHT and sPEC.     Now POD#1 s/p , Low Transverse on 2025  - Continue routine postpartum care  - Pain well controlled on po medications  - DVT risk score DVT Score (IF A SCORE IS NOT CALCULATING, MUST SELECT A BMI TO COMPLETE): 9 , ppx with SCDs, ambulation, and lovenox  - RH positive, rhogam not indicated  - Hgb:   Results from last 7 days   Lab Units 25  0643 25  1632 25  1207   HEMOGLOBIN g/dL 7.8* 8.8* 10.5*        Anemia most likely secondary to PPH  - Admission hgb 10.5, now 7.8   - pCS complicated by PPH with total EBL of 1400   - Denies any dizziness/lightheadedness, SOB, palpitations, extreme fatigue, or heavy bleeding   - Normotensive. afebrile, not tachycardic  - IV Venofer push ordered   - Recheck hgb tomorrow AM, CBC ordered  - Continue to monitor VS    sPEC   - Diagnosed by severe range BPs requiring IV treatment  - s/p Mag  - Current antihypertensive regimen: nifed 60/60  - HELLP labs wnl x2  - Was on 2L simple mask oxygen requirement overnight on  when sleeping  - Lungs clear and VSS  - Chest x-ray ordered overnight showed streaky opacities of the right lung base likely reflect atelectasis   - Low concern for acute cardiopulmonary process at this time  - Asymptomatic  - Normotensive   - Continue to monitor BP      Maternal Well-Being  - Vitals stable  - All questions and concerns address    [place] Feeding  - Breastfeeding/pumping encouraged  - Lactation consult prn    Contraception  - Tubal ligation  - Education provided    Dispo  - Anticipate d/c on PPD #3 if meeting all postpartum milestones  - Follow-up in 2-5 days for BP check  - Follow-up in 1-2wks for incision check  - Follow-up in 4-6wks with  primary OGYN    Suzan Chapman MD   PGY-1 Obstetrics and Gynecology      Subjective     Cass Ngo is PPD#1 s/p  and BTL complicated by PPH now s/p TXA, pit bolus, and rectal cytotec, and D&C with total EBL of 1400. She reports feeling overall well. She denies any  dizziness/lightheadedness, SOB, palpitations, extreme fatigue,. She denies any headaches, vision changes, chest pain, or RUQ pain. She was on supplemental oxygen overnight, but she is currently on room air. She denies any shortness of breath or increased work of breathing. She reports she is feeling a lot better now off magnesium. She reports she has minimal vaginal bleeding and is not passing any large clots. She reports her pain is well controlled. She reports she is passing flatus and voiding. She is ambulating well. She is tolerating adult regular diet. She is pumping since her baby is in the NICU, and she is following with lactation services in the NICU for any questions or concerns.      Objective   Allergies:   Patient has no known allergies.         Last Vitals:  Temp Pulse Resp BP MAP Pulse Ox   36.4 °C (97.5 °F) 97 18 137/87   98 %     Vitals Min/Max Last 24 Hours:  Temp  Min: 36.1 °C (97 °F)  Max: 36.8 °C (98.2 °F)  Pulse  Min: 80  Max: 102  Resp  Min: 13  Max: 20  BP  Min: 110/58  Max: 137/87    Intake/Output:     Intake/Output Summary (Last 24 hours) at 2025 1449  Last data filed at 2025 0650  Gross per 24 hour   Intake 1396.3 ml   Output 5010 ml   Net -3613.7 ml       Physical Exam:  General: Examination reveals a well developed, well nourished, female, in no acute distress. She is alert and cooperative.  Lungs: symmetrical, non-labored breathing.  Cardiac: warm, well-perfused.  Abdomen: soft, non-tender.  Incision: Well approximated, without erythema/edema/drainage  Fundus: firm, below umbilicus, appropriately tender  Extremities: no redness or tenderness in the calves or thighs.  Neurological: alert, oriented,  normal speech, no focal findings or movement disorder noted.     Lab Data:  Lab Results   Component Value Date    ABO O 08/11/2025    LABRH POS 08/11/2025    ABSCRN NEG 08/11/2025     Lab Results   Component Value Date    WBC 11.3 08/13/2025    HGB 7.8 (L) 08/13/2025    HCT 23.4 (L) 08/13/2025     08/13/2025     Lab Results   Component Value Date    GLUCOSE 90 08/11/2025     08/11/2025    K 3.9 08/11/2025     08/11/2025    CO2 23 08/11/2025    ANIONGAP 13 08/11/2025    BUN 10 08/11/2025    CREATININE 0.59 08/11/2025    EGFR >90 08/11/2025    CALCIUM 8.7 08/11/2025    ALBUMIN 3.4 08/11/2025    PROT 5.7 (L) 08/11/2025    ALKPHOS 113 (H) 08/11/2025    ALT 14 08/11/2025    AST 17 08/11/2025    BILITOT 0.5 08/11/2025     0   Lab Value Date/Time    UTPCR 0.24 (H) 08/08/2025 1156    UTPCR 132 07/29/2025 1123    UTPCR 0.132 07/29/2025 1123     Lab Results   Component Value Date    APTT 27 08/12/2025    PROTIME 10.4 08/12/2025    INR 0.9 08/12/2025     Lab Results   Component Value Date    FIBRINOGEN 237 08/12/2025

## 2025-08-13 NOTE — SIGNIFICANT EVENT
At bedside to evaluate patient.   Late entry secondary to patient care.    Pt with 500cc clot expulsion ~2 hours ago. Continues to have clot expulsion, though only 50-200cc. At bedside to evaluate. She reports that her main complaint right now is nausea. She is also have some pain and reports this to be in her lower abdomen and feels like soreness surrounding her  incision. She denies any cramping pains or lightheadedness/dizziness. Mann any SOB or chest pain.     Exam:   General - sitting up in bed, rocking back in forth clutching her abdomen   Abdomen-postpartum with uterine fundus at/slightly below the level of the umbilicus and mildly deviated to patient right, fundus feels firm  Pelvic-normal-appearing amount of postpartum vaginal bleeding.  No large clots or active hemorrhage.    Bedside ultrasound-uterine fundus appears slightly deviated to the right with thickened endometrial stripe in the midportion of the uterus with fundal stripe thin.  Heterogenous appearing material, though not hyperechoic, consistent with large blood clot    Plan:  - Give patient IV antiemetics at this time  - Will obtain second IV and labs including CBC, fibrinogen, coags, and Mag level   - Discussed with patient the need to evacuate the contents of the uterus to control bleeding.  We discussed this could occur with a bedside exam, however given her increase in pain I am concerned she will not tolerate the exam and all of the clot will be evacuated.  I suggested proceeding to the OR for D&C.  The patient is amenable.  The patient was verbally consented for D&C with increased risk of bleeding, need for uterine tamponade/suction device, infection, hysterectomy.  - IV Dilaudid x 1 now for pain control  - Continue to OR for D&C under ultrasound guidance.    Gloria Sood MD

## 2025-08-13 NOTE — LACTATION NOTE
This note was copied from a baby's chart.  Lactation Consultant Note  Lactation Consultation       Maternal Information       Maternal Assessment       Infant Assessment       Feeding Assessment       LATCH TOOL       Breast Pump       Other OB Lactation Tools       Patient Follow-up       Other OB Lactation Documentation       Recommendations/Summary  Explained availability of Lactation Consult services. Reviewed listed patient instruction material, use of symphony electric breast pump and CDC pump cleaning and sanitizing guidelines.   Mom states she  her now 2yr old from a little over a year. She was not on a good pumping schedule yesterday, but states she is getting better with it. Encouraged mom to pump every 2-3 hrs, both breasts for 15-20 min. Mom states [facility] will fax info to verify if mom is eligible for a new breast pump. Offered [facility] kelechi Symphony breastpump for home use while infant remains hospitalized. Terms of rental agreement reviewed.  Invited to contact Lactation Consult services as needed.

## 2025-08-13 NOTE — LACTATION NOTE
Lactation Consultant Note  Lactation Consultation  Reason for Consult: Initial assessment, NICU baby  Consultant Name: Shikha Arteaga RN IBCLC    Maternal Information  Has mother  before?: Yes  How long did the mother previously breastfeed?: over a year (combination of latching/pumping) with all of her other children  Previous Maternal Breastfeeding Challenges:  (mastitis/thrush with her youngest child)    Maternal Assessment  Breast Assessment: Medium, Symmetrical, Large  Nipple Assessment: Intact, Erect  [place] Assessment: Normal    Infant Assessment  Infant Behavior:  (infant in NICU)    Feeding Assessment  Nutrition Source:  (per NICU)    LATCH TOOL       Breast Pump  Pump: Hospital grade electric pump, Double breast pumping  Duration: Initiate phase  Breast Shield Size and Type: 24 mm (recommended she try the size 21mm flanges based on a 19mm nipple measurement bilat)  Volume of Milk Production: 6  Units of Volume: mL per session    Other OB Lactation Tools  Lactation Tools: Flanges    Patient Follow-up  Outpatient Lactation Follow-up: Recommended    Other OB Lactation Documentation  Maternal Risk Factors: Age over 30, primiparity, Preeclampsia, Significant hemorrhage,  delivery, Extreme tiredness, fatigue or stress,  delivery <37 weeks  Infant Risk Factors: Low birth weight <2500 g, Prematurity <37 weeks, Infant Apgar <8    Recommendations/Summary  Rounded on mother to discuss breastfeeding/pumping. Mother has already been set up pumping using the symphony pump at bedside. Mother reported she produced 6ml with her most recent pumping session and has been using the size 24mm flanges. I reviewed with mother the use of symphony pump initiate program and cleaning and care of pump parts. We discussed the benefits of breast massage prior to pumping, expectations of volume with pumping in the first few days,and milk storage guidelines. I provided mother with PI sheet 123 and Care and Cleaning of  Pump Parts handout for her reference. Encouraged her to pump every 2-3 hours (8-12 times in a 24 hour period) to best promote the production of a full milk supply. I measured mother's nipples at 19mm bilat and recommended she try using the size 21mm flanges. I educated mother on correct flange fit and if she feels the 19mm are too small/tight she can go back to using the size 24mm. Mother has a breast pump at home from her last pregnancy but would need to purchase new parts for it. As that baby is over 2 years old mother requested I try to submit a pump order through her insurance and would like a Spectra pump. (Requested via [employer])    I provided mother with outpatient lactation resources available to her. I also informed her of the availability of NICU lactation and the option of pumping at infant's bedside. Encouraged her to call for any questions/assistance as needed.

## 2025-08-14 LAB
ABO GROUP (TYPE) IN BLOOD: NORMAL
ANTIBODY SCREEN: NORMAL
BLOOD EXPIRATION DATE: NORMAL
BLOOD EXPIRATION DATE: NORMAL
DISPENSE STATUS: NORMAL
DISPENSE STATUS: NORMAL
ERYTHROCYTE [DISTWIDTH] IN BLOOD BY AUTOMATED COUNT: 13.6 % (ref 11.5–14.5)
ERYTHROCYTE [DISTWIDTH] IN BLOOD BY AUTOMATED COUNT: 14.7 % (ref 11.5–14.5)
HCT VFR BLD AUTO: 19.9 % (ref 36–46)
HCT VFR BLD AUTO: 23.9 % (ref 36–46)
HGB BLD-MCNC: 6.5 G/DL (ref 12–16)
HGB BLD-MCNC: 7.7 G/DL (ref 12–16)
MCH RBC QN AUTO: 28 PG (ref 26–34)
MCH RBC QN AUTO: 28.3 PG (ref 26–34)
MCHC RBC AUTO-ENTMCNC: 32.2 G/DL (ref 32–36)
MCHC RBC AUTO-ENTMCNC: 32.7 G/DL (ref 32–36)
MCV RBC AUTO: 87 FL (ref 80–100)
MCV RBC AUTO: 87 FL (ref 80–100)
NRBC BLD-RTO: 0 /100 WBCS (ref 0–0)
NRBC BLD-RTO: 0.2 /100 WBCS (ref 0–0)
PLATELET # BLD AUTO: 166 X10*3/UL (ref 150–450)
PLATELET # BLD AUTO: 169 X10*3/UL (ref 150–450)
PRODUCT BLOOD TYPE: 5100
PRODUCT BLOOD TYPE: 5100
PRODUCT CODE: NORMAL
PRODUCT CODE: NORMAL
RBC # BLD AUTO: 2.3 X10*6/UL (ref 4–5.2)
RBC # BLD AUTO: 2.75 X10*6/UL (ref 4–5.2)
RH FACTOR (ANTIGEN D): NORMAL
UNIT ABO: NORMAL
UNIT ABO: NORMAL
UNIT NUMBER: NORMAL
UNIT NUMBER: NORMAL
UNIT RH: NORMAL
UNIT RH: NORMAL
UNIT VOLUME: 325
UNIT VOLUME: 350
WBC # BLD AUTO: 8.3 X10*3/UL (ref 4.4–11.3)
WBC # BLD AUTO: 9.4 X10*3/UL (ref 4.4–11.3)
XM INTEP: NORMAL
XM INTEP: NORMAL

## 2025-08-14 PROCEDURE — 36415 COLL VENOUS BLD VENIPUNCTURE: CPT

## 2025-08-14 PROCEDURE — 85027 COMPLETE CBC AUTOMATED: CPT

## 2025-08-14 PROCEDURE — 2500000002 HC RX 250 W HCPCS SELF ADMINISTERED DRUGS (ALT 637 FOR MEDICARE OP, ALT 636 FOR OP/ED)

## 2025-08-14 PROCEDURE — 2500000004 HC RX 250 GENERAL PHARMACY W/ HCPCS (ALT 636 FOR OP/ED)

## 2025-08-14 PROCEDURE — 2500000001 HC RX 250 WO HCPCS SELF ADMINISTERED DRUGS (ALT 637 FOR MEDICARE OP)

## 2025-08-14 PROCEDURE — 1210000001 HC SEMI-PRIVATE ROOM DAILY

## 2025-08-14 PROCEDURE — 86901 BLOOD TYPING SEROLOGIC RH(D): CPT

## 2025-08-14 PROCEDURE — P9016 RBC LEUKOCYTES REDUCED: HCPCS

## 2025-08-14 PROCEDURE — 36430 TRANSFUSION BLD/BLD COMPNT: CPT

## 2025-08-14 RX ORDER — LABETALOL 200 MG/1
200 TABLET, FILM COATED ORAL 2 TIMES DAILY
Status: DISCONTINUED | OUTPATIENT
Start: 2025-08-14 | End: 2025-08-15

## 2025-08-14 RX ADMIN — IBUPROFEN 600 MG: 600 TABLET ORAL at 03:19

## 2025-08-14 RX ADMIN — ACETAMINOPHEN 975 MG: 325 TABLET ORAL at 21:12

## 2025-08-14 RX ADMIN — PRENATAL VIT W/ FE FUMARATE-FA TAB 27-0.8 MG 1 TABLET: 27-0.8 TAB at 09:21

## 2025-08-14 RX ADMIN — IBUPROFEN 600 MG: 600 TABLET ORAL at 09:20

## 2025-08-14 RX ADMIN — ACETAMINOPHEN 975 MG: 325 TABLET ORAL at 03:19

## 2025-08-14 RX ADMIN — NIFEDIPINE 60 MG: 60 TABLET, FILM COATED, EXTENDED RELEASE ORAL at 18:54

## 2025-08-14 RX ADMIN — FAMOTIDINE 20 MG: 20 TABLET, FILM COATED ORAL at 21:12

## 2025-08-14 RX ADMIN — IBUPROFEN 600 MG: 600 TABLET ORAL at 15:25

## 2025-08-14 RX ADMIN — IBUPROFEN 600 MG: 600 TABLET ORAL at 21:12

## 2025-08-14 RX ADMIN — ACETAMINOPHEN 975 MG: 325 TABLET ORAL at 15:25

## 2025-08-14 RX ADMIN — FAMOTIDINE 20 MG: 20 TABLET, FILM COATED ORAL at 09:20

## 2025-08-14 RX ADMIN — LABETALOL HYDROCHLORIDE 200 MG: 200 TABLET, FILM COATED ORAL at 16:10

## 2025-08-14 RX ADMIN — ACETAMINOPHEN 975 MG: 325 TABLET ORAL at 09:20

## 2025-08-14 RX ADMIN — NIFEDIPINE 60 MG: 60 TABLET, FILM COATED, EXTENDED RELEASE ORAL at 06:55

## 2025-08-14 RX ADMIN — LABETALOL HYDROCHLORIDE 20 MG: 5 INJECTION INTRAVENOUS at 14:39

## 2025-08-14 ASSESSMENT — PAIN DESCRIPTION - DESCRIPTORS: DESCRIPTORS: BURNING

## 2025-08-14 ASSESSMENT — PAIN SCALES - GENERAL
PAINLEVEL_OUTOF10: 0 - NO PAIN
PAINLEVEL_OUTOF10: 0 - NO PAIN
PAINLEVEL_OUTOF10: 3
PAINLEVEL_OUTOF10: 0 - NO PAIN
PAINLEVEL_OUTOF10: 3
PAINLEVEL_OUTOF10: 0 - NO PAIN

## 2025-08-14 ASSESSMENT — PAIN - FUNCTIONAL ASSESSMENT: PAIN_FUNCTIONAL_ASSESSMENT: 0-10

## 2025-08-14 NOTE — PROGRESS NOTES
08/14/25 1033   Discharge Planning   Home or Post Acute Services   (Blood Pressure Monitor)     Patient meets criteria for home monitoring of blood pressure post discharge.  Reason: current preeclampsia with severe features. Met with patient to assess for availability of home BP monitor.  Patient stated she owns home BP monitor.  Patient educated on importance of continuing to monitor BP at home, recording BP on home monitoring log and s/sx of when to call her provider.  Pt verbalized understanding the above information.

## 2025-08-14 NOTE — SIGNIFICANT EVENT
Called by RN, pt. With 2 severe bp's  15min apart.  POD#2 PCS  Pt. Awake and alert, denies any ha, cp, sob, vision changes.  2nd unit of prbcs infusing now for hgb 6.5 this a.m.  Discussed with Dr. Guevara  Labetalol 20mgIV now and transfer to L&D for monitoring.

## 2025-08-14 NOTE — SIGNIFICANT EVENT
Patient POD2 from [facility] in s/o sPEC, transferred to Mac 2 for sustained SRBP requiring IV treatment with labetalol 20mg.     Denies HA, vision changes, CP, SOB, RUQ/epigastric pain.    Vitals:    08/14/25 1701   BP:    Pulse: 85   Resp:    Temp:    SpO2: 97%     General: Lying in bed in NAD  Neuro: Awake, alert, conversational  CV: warm and well perfused  Respiratory: Even and unlabored on RA  Extremities: Full ROM  Psych: appropriate mood and affect    A/p:  41 yo now POD2 from [facility] in s/o sPEC    sPEC   - Diagnosed by severe range BPs requiring IV treatment  - s/p 24h PP Mag  - s/p IV labetalol 20 mg prior to transfer to Mac 2  - Current antihypertensive regimen: nifed 60/60. Previously on labetalol 200 BID held for the last 2 days d/t normotensive pressures. Will restart  - HELLP labs wnl x2     For monitoring on Mac 2 for 4 hours post IV treatment    To be Discussed with Dr. Indigo Morin MD, PGY-3

## 2025-08-14 NOTE — LACTATION NOTE
Lactation Consultant Note  Recommendations/Summary  Rounded on mother to discuss breastfeeding/pumping. Mother reported pumping has been comfortable and she has been producing about 1 ml with her pumping sessions. Mother verbalized some discouragement that she is not producing more amounts for infant and I provided mother with reassurance and emotional support. We discussed mother's risk factors for a delayed increase in her milk production and we discussed that with consistent and frequent breast stimulation her milk supply should continue to increase. Mother stated she has been using both the maintain and initiate settings on the symphony pump. I explained the differences in the settings and the rationale for using the initiate program in the early days and we discussed a recommended pumping length of 15-20 minutes. Mother denied further questions or concerns at this time. Encouraged to call for assistance as needed.

## 2025-08-14 NOTE — CARE PLAN
Problem: Hypertensive Disorder of Pregnancy (HDP)  Goal: Minimal s/sx of HDP and BP<160/110  Outcome: Adequate for Discharge     Problem: Pain - Adult  Goal: Verbalizes/displays adequate comfort level or baseline comfort level  Outcome: Adequate for Discharge     The patient's goals for the shift include visit baby in nicu    The clinical goals for the shift include VSS, bleeding and swelling minimal, and pain controlled with medications.    Over the shift, the patient made progress to the goals above with stable vital signs post IV blood pressure treatment.

## 2025-08-14 NOTE — PROGRESS NOTES
Postpartum Progress Note    Assessment/Plan   Cass Ngo is a 40 y.o., , who delivered at 33w5d gestation via a primary , Low Transverse complicated by PPH with a total EBL of 1440 s/p TXA, pit bolus, rectal cytotec, and D&C in the setting of NRFHT and sPEC.     Now POD#3 s/p , Low Transverse on 2025  - Continue routine postpartum care  - Pain well controlled on po medications  - DVT risk score DVT Score (IF A SCORE IS NOT CALCULATING, MUST SELECT A BMI TO COMPLETE): 9 , ppx with SCDs, ambulation, and lovenox  - RH positive, rhogam not indicated  - Hgb:   Results from last 7 days   Lab Units 25  0654 25  0643 25  1632   HEMOGLOBIN g/dL 6.5* 7.8* 8.8*        Anemia most likely secondary to PPH  - Admission hgb 10.5, downtrending, now 6.5  - Received IV iron push yesterday  - pCS complicated by PPH with total EBL of 1400   - Denies any dizziness/lightheadedness, SOB, palpitations, extreme fatigue, or heavy bleeding   - Had one mild range blood pressure overnight otherwise normotensive. afebrile, not tachycardic  - Transfuse 2 Units of RBC  - Plan to recheck CBC after transfusion  - repeat CBC ordered for tomorrow morning   - Continue to monitor VS    sPEC   - Diagnosed by severe range BPs requiring IV treatment  - s/p Mag  - Current antihypertensive regimen: nifed 60/60  - HELLP labs wnl x2  - Not requiring oxygen overnight was satting between 93-98 on room air  - Had one mild range blood pressure overnight otherwise normotensive  - Denies PEC symptoms   - Continue to monitor BP      Maternal Well-Being  - Vital discussed in care plan above  - All questions and concerns addressed    [place] Feeding  - Breastfeeding/pumping encouraged  - Lactation consult prn    Contraception  - Tubal ligation  - Education provided    Dispo  - Anticipate d/c on PPD #3 if meeting all postpartum milestones  - Follow-up in 2-5 days for BP check  - Follow-up in 1-2wks for incision  "check  - Follow-up in 4-6wks with primary KOKO Chapman MD   PGY-1 Obstetrics and Gynecology      Subjective     Cass Ngo is POD#3 s/p  and BTL complicated by PPH now s/p TXA, pit bolus, and rectal cytotec, and D&C with total EBL of 1400. She reports feeling overall well. She denies any  dizziness/lightheadedness, SOB, palpitations, extreme fatigue,. She denies any headaches, vision changes, chest pain, or RUQ pain. She reports her pain is well controlled and her vaginal bleeding is minimal, not passing any large clots. She is voiding, had a BM, and tolerating regular diet. She is pumping, and has connected with lactation in the NICU and on the floor. Her mood is appropriate.    Objective   Allergies:   Patient has no known allergies.         Last Vitals:  Temp Pulse Resp BP MAP Pulse Ox   36.5 °C (97.7 °F) 100 16 131/82   97 %     Vitals Min/Max Last 24 Hours:  Temp  Min: 36.3 °C (97.3 °F)  Max: 37 °C (98.6 °F)  Pulse  Min: 97  Max: 107  Resp  Min: 16  Max: 20  BP  Min: 118/76  Max: 143/77    Intake/Output:   No intake or output data in the 24 hours ending 25 0948      Physical Exam:  General: Examination reveals a well developed, well nourished, female, in no acute distress. She is alert and cooperative.  Lungs: symmetrical, non-labored breathing.  Cardiac: warm, well-perfused.  Abdomen: soft, non-tender.  Incision: Well approximated, without erythema/edema/drainage  Fundus: firm, below umbilicus, appropriately tender  Extremities: no redness or tenderness in the calves or thighs.  Neurological: alert, oriented, normal speech, no focal findings or movement disorder noted.     Lab Data:  No results found for: \"ABO\", \"LABRH\", \"ABSCRN\"    Lab Results   Component Value Date    WBC 8.3 2025    HGB 6.5 (LL) 2025    HCT 19.9 (L) 2025     2025     No results found for: \"GLUCOSE\", \"NA\", \"K\", \"CL\", \"CO2\", \"ANIONGAP\", \"BUN\", \"CREATININE\", \"EGFR\", \"CALCIUM\", " "\"ALBUMIN\", \"PROT\", \"ALKPHOS\", \"ALT\", \"AST\", \"BILITOT\"    0   Lab Value Date/Time    UTPCR 0.24 (H) 08/08/2025 1156    UTPCR 132 07/29/2025 1123    UTPCR 0.132 07/29/2025 1123     Lab Results   Component Value Date    APTT 27 08/12/2025    PROTIME 10.4 08/12/2025    INR 0.9 08/12/2025     Lab Results   Component Value Date    FIBRINOGEN 237 08/12/2025       "

## 2025-08-15 ENCOUNTER — DOCUMENTATION (OUTPATIENT)
Dept: OBSTETRICS AND GYNECOLOGY | Facility: HOSPITAL | Age: 40
End: 2025-08-15

## 2025-08-15 ENCOUNTER — PHARMACY VISIT (OUTPATIENT)
Dept: PHARMACY | Facility: CLINIC | Age: 40
End: 2025-08-15
Payer: COMMERCIAL

## 2025-08-15 ENCOUNTER — DOCUMENTATION (OUTPATIENT)
Dept: OBSTETRICS AND GYNECOLOGY | Facility: HOSPITAL | Age: 40
End: 2025-08-15
Payer: COMMERCIAL

## 2025-08-15 VITALS
WEIGHT: 208.34 LBS | HEART RATE: 97 BPM | TEMPERATURE: 98.1 F | HEIGHT: 66 IN | RESPIRATION RATE: 17 BRPM | BODY MASS INDEX: 33.48 KG/M2 | OXYGEN SATURATION: 97 % | SYSTOLIC BLOOD PRESSURE: 120 MMHG | DIASTOLIC BLOOD PRESSURE: 76 MMHG

## 2025-08-15 LAB
BLOOD EXPIRATION DATE: NORMAL
DISPENSE STATUS: NORMAL
ERYTHROCYTE [DISTWIDTH] IN BLOOD BY AUTOMATED COUNT: 14.7 % (ref 11.5–14.5)
HCT VFR BLD AUTO: 25.5 % (ref 36–46)
HGB BLD-MCNC: 8.3 G/DL (ref 12–16)
MCH RBC QN AUTO: 28.5 PG (ref 26–34)
MCHC RBC AUTO-ENTMCNC: 32.5 G/DL (ref 32–36)
MCV RBC AUTO: 88 FL (ref 80–100)
NRBC BLD-RTO: 0 /100 WBCS (ref 0–0)
PLATELET # BLD AUTO: 179 X10*3/UL (ref 150–450)
PRODUCT BLOOD TYPE: NORMAL
PRODUCT CODE: NORMAL
RBC # BLD AUTO: 2.91 X10*6/UL (ref 4–5.2)
UNIT ABO: NORMAL
UNIT NUMBER: NORMAL
UNIT RH: NORMAL
UNIT VOLUME: 350
WBC # BLD AUTO: 9.2 X10*3/UL (ref 4.4–11.3)
XM INTEP: NORMAL

## 2025-08-15 PROCEDURE — 36415 COLL VENOUS BLD VENIPUNCTURE: CPT

## 2025-08-15 PROCEDURE — 85027 COMPLETE CBC AUTOMATED: CPT

## 2025-08-15 PROCEDURE — 99239 HOSP IP/OBS DSCHRG MGMT >30: CPT

## 2025-08-15 PROCEDURE — 2500000001 HC RX 250 WO HCPCS SELF ADMINISTERED DRUGS (ALT 637 FOR MEDICARE OP)

## 2025-08-15 PROCEDURE — 2500000002 HC RX 250 W HCPCS SELF ADMINISTERED DRUGS (ALT 637 FOR MEDICARE OP, ALT 636 FOR OP/ED)

## 2025-08-15 PROCEDURE — RXMED WILLOW AMBULATORY MEDICATION CHARGE

## 2025-08-15 RX ORDER — NIFEDIPINE 60 MG/1
60 TABLET, FILM COATED, EXTENDED RELEASE ORAL EVERY 12 HOURS
Qty: 80 TABLET | Refills: 0 | Status: SHIPPED | OUTPATIENT
Start: 2025-08-15

## 2025-08-15 RX ORDER — POLYETHYLENE GLYCOL 3350 17 G/17G
17 POWDER, FOR SOLUTION ORAL 2 TIMES DAILY PRN
Qty: 238 G | Refills: 0 | Status: SHIPPED | OUTPATIENT
Start: 2025-08-15

## 2025-08-15 RX ORDER — DIPHENHYDRAMINE HYDROCHLORIDE 50 MG/ML
25 INJECTION, SOLUTION INTRAMUSCULAR; INTRAVENOUS ONCE
Status: DISCONTINUED | OUTPATIENT
Start: 2025-08-15 | End: 2025-08-15

## 2025-08-15 RX ORDER — LABETALOL 200 MG/1
200 TABLET, FILM COATED ORAL ONCE
Status: COMPLETED | OUTPATIENT
Start: 2025-08-15 | End: 2025-08-15

## 2025-08-15 RX ORDER — IBUPROFEN 600 MG/1
600 TABLET, FILM COATED ORAL EVERY 6 HOURS PRN
Qty: 60 TABLET | Refills: 0 | Status: SHIPPED | OUTPATIENT
Start: 2025-08-15

## 2025-08-15 RX ORDER — LABETALOL 200 MG/1
400 TABLET, FILM COATED ORAL 2 TIMES DAILY
Qty: 160 TABLET | Refills: 0 | Status: SHIPPED | OUTPATIENT
Start: 2025-08-15 | End: 2025-08-19 | Stop reason: ALTCHOICE

## 2025-08-15 RX ORDER — FERROUS SULFATE 325(65) MG
325 TABLET ORAL EVERY OTHER DAY
Qty: 30 TABLET | Refills: 0 | Status: SHIPPED | OUTPATIENT
Start: 2025-08-15 | End: 2025-10-14

## 2025-08-15 RX ORDER — LABETALOL 200 MG/1
600 TABLET, FILM COATED ORAL 2 TIMES DAILY
Status: DISCONTINUED | OUTPATIENT
Start: 2025-08-15 | End: 2025-08-15

## 2025-08-15 RX ORDER — ACETAMINOPHEN 325 MG/1
975 TABLET ORAL EVERY 6 HOURS PRN
Qty: 120 TABLET | Refills: 0 | Status: SHIPPED | OUTPATIENT
Start: 2025-08-15

## 2025-08-15 RX ORDER — LABETALOL 200 MG/1
400 TABLET, FILM COATED ORAL 2 TIMES DAILY
Status: DISCONTINUED | OUTPATIENT
Start: 2025-08-15 | End: 2025-08-15 | Stop reason: HOSPADM

## 2025-08-15 RX ORDER — NIFEDIPINE 10 MG/1
10 CAPSULE ORAL ONCE AS NEEDED
Status: COMPLETED | OUTPATIENT
Start: 2025-08-15 | End: 2025-08-15

## 2025-08-15 RX ORDER — LABETALOL HYDROCHLORIDE 5 MG/ML
20 INJECTION, SOLUTION INTRAVENOUS ONCE
Status: DISCONTINUED | OUTPATIENT
Start: 2025-08-15 | End: 2025-08-15 | Stop reason: HOSPADM

## 2025-08-15 RX ORDER — LABETALOL 200 MG/1
400 TABLET, FILM COATED ORAL 2 TIMES DAILY
Status: DISCONTINUED | OUTPATIENT
Start: 2025-08-15 | End: 2025-08-15

## 2025-08-15 RX ADMIN — NIFEDIPINE 60 MG: 60 TABLET, FILM COATED, EXTENDED RELEASE ORAL at 07:19

## 2025-08-15 RX ADMIN — IBUPROFEN 600 MG: 600 TABLET ORAL at 09:27

## 2025-08-15 RX ADMIN — LABETALOL HYDROCHLORIDE 400 MG: 200 TABLET, FILM COATED ORAL at 09:28

## 2025-08-15 RX ADMIN — ACETAMINOPHEN 975 MG: 325 TABLET ORAL at 03:29

## 2025-08-15 RX ADMIN — ACETAMINOPHEN 975 MG: 325 TABLET ORAL at 09:27

## 2025-08-15 RX ADMIN — NIFEDIPINE 10 MG: 10 CAPSULE ORAL at 01:17

## 2025-08-15 RX ADMIN — IBUPROFEN 600 MG: 600 TABLET ORAL at 03:29

## 2025-08-15 RX ADMIN — LABETALOL HYDROCHLORIDE 200 MG: 200 TABLET, FILM COATED ORAL at 00:57

## 2025-08-15 RX ADMIN — PRENATAL VIT W/ FE FUMARATE-FA TAB 27-0.8 MG 1 TABLET: 27-0.8 TAB at 09:27

## 2025-08-15 RX ADMIN — FAMOTIDINE 20 MG: 20 TABLET, FILM COATED ORAL at 09:27

## 2025-08-15 ASSESSMENT — PAIN SCALES - GENERAL
PAINLEVEL_OUTOF10: 0 - NO PAIN
PAINLEVEL_OUTOF10: 2

## 2025-08-15 ASSESSMENT — PAIN DESCRIPTION - DESCRIPTORS: DESCRIPTORS: TENDER;BURNING

## 2025-08-15 NOTE — CARE PLAN
The patient's goals for the shift include rest & bond with baby    The clinical goals for the shift include Maintain Pain level below 4 on scale of 1-10    Vital signs stable throughout the shift, lochia has been WNL, patient is without s/s of HDP. Patient is ready for discharge.

## 2025-08-15 NOTE — SIGNIFICANT EVENT
To bedside to evaluate patient after being sent down for monitoring following sustained severe range BP requiring treatment.     She received 10mg IR nifedipine d/t loss of IV access, and has been normotensive since. Tolerated the nifedipine well.     Denies any HA, CP, SOB, RUQ pain, LE edema.     O:  Visit Vitals  /66 Comment: #1 q30 min BP recheck   Pulse 88   Temp 36 °C (96.8 °F) (Temporal)   Resp 18      General: no acute distress  HEENT: normocephalic, atraumatic  Heart: warm and well perfused, RRR  Lungs: no increased WOB, CTAB across all lung fields  Abdomen: soft, nontender to palpation  Extremities: moving all extremities, no LE edema  Neuro: awake and conversant  Psych: appropriate mood and affect    A/P:  sPEC  -normotensive following IR nifedipine 10mg  -asymptomatic  -for 4 hours of monitoring on MAC 2  -BP regimen titrated to nifedipine 60 bid, labetalol 400 bid    Yovana Arora MD PGY1

## 2025-08-15 NOTE — DISCHARGE SUMMARY
Discharge Summary    Cass Ngo is a 40 y.o. year old female , who delivered at 33w5d gestation via a primary , Low Transverse complicated by PPH with a total EBL of 1440 s/p TXA, pit bolus, rectal cytotec, and D&C in the setting of NRFHT and sPEC, now PPD#3.    Admission Date: 2025  Discharge Date: 08/15/25       Discharge Diagnosis  , Low Transverse    Hospital Course  Delivery Date: 2025 5:57 AM  Delivery type: , Low Transverse   GA at delivery: 33w5d   Outcome: Living  Intrapartum complications: Fetal Intolerance  Feeding method: Breastfeeding Status: Yes     Anemia most likely secondary to PPH  - pCS complicated by PPH with total EBL of 1400   - Hgb 11.9 prior to admission-> 10.5 6 hrs after delivery-> 8.8 POD0-> 7.8 POD1-> 6.5 POD2-> 2 PRBC-> 7.7 POD2-> 8.3 POD3  - Received IV iron push  - Denies any dizziness/lightheadedness, SOB, palpitations, extreme fatigue, or heavy bleeding     sPEC   - Diagnosed by severe range BPs requiring IV treatment  - s/p Mag  - Current antihypertensive regimen: nifed 60/60+ labetalol 400 mg BID  - HELLP labs wnl x2  - Denies PEC symptoms   - Last severe range BPs requiring IV tx @ 0054 today  - Patient strongly desires discharge today, feels very anxious being hospitalized  - The signs and symptoms of PEC were reviewed with the patient, including unrelenting headache, vision changes/blurred vision, and pain underneath the right breast.   - BP cuff for home for checking BP BID. Pt instructed to call primary OB if SBP > 160 or DBP > 110.     Procedures: tubal ligation  Contraception at discharge: s/p BTL. We discussed pregnancy spacing of at least one year, abstaining from intercourse for 6wks, and the ability to become pregnant in the absence of regular menses. Pt verbalized understanding.      Meeting all postpartum milestones. OK for DC today and follow up as below.   - Follow-up in 2-5 days for BP check  - Follow-up in 1-2wks for  "incision check  - Follow-up in 4-6wks with primary OGYN    Pertinent Physical Exam At Time of Discharge    General: Examination reveals a well developed, well nourished, female, in no acute distress. She is alert and cooperative.  Lungs: symmetrical, non-labored breathing.  Cardiac: warm, well-perfused.  Abdomen: soft, non-tender.  Incision: Well approximated, without erythema/edema/drainage  Fundus: firm, below umbilicus, appropriately tender  Extremities: no redness or tenderness in the calves or thighs.  Neurological: alert, oriented, normal speech, no focal findings or movement disorder noted.     Vitals  /76   Pulse 97   Temp 36.7 °C (98.1 °F) (Axillary)   Resp 17   Ht 1.676 m (5' 5.98\")   Wt 94.5 kg (208 lb 5.4 oz)   LMP 12/19/2024   SpO2 97%   Breastfeeding Yes   BMI 33.64 kg/m²      Discharge Meds     Your medication list        START taking these medications        Instructions Last Dose Given Next Dose Due   ferrous sulfate 325 mg (65 mg elemental) tablet  Commonly known as: Iron (ferrous sulfate)      Take 1 tablet by mouth every other day for 30 doses. Take on an empty stomach with vitamin C supplement or vitamin C containing juice       ibuprofen 600 mg tablet      Take 1 tablet (600 mg) by mouth every 6 hours if needed for mild pain (1 - 3) or moderate pain (4 - 6).       labetalol 200 mg tablet  Commonly known as: Normodyne      Take 2 tablets (400 mg) by mouth 2 times a day.       NIFEdipine ER 60 mg 24 hr tablet  Commonly known as: Adalat CC      Take 1 tablet (60 mg) by mouth every 12 hours. Do not crush, chew, or split.       polyethylene glycol 17 gram/dose powder  Commonly known as: Glycolax, Miralax      Mix 17 g of powder and drink 2 times a day as needed for constipation.              CHANGE how you take these medications        Instructions Last Dose Given Next Dose Due   acetaminophen 325 mg tablet  Commonly known as: Tylenol  What changed:   medication strength  how much to " take  reasons to take this      Take 3 tablets (975 mg) by mouth every 6 hours if needed for mild pain (1 - 3) or moderate pain (4 - 6).              CONTINUE taking these medications        Instructions Last Dose Given Next Dose Due   albuterol 90 mcg/actuation inhaler           Claritin 10 mg tablet  Generic drug: loratadine           PRENATAL 19 ORAL                     Where to Get Your Medications        These medications were sent to [facility]  [address], [address], [facility] [address]      Hours: 8AM to 6PM Mon-Fri, 8AM to 4PM Sat, 9AM to 1PM Sun Phone: [phone]   acetaminophen 325 mg tablet  ferrous sulfate 325 mg (65 mg elemental) tablet  ibuprofen 600 mg tablet  labetalol 200 mg tablet  NIFEdipine ER 60 mg 24 hr tablet  polyethylene glycol 17 gram/dose powder          Complications Requiring Follow-Up  PEC w/ SF  PPH, ABLA    Test Results Pending At Discharge  Pending Labs       Order Current Status    Surgical Pathology Exam In process    Surgical Pathology Exam - PLACENTA In process    Surgical Pathology Exam - PRODUCTS OF CONCEPTION In process            Outpatient Follow-Up    I spent 35 minutes in the professional and overall care of this patient.      Rosa Frazier, APRN-CNP

## 2025-08-19 ENCOUNTER — POSTPARTUM VISIT (OUTPATIENT)
Dept: OBSTETRICS AND GYNECOLOGY | Facility: CLINIC | Age: 40
End: 2025-08-19
Payer: COMMERCIAL

## 2025-08-19 VITALS
WEIGHT: 190.4 LBS | DIASTOLIC BLOOD PRESSURE: 68 MMHG | SYSTOLIC BLOOD PRESSURE: 101 MMHG | BODY MASS INDEX: 30.75 KG/M2 | HEART RATE: 86 BPM

## 2025-08-19 DIAGNOSIS — Z09 POSTOPERATIVE EXAMINATION: Primary | ICD-10-CM

## 2025-08-19 PROBLEM — O35.BXX0 ABNORMAL FETAL ECHOCARDIOGRAM AFFECTING ANTEPARTUM CARE OF MOTHER (HHS-HCC): Status: RESOLVED | Noted: 2025-06-20 | Resolved: 2025-08-19

## 2025-08-19 PROBLEM — O14.10: Status: RESOLVED | Noted: 2025-08-08 | Resolved: 2025-08-19

## 2025-08-19 PROBLEM — Z3A.33 33 WEEKS GESTATION OF PREGNANCY (HHS-HCC): Status: RESOLVED | Noted: 2025-05-30 | Resolved: 2025-08-19

## 2025-08-19 PROBLEM — O28.9 ABNORMAL ANTENATAL TEST: Status: RESOLVED | Noted: 2025-03-26 | Resolved: 2025-08-19

## 2025-08-19 PROBLEM — O09.529 ANTEPARTUM MULTIGRAVIDA OF ADVANCED MATERNAL AGE (HHS-HCC): Status: RESOLVED | Noted: 2025-02-18 | Resolved: 2025-08-19

## 2025-08-19 PROBLEM — N83.201 RIGHT OVARIAN CYST: Status: RESOLVED | Noted: 2025-05-20 | Resolved: 2025-08-19

## 2025-08-19 PROBLEM — Z87.59 PREVIOUS BABY WITH FETAL GROWTH RESTRICTION: Status: RESOLVED | Noted: 2025-02-18 | Resolved: 2025-08-19

## 2025-08-19 PROBLEM — O14.13: Status: RESOLVED | Noted: 2025-08-08 | Resolved: 2025-08-19

## 2025-08-19 PROCEDURE — 96127 BRIEF EMOTIONAL/BEHAV ASSMT: CPT | Performed by: OBSTETRICS & GYNECOLOGY

## 2025-08-19 PROCEDURE — 99212 OFFICE O/P EST SF 10 MIN: CPT

## 2025-08-19 PROCEDURE — 0503F POSTPARTUM CARE VISIT: CPT

## 2025-08-19 ASSESSMENT — ENCOUNTER SYMPTOMS
NEUROLOGICAL NEGATIVE: 0
MUSCULOSKELETAL NEGATIVE: 0
GASTROINTESTINAL NEGATIVE: 0
RESPIRATORY NEGATIVE: 0
CARDIOVASCULAR NEGATIVE: 0
PSYCHIATRIC NEGATIVE: 0
EYES NEGATIVE: 0
ENDOCRINE NEGATIVE: 0
HEMATOLOGIC/LYMPHATIC NEGATIVE: 0
ALLERGIC/IMMUNOLOGIC NEGATIVE: 0
CONSTITUTIONAL NEGATIVE: 0

## 2025-08-19 ASSESSMENT — PATIENT HEALTH QUESTIONNAIRE - PHQ9
1. LITTLE INTEREST OR PLEASURE IN DOING THINGS: NOT AT ALL
2. FEELING DOWN, DEPRESSED OR HOPELESS: NOT AT ALL
SUM OF ALL RESPONSES TO PHQ9 QUESTIONS 1 AND 2: 0

## 2025-08-19 ASSESSMENT — EDINBURGH POSTNATAL DEPRESSION SCALE (EPDS)
I HAVE BEEN ABLE TO LAUGH AND SEE THE FUNNY SIDE OF THINGS: AS MUCH AS I ALWAYS COULD
THE THOUGHT OF HARMING MYSELF HAS OCCURRED TO ME: NEVER
TOTAL SCORE: 0
I HAVE BEEN ANXIOUS OR WORRIED FOR NO GOOD REASON: NO, NOT AT ALL
THINGS HAVE BEEN GETTING ON TOP OF ME: NO, I HAVE BEEN COPING AS WELL AS EVER
I HAVE BLAMED MYSELF UNNECESSARILY WHEN THINGS WENT WRONG: NO, NEVER
I HAVE BEEN SO UNHAPPY THAT I HAVE BEEN CRYING: NO, NEVER
I HAVE LOOKED FORWARD WITH ENJOYMENT TO THINGS: AS MUCH AS I EVER DID
I HAVE FELT SCARED OR PANICKY FOR NO GOOD REASON: NO, NOT AT ALL
I HAVE FELT SAD OR MISERABLE: NO, NOT AT ALL
I HAVE BEEN SO UNHAPPY THAT I HAVE HAD DIFFICULTY SLEEPING: NOT AT ALL

## 2025-08-19 ASSESSMENT — PAIN SCALES - GENERAL: PAINLEVEL_OUTOF10: 0-NO PAIN

## 2025-08-21 ENCOUNTER — HOSPITAL ENCOUNTER (OUTPATIENT)
Dept: RADIOLOGY | Facility: CLINIC | Age: 40
Discharge: HOME | End: 2025-08-21
Payer: COMMERCIAL

## 2025-08-21 DIAGNOSIS — Z09 POSTOPERATIVE EXAMINATION: ICD-10-CM

## 2025-08-21 PROCEDURE — 74177 CT ABD & PELVIS W/CONTRAST: CPT

## 2025-08-21 PROCEDURE — 2550000001 HC RX 255 CONTRASTS

## 2025-08-21 PROCEDURE — 74177 CT ABD & PELVIS W/CONTRAST: CPT | Performed by: STUDENT IN AN ORGANIZED HEALTH CARE EDUCATION/TRAINING PROGRAM

## 2025-08-21 RX ADMIN — IOHEXOL 75 ML: 350 INJECTION, SOLUTION INTRAVENOUS at 10:14

## 2025-08-26 ENCOUNTER — POSTPARTUM VISIT (OUTPATIENT)
Dept: OBSTETRICS AND GYNECOLOGY | Facility: CLINIC | Age: 40
End: 2025-08-26
Payer: COMMERCIAL

## 2025-08-26 VITALS
HEART RATE: 94 BPM | DIASTOLIC BLOOD PRESSURE: 81 MMHG | WEIGHT: 191.4 LBS | SYSTOLIC BLOOD PRESSURE: 119 MMHG | BODY MASS INDEX: 30.91 KG/M2

## 2025-08-26 DIAGNOSIS — K43.9 HERNIA OF ANTERIOR ABDOMINAL WALL: Primary | ICD-10-CM

## 2025-08-26 DIAGNOSIS — Z12.31 ENCOUNTER FOR SCREENING MAMMOGRAM FOR MALIGNANT NEOPLASM OF BREAST: ICD-10-CM

## 2025-08-26 PROCEDURE — 99212 OFFICE O/P EST SF 10 MIN: CPT

## 2025-08-26 PROCEDURE — 0503F POSTPARTUM CARE VISIT: CPT | Performed by: OBSTETRICS & GYNECOLOGY

## 2025-08-26 ASSESSMENT — ENCOUNTER SYMPTOMS
EYES NEGATIVE: 0
NEUROLOGICAL NEGATIVE: 0
CARDIOVASCULAR NEGATIVE: 0
ENDOCRINE NEGATIVE: 0
PSYCHIATRIC NEGATIVE: 0
MUSCULOSKELETAL NEGATIVE: 0
HEMATOLOGIC/LYMPHATIC NEGATIVE: 0
GASTROINTESTINAL NEGATIVE: 0
RESPIRATORY NEGATIVE: 0
ALLERGIC/IMMUNOLOGIC NEGATIVE: 0
CONSTITUTIONAL NEGATIVE: 0

## 2025-08-26 ASSESSMENT — PAIN SCALES - GENERAL: PAINLEVEL_OUTOF10: 0-NO PAIN

## 2025-08-28 LAB
LABORATORY COMMENT REPORT: NORMAL
PATH REPORT.FINAL DX SPEC: NORMAL
PATH REPORT.GROSS SPEC: NORMAL
PATH REPORT.RELEVANT HX SPEC: NORMAL
PATH REPORT.TOTAL CANCER: NORMAL

## 2025-08-29 ENCOUNTER — HOSPITAL ENCOUNTER (EMERGENCY)
Facility: HOSPITAL | Age: 40
Discharge: ANOTHER HOSPITAL | End: 2025-08-29
Attending: STUDENT IN AN ORGANIZED HEALTH CARE EDUCATION/TRAINING PROGRAM
Payer: COMMERCIAL

## 2025-08-29 ENCOUNTER — HOSPITAL ENCOUNTER (INPATIENT)
Facility: HOSPITAL | Age: 40
End: 2025-08-29
Attending: SPECIALIST | Admitting: SURGERY
Payer: COMMERCIAL

## 2025-08-29 ENCOUNTER — APPOINTMENT (OUTPATIENT)
Dept: RADIOLOGY | Facility: HOSPITAL | Age: 40
End: 2025-08-29
Payer: COMMERCIAL

## 2025-08-29 ENCOUNTER — APPOINTMENT (OUTPATIENT)
Dept: CARDIOLOGY | Facility: HOSPITAL | Age: 40
End: 2025-08-29
Payer: COMMERCIAL

## 2025-08-29 SDOH — HEALTH STABILITY: MENTAL HEALTH: NON-SPECIFIC ACTIVE SUICIDAL THOUGHTS (PAST 1 MONTH): NO

## 2025-08-29 SDOH — HEALTH STABILITY: MENTAL HEALTH: WISH TO BE DEAD (PAST 1 MONTH): NO

## 2025-08-29 SDOH — HEALTH STABILITY: MENTAL HEALTH: HAVE YOU USED ANY SUBSTANCES (CANABIS, COCAINE, HEROIN, HALLUCINOGENS, INHALANTS, ETC.) IN THE PAST 12 MONTHS?: NO

## 2025-08-29 SDOH — HEALTH STABILITY: MENTAL HEALTH: SUICIDAL BEHAVIOR (LIFETIME): NO

## 2025-08-29 SDOH — ECONOMIC STABILITY: HOUSING INSECURITY: DO YOU FEEL UNSAFE GOING BACK TO THE PLACE WHERE YOU ARE LIVING?: NO

## 2025-08-29 SDOH — SOCIAL STABILITY: SOCIAL INSECURITY: DOES ANYONE TRY TO KEEP YOU FROM HAVING/CONTACTING OTHER FRIENDS OR DOING THINGS OUTSIDE YOUR HOME?: NO

## 2025-08-29 SDOH — SOCIAL STABILITY: SOCIAL INSECURITY: VERBAL ABUSE: DENIES

## 2025-08-29 SDOH — SOCIAL STABILITY: SOCIAL INSECURITY: PHYSICAL ABUSE: DENIES

## 2025-08-29 SDOH — SOCIAL STABILITY: SOCIAL INSECURITY: ARE YOU OR HAVE YOU BEEN THREATENED OR ABUSED PHYSICALLY, EMOTIONALLY, OR SEXUALLY BY ANYONE?: NO

## 2025-08-29 SDOH — HEALTH STABILITY: MENTAL HEALTH: HAVE YOU USED ANY PRESCRIPTION DRUGS OTHER THAN PRESCRIBED IN THE PAST 12 MONTHS?: NO

## 2025-08-29 SDOH — SOCIAL STABILITY: SOCIAL INSECURITY: HAVE YOU HAD THOUGHTS OF HARMING ANYONE ELSE?: NO

## 2025-08-29 SDOH — SOCIAL STABILITY: SOCIAL INSECURITY: DO YOU FEEL ANYONE HAS EXPLOITED OR TAKEN ADVANTAGE OF YOU FINANCIALLY OR OF YOUR PERSONAL PROPERTY?: NO

## 2025-08-29 SDOH — SOCIAL STABILITY: SOCIAL INSECURITY: ARE THERE ANY APPARENT SIGNS OF INJURIES/BEHAVIORS THAT COULD BE RELATED TO ABUSE/NEGLECT?: NO

## 2025-08-29 SDOH — HEALTH STABILITY: MENTAL HEALTH: WERE YOU ABLE TO COMPLETE ALL THE BEHAVIORAL HEALTH SCREENINGS?: YES

## 2025-08-29 SDOH — SOCIAL STABILITY: SOCIAL INSECURITY: ABUSE SCREEN: ADULT

## 2025-08-29 SDOH — SOCIAL STABILITY: SOCIAL INSECURITY: HAS ANYONE EVER THREATENED TO HURT YOUR FAMILY OR YOUR PETS?: NO

## 2025-08-29 SDOH — SOCIAL STABILITY: SOCIAL INSECURITY: HAVE YOU HAD ANY THOUGHTS OF HARMING ANYONE ELSE?: NO

## 2025-08-29 ASSESSMENT — PATIENT HEALTH QUESTIONNAIRE - PHQ9
2. FEELING DOWN, DEPRESSED OR HOPELESS: NOT AT ALL
1. LITTLE INTEREST OR PLEASURE IN DOING THINGS: NOT AT ALL
SUM OF ALL RESPONSES TO PHQ9 QUESTIONS 1 & 2: 0

## 2025-08-29 ASSESSMENT — PAIN SCALES - GENERAL
PAINLEVEL_OUTOF10: 10 - WORST POSSIBLE PAIN
PAINLEVEL_OUTOF10: 4
PAINLEVEL_OUTOF10: 6

## 2025-08-29 ASSESSMENT — LIFESTYLE VARIABLES
HOW OFTEN DO YOU HAVE 6 OR MORE DRINKS ON ONE OCCASION: NEVER
AUDIT-C TOTAL SCORE: 0
HOW MANY STANDARD DRINKS CONTAINING ALCOHOL DO YOU HAVE ON A TYPICAL DAY: PATIENT DOES NOT DRINK
AUDIT-C TOTAL SCORE: 0
HOW OFTEN DO YOU HAVE A DRINK CONTAINING ALCOHOL: NEVER
SKIP TO QUESTIONS 9-10: 1

## 2025-08-29 ASSESSMENT — PAIN DESCRIPTION - LOCATION
LOCATION: ABDOMEN
LOCATION: ABDOMEN

## 2025-08-29 ASSESSMENT — PAIN - FUNCTIONAL ASSESSMENT
PAIN_FUNCTIONAL_ASSESSMENT: 0-10
PAIN_FUNCTIONAL_ASSESSMENT: 0-10

## 2025-08-29 ASSESSMENT — PAIN DESCRIPTION - PAIN TYPE
TYPE: ACUTE PAIN
TYPE: ACUTE PAIN

## 2025-08-30 ENCOUNTER — ANESTHESIA (OUTPATIENT)
Dept: OPERATING ROOM | Facility: HOSPITAL | Age: 40
End: 2025-08-30

## 2025-08-30 ENCOUNTER — ANESTHESIA EVENT (OUTPATIENT)
Dept: OPERATING ROOM | Facility: HOSPITAL | Age: 40
End: 2025-08-30

## 2025-08-30 PROBLEM — E66.9 OBESITY: Status: ACTIVE | Noted: 2025-08-30

## 2025-08-30 PROBLEM — D64.9 ANEMIA: Status: ACTIVE | Noted: 2025-08-30

## 2025-08-30 PROBLEM — T81.49XA POSTOPERATIVE ABSCESS: Status: ACTIVE | Noted: 2025-08-29

## 2025-08-30 PROCEDURE — 2500000004 HC RX 250 GENERAL PHARMACY W/ HCPCS (ALT 636 FOR OP/ED)

## 2025-08-30 RX ORDER — CEFAZOLIN 1 G/1
INJECTION, POWDER, FOR SOLUTION INTRAVENOUS AS NEEDED
Status: DISCONTINUED | OUTPATIENT
Start: 2025-08-30 | End: 2025-08-30

## 2025-08-30 RX ORDER — LIDOCAINE HCL/PF 100 MG/5ML
SYRINGE (ML) INTRAVENOUS AS NEEDED
Status: DISCONTINUED | OUTPATIENT
Start: 2025-08-30 | End: 2025-08-30

## 2025-08-30 RX ORDER — PROPOFOL 10 MG/ML
INJECTION, EMULSION INTRAVENOUS AS NEEDED
Status: DISCONTINUED | OUTPATIENT
Start: 2025-08-30 | End: 2025-08-30

## 2025-08-30 RX ORDER — ROCURONIUM BROMIDE 10 MG/ML
INJECTION, SOLUTION INTRAVENOUS AS NEEDED
Status: DISCONTINUED | OUTPATIENT
Start: 2025-08-30 | End: 2025-08-30

## 2025-08-30 RX ORDER — METRONIDAZOLE 500 MG/100ML
INJECTION, SOLUTION INTRAVENOUS AS NEEDED
Status: DISCONTINUED | OUTPATIENT
Start: 2025-08-30 | End: 2025-08-30

## 2025-08-30 RX ORDER — FENTANYL CITRATE 50 UG/ML
INJECTION, SOLUTION INTRAMUSCULAR; INTRAVENOUS AS NEEDED
Status: DISCONTINUED | OUTPATIENT
Start: 2025-08-30 | End: 2025-08-30

## 2025-08-30 RX ORDER — MIDAZOLAM HYDROCHLORIDE 2 MG/2ML
INJECTION, SOLUTION INTRAMUSCULAR; INTRAVENOUS AS NEEDED
Status: DISCONTINUED | OUTPATIENT
Start: 2025-08-30 | End: 2025-08-30

## 2025-08-30 RX ORDER — HYDROMORPHONE HYDROCHLORIDE 1 MG/ML
INJECTION, SOLUTION INTRAMUSCULAR; INTRAVENOUS; SUBCUTANEOUS AS NEEDED
Status: DISCONTINUED | OUTPATIENT
Start: 2025-08-30 | End: 2025-08-30

## 2025-08-30 RX ADMIN — MIDAZOLAM HYDROCHLORIDE 2 MG: 2 INJECTION, SOLUTION INTRAMUSCULAR; INTRAVENOUS at 05:33

## 2025-08-30 RX ADMIN — HYDROMORPHONE HYDROCHLORIDE 0.2 MG: 1 INJECTION, SOLUTION INTRAMUSCULAR; INTRAVENOUS; SUBCUTANEOUS at 07:22

## 2025-08-30 RX ADMIN — LIDOCAINE HYDROCHLORIDE 100 MG: 20 INJECTION INTRAVENOUS at 05:44

## 2025-08-30 RX ADMIN — ONDANSETRON 4 MG: 2 INJECTION, SOLUTION INTRAMUSCULAR; INTRAVENOUS at 07:46

## 2025-08-30 RX ADMIN — DEXAMETHASONE SODIUM PHOSPHATE 10 MG: 4 INJECTION INTRA-ARTICULAR; INTRALESIONAL; INTRAMUSCULAR; INTRAVENOUS; SOFT TISSUE at 05:56

## 2025-08-30 RX ADMIN — ROCURONIUM BROMIDE 10 MG: 10 INJECTION, SOLUTION INTRAVENOUS at 07:14

## 2025-08-30 RX ADMIN — ROCURONIUM BROMIDE 80 MG: 10 INJECTION, SOLUTION INTRAVENOUS at 05:44

## 2025-08-30 RX ADMIN — HYDROMORPHONE HYDROCHLORIDE 0.2 MG: 1 INJECTION, SOLUTION INTRAMUSCULAR; INTRAVENOUS; SUBCUTANEOUS at 06:29

## 2025-08-30 RX ADMIN — METRONIDAZOLE 500 MG: 5 INJECTION, SOLUTION INTRAVENOUS at 06:38

## 2025-08-30 RX ADMIN — PROPOFOL 150 MG: 10 INJECTION, EMULSION INTRAVENOUS at 05:44

## 2025-08-30 RX ADMIN — SODIUM CHLORIDE, SODIUM LACTATE, POTASSIUM CHLORIDE, AND CALCIUM CHLORIDE: 600; 310; 30; 20 INJECTION, SOLUTION INTRAVENOUS at 05:35

## 2025-08-30 RX ADMIN — FENTANYL CITRATE 100 MCG: 50 INJECTION, SOLUTION INTRAMUSCULAR; INTRAVENOUS at 05:44

## 2025-08-30 RX ADMIN — CEFAZOLIN 2 G: 1 INJECTION, POWDER, FOR SOLUTION INTRAMUSCULAR; INTRAVENOUS at 06:07

## 2025-08-30 RX ADMIN — SUGAMMADEX 200 MG: 100 INJECTION, SOLUTION INTRAVENOUS at 08:17

## 2025-08-30 RX ADMIN — HYDROMORPHONE HYDROCHLORIDE 0.2 MG: 1 INJECTION, SOLUTION INTRAMUSCULAR; INTRAVENOUS; SUBCUTANEOUS at 08:17

## 2025-08-30 SDOH — SOCIAL STABILITY: SOCIAL INSECURITY
WITHIN THE LAST YEAR, HAVE YOU BEEN RAPED OR FORCED TO HAVE ANY KIND OF SEXUAL ACTIVITY BY YOUR PARTNER OR EX-PARTNER?: PATIENT DECLINED

## 2025-08-30 SDOH — SOCIAL STABILITY: SOCIAL INSECURITY: WITHIN THE LAST YEAR, HAVE YOU BEEN AFRAID OF YOUR PARTNER OR EX-PARTNER?: PATIENT DECLINED

## 2025-08-30 SDOH — SOCIAL STABILITY: SOCIAL INSECURITY
WITHIN THE LAST YEAR, HAVE YOU BEEN KICKED, HIT, SLAPPED, OR OTHERWISE PHYSICALLY HURT BY YOUR PARTNER OR EX-PARTNER?: PATIENT DECLINED

## 2025-08-30 SDOH — SOCIAL STABILITY: SOCIAL INSECURITY
WITHIN THE LAST YEAR, HAVE YOU BEEN HUMILIATED OR EMOTIONALLY ABUSED IN OTHER WAYS BY YOUR PARTNER OR EX-PARTNER?: PATIENT DECLINED

## 2025-08-30 SDOH — ECONOMIC STABILITY: FOOD INSECURITY: WITHIN THE PAST 12 MONTHS, THE FOOD YOU BOUGHT JUST DIDN'T LAST AND YOU DIDN'T HAVE MONEY TO GET MORE.: PATIENT DECLINED

## 2025-08-30 SDOH — ECONOMIC STABILITY: FOOD INSECURITY: HOW HARD IS IT FOR YOU TO PAY FOR THE VERY BASICS LIKE FOOD, HOUSING, MEDICAL CARE, AND HEATING?: PATIENT DECLINED

## 2025-08-30 SDOH — HEALTH STABILITY: MENTAL HEALTH: CURRENT SMOKER: 1

## 2025-08-30 SDOH — ECONOMIC STABILITY: FOOD INSECURITY
WITHIN THE PAST 12 MONTHS, YOU WORRIED THAT YOUR FOOD WOULD RUN OUT BEFORE YOU GOT THE MONEY TO BUY MORE.: PATIENT DECLINED

## 2025-08-30 SDOH — ECONOMIC STABILITY: TRANSPORTATION INSECURITY
IN THE PAST 12 MONTHS, HAS LACK OF TRANSPORTATION KEPT YOU FROM MEDICAL APPOINTMENTS OR FROM GETTING MEDICATIONS?: PATIENT DECLINED

## 2025-08-30 ASSESSMENT — PAIN - FUNCTIONAL ASSESSMENT
PAIN_FUNCTIONAL_ASSESSMENT: 0-10
PAIN_FUNCTIONAL_ASSESSMENT: UNABLE TO SELF-REPORT
PAIN_FUNCTIONAL_ASSESSMENT: 0-10

## 2025-08-30 ASSESSMENT — COGNITIVE AND FUNCTIONAL STATUS - GENERAL
DAILY ACTIVITIY SCORE: 24
PATIENT BASELINE BEDBOUND: NO
MOBILITY SCORE: 24
MOBILITY SCORE: 24
DAILY ACTIVITIY SCORE: 24

## 2025-08-30 ASSESSMENT — ACTIVITIES OF DAILY LIVING (ADL)
HEARING - RIGHT EAR: FUNCTIONAL
BATHING: INDEPENDENT
TOILETING: INDEPENDENT
ADEQUATE_TO_COMPLETE_ADL: YES
FEEDING YOURSELF: INDEPENDENT
LACK_OF_TRANSPORTATION: PATIENT DECLINED
HEARING - LEFT EAR: FUNCTIONAL
JUDGMENT_ADEQUATE_SAFELY_COMPLETE_DAILY_ACTIVITIES: YES
LACK_OF_TRANSPORTATION: PATIENT DECLINED
GROOMING: INDEPENDENT
PATIENT'S MEMORY ADEQUATE TO SAFELY COMPLETE DAILY ACTIVITIES?: YES
DRESSING YOURSELF: INDEPENDENT
WALKS IN HOME: INDEPENDENT

## 2025-08-30 ASSESSMENT — PAIN SCALES - GENERAL
PAINLEVEL_OUTOF10: 0 - NO PAIN
PAINLEVEL_OUTOF10: 8
PAINLEVEL_OUTOF10: 1
PAIN_LEVEL: 2
PAINLEVEL_OUTOF10: 8
PAINLEVEL_OUTOF10: 2
PAINLEVEL_OUTOF10: 0 - NO PAIN
PAINLEVEL_OUTOF10: 2
PAINLEVEL_OUTOF10: 0 - NO PAIN
PAINLEVEL_OUTOF10: 8

## 2025-08-30 ASSESSMENT — PAIN DESCRIPTION - DESCRIPTORS
DESCRIPTORS: ACHING

## 2025-08-31 ASSESSMENT — COGNITIVE AND FUNCTIONAL STATUS - GENERAL
STANDING UP FROM CHAIR USING ARMS: A LITTLE
MOVING TO AND FROM BED TO CHAIR: A LITTLE
STANDING UP FROM CHAIR USING ARMS: A LITTLE
MOBILITY SCORE: 18
PERSONAL GROOMING: A LITTLE
CLIMB 3 TO 5 STEPS WITH RAILING: A LITTLE
PERSONAL GROOMING: A LITTLE
TOILETING: A LITTLE
EATING MEALS: A LITTLE
HELP NEEDED FOR BATHING: A LITTLE
TURNING FROM BACK TO SIDE WHILE IN FLAT BAD: A LITTLE
DRESSING REGULAR LOWER BODY CLOTHING: A LITTLE
TURNING FROM BACK TO SIDE WHILE IN FLAT BAD: A LITTLE
HELP NEEDED FOR BATHING: A LITTLE
MOVING TO AND FROM BED TO CHAIR: A LITTLE
MOBILITY SCORE: 18
WALKING IN HOSPITAL ROOM: A LITTLE
WALKING IN HOSPITAL ROOM: A LITTLE
STANDING UP FROM CHAIR USING ARMS: A LITTLE
TOILETING: A LITTLE
DAILY ACTIVITIY SCORE: 18
CLIMB 3 TO 5 STEPS WITH RAILING: A LITTLE
DRESSING REGULAR UPPER BODY CLOTHING: A LITTLE
DAILY ACTIVITIY SCORE: 18
MOVING TO AND FROM BED TO CHAIR: A LITTLE
PERSONAL GROOMING: A LITTLE
DRESSING REGULAR UPPER BODY CLOTHING: A LITTLE
MOBILITY SCORE: 18
EATING MEALS: A LITTLE
DRESSING REGULAR UPPER BODY CLOTHING: A LITTLE
TURNING FROM BACK TO SIDE WHILE IN FLAT BAD: A LITTLE
MOVING FROM LYING ON BACK TO SITTING ON SIDE OF FLAT BED WITH BEDRAILS: A LITTLE
DRESSING REGULAR LOWER BODY CLOTHING: A LITTLE
DAILY ACTIVITIY SCORE: 18
HELP NEEDED FOR BATHING: A LITTLE
TOILETING: A LITTLE
WALKING IN HOSPITAL ROOM: A LOT
DRESSING REGULAR LOWER BODY CLOTHING: A LITTLE
MOVING FROM LYING ON BACK TO SITTING ON SIDE OF FLAT BED WITH BEDRAILS: A LITTLE
MOVING FROM LYING ON BACK TO SITTING ON SIDE OF FLAT BED WITH BEDRAILS: A LITTLE
EATING MEALS: A LITTLE

## 2025-08-31 ASSESSMENT — PAIN - FUNCTIONAL ASSESSMENT
PAIN_FUNCTIONAL_ASSESSMENT: 0-10

## 2025-08-31 ASSESSMENT — PAIN DESCRIPTION - DESCRIPTORS
DESCRIPTORS: ACHING
DESCRIPTORS: ACHING

## 2025-08-31 ASSESSMENT — PAIN SCALES - GENERAL
PAINLEVEL_OUTOF10: 2

## 2025-09-01 ENCOUNTER — APPOINTMENT (OUTPATIENT)
Dept: CARDIOLOGY | Facility: HOSPITAL | Age: 40
End: 2025-09-01
Payer: COMMERCIAL

## 2025-09-01 PROCEDURE — 93005 ELECTROCARDIOGRAM TRACING: CPT

## 2025-09-01 ASSESSMENT — PAIN SCALES - GENERAL
PAINLEVEL_OUTOF10: 2
PAINLEVEL_OUTOF10: 7
PAINLEVEL_OUTOF10: 2

## 2025-09-01 ASSESSMENT — COGNITIVE AND FUNCTIONAL STATUS - GENERAL
CLIMB 3 TO 5 STEPS WITH RAILING: A LITTLE
MOBILITY SCORE: 22
TOILETING: A LITTLE
DAILY ACTIVITIY SCORE: 22
WALKING IN HOSPITAL ROOM: A LITTLE
HELP NEEDED FOR BATHING: A LITTLE

## 2025-09-01 ASSESSMENT — PAIN - FUNCTIONAL ASSESSMENT
PAIN_FUNCTIONAL_ASSESSMENT: 0-10

## 2025-09-02 ASSESSMENT — COGNITIVE AND FUNCTIONAL STATUS - GENERAL
MOBILITY SCORE: 22
TOILETING: A LITTLE
CLIMB 3 TO 5 STEPS WITH RAILING: A LITTLE
CLIMB 3 TO 5 STEPS WITH RAILING: A LITTLE
DAILY ACTIVITIY SCORE: 22
WALKING IN HOSPITAL ROOM: A LITTLE
HELP NEEDED FOR BATHING: A LITTLE
TOILETING: A LITTLE
MOBILITY SCORE: 22
DAILY ACTIVITIY SCORE: 22
HELP NEEDED FOR BATHING: A LITTLE
WALKING IN HOSPITAL ROOM: A LITTLE

## 2025-09-02 ASSESSMENT — PAIN SCALES - GENERAL
PAINLEVEL_OUTOF10: 3
PAINLEVEL_OUTOF10: 7
PAINLEVEL_OUTOF10: 7
PAINLEVEL_OUTOF10: 8

## 2025-09-02 ASSESSMENT — PAIN - FUNCTIONAL ASSESSMENT
PAIN_FUNCTIONAL_ASSESSMENT: 0-10

## 2025-09-03 ASSESSMENT — COGNITIVE AND FUNCTIONAL STATUS - GENERAL
MOBILITY SCORE: 24
DAILY ACTIVITIY SCORE: 24
DAILY ACTIVITIY SCORE: 24
MOBILITY SCORE: 24
MOBILITY SCORE: 24
DAILY ACTIVITIY SCORE: 24

## 2025-09-03 ASSESSMENT — PAIN SCALES - GENERAL
PAINLEVEL_OUTOF10: 8
PAINLEVEL_OUTOF10: 9
PAINLEVEL_OUTOF10: 9
PAINLEVEL_OUTOF10: 10 - WORST POSSIBLE PAIN
PAINLEVEL_OUTOF10: 8
PAINLEVEL_OUTOF10: 2

## 2025-09-03 ASSESSMENT — PAIN - FUNCTIONAL ASSESSMENT
PAIN_FUNCTIONAL_ASSESSMENT: 0-10

## 2025-09-03 ASSESSMENT — PAIN DESCRIPTION - DESCRIPTORS
DESCRIPTORS: ACHING;CRAMPING
DESCRIPTORS: DISCOMFORT;ACHING
DESCRIPTORS: ACHING;CRAMPING

## 2025-09-03 ASSESSMENT — PAIN DESCRIPTION - LOCATION
LOCATION: ABDOMEN

## 2025-09-03 ASSESSMENT — PAIN DESCRIPTION - ORIENTATION
ORIENTATION: MID
ORIENTATION: RIGHT;MID

## 2025-09-04 ENCOUNTER — DOCUMENTATION (OUTPATIENT)
Dept: HOME HEALTH SERVICES | Facility: HOME HEALTH | Age: 40
End: 2025-09-04
Payer: COMMERCIAL

## 2025-09-04 ENCOUNTER — PHARMACY VISIT (OUTPATIENT)
Dept: PHARMACY | Facility: CLINIC | Age: 40
End: 2025-09-04
Payer: COMMERCIAL

## 2025-09-04 ENCOUNTER — TELEPHONE (OUTPATIENT)
Dept: OBSTETRICS AND GYNECOLOGY | Facility: CLINIC | Age: 40
End: 2025-09-04
Payer: COMMERCIAL

## 2025-09-04 ENCOUNTER — APPOINTMENT (OUTPATIENT)
Dept: RADIOLOGY | Facility: HOSPITAL | Age: 40
End: 2025-09-04
Payer: COMMERCIAL

## 2025-09-04 PROCEDURE — RXMED WILLOW AMBULATORY MEDICATION CHARGE

## 2025-09-04 ASSESSMENT — PAIN DESCRIPTION - DESCRIPTORS
DESCRIPTORS: ACHING;DISCOMFORT;SORE
DESCRIPTORS: ACHING;DISCOMFORT

## 2025-09-04 ASSESSMENT — PAIN - FUNCTIONAL ASSESSMENT
PAIN_FUNCTIONAL_ASSESSMENT: 0-10
PAIN_FUNCTIONAL_ASSESSMENT: 0-10

## 2025-09-04 ASSESSMENT — PAIN SCALES - GENERAL
PAINLEVEL_OUTOF10: 7
PAINLEVEL_OUTOF10: 7

## 2025-09-04 ASSESSMENT — PAIN DESCRIPTION - LOCATION
LOCATION: ABDOMEN
LOCATION: ABDOMEN

## 2025-09-06 ENCOUNTER — HOME CARE VISIT (OUTPATIENT)
Dept: HOME HEALTH SERVICES | Facility: HOME HEALTH | Age: 40
End: 2025-09-06
Payer: COMMERCIAL

## 2025-09-06 VITALS
HEART RATE: 99 BPM | DIASTOLIC BLOOD PRESSURE: 68 MMHG | SYSTOLIC BLOOD PRESSURE: 118 MMHG | WEIGHT: 187 LBS | OXYGEN SATURATION: 99 % | HEIGHT: 66 IN | BODY MASS INDEX: 30.05 KG/M2 | TEMPERATURE: 97.2 F | RESPIRATION RATE: 12 BRPM

## 2025-09-06 PROCEDURE — G0299 HHS/HOSPICE OF RN EA 15 MIN: HCPCS

## 2025-09-06 SDOH — ECONOMIC STABILITY: FOOD INSECURITY: MEALS PER DAY: 2

## 2025-09-06 SDOH — ECONOMIC STABILITY: FOOD INSECURITY: MEALS PER DAY: 3

## 2025-09-06 ASSESSMENT — ENCOUNTER SYMPTOMS
PAIN LOCATION - PAIN QUALITY: ACHE
OCCASIONAL FEELINGS OF UNSTEADINESS: 0
PAIN LOCATION - PAIN SEVERITY: 4/10
PAIN SEVERITY GOAL: 4/10
MUSCLE WEAKNESS: 1
PAIN SEVERITY GOAL: 4/10
PAIN: 1
AGGRESSION WITHIN DEFINED LIMITS: 1
CHANGE IN APPETITE: VARYING
FATIGUE: 1
PAIN: 1
FATIGUES EASILY: 1
LOWEST PAIN SEVERITY IN PAST 24 HOURS: 4/10
PAIN LOCATION: ABDOMEN
PAIN LOCATION - PAIN QUALITY: ACHE
SUBJECTIVE PAIN PROGRESSION: WAXING AND WANING
DEPRESSION: 0
PAIN LOCATION - RELIEVING FACTORS: REST, PAIN MEDS
CHANGE IN APPETITE: VARYING
PAIN LOCATION - PAIN FREQUENCY: INTERMITTENT
ANGER WITHIN DEFINED LIMITS: 1
STOOL FREQUENCY: DAILY
PAIN LOCATION - PAIN DURATION: DAILY
APPETITE LEVEL: FAIR
APPETITE LEVEL: FAIR
FATIGUES EASILY: 1
PERSON REPORTING PAIN: PATIENT
PAIN LOCATION - EXACERBATING FACTORS: SURGERY
LOWEST PAIN SEVERITY IN PAST 24 HOURS: 5/10
LOSS OF SENSATION IN FEET: 0
PAIN LOCATION - PAIN DURATION: DAILY
HIGHEST PAIN SEVERITY IN PAST 24 HOURS: 9/10
HIGHEST PAIN SEVERITY IN PAST 24 HOURS: 9/10
PERSON REPORTING PAIN: PATIENT
ABDOMINAL PAIN: 1
PAIN LOCATION - PAIN SEVERITY: 5/10
PAIN LOCATION - PAIN FREQUENCY: INTERMITTENT
PAIN LOCATION: ABDOMEN
SUBJECTIVE PAIN PROGRESSION: WAXING AND WANING
SLEEP QUALITY: ADEQUATE

## 2025-09-06 ASSESSMENT — PAIN SCALES - PAIN ASSESSMENT IN ADVANCED DEMENTIA (PAINAD)
BODYLANGUAGE: 0 - RELAXED.
FACIALEXPRESSION: 0
BODYLANGUAGE: 0
NEGVOCALIZATION: 0
TOTALSCORE: 0
CONSOLABILITY: 0 - NO NEED TO CONSOLE.
NEGVOCALIZATION: 0 - NONE.
BODYLANGUAGE: 0 - RELAXED.
NEGVOCALIZATION: 0 - NONE.
FACIALEXPRESSION: 0 - SMILING OR INEXPRESSIVE.
CONSOLABILITY: 0
BODYLANGUAGE: 0
NEGVOCALIZATION: 0
FACIALEXPRESSION: 0 - SMILING OR INEXPRESSIVE.
CONSOLABILITY: 0
CONSOLABILITY: 0 - NO NEED TO CONSOLE.
BREATHING: 0
BREATHING: 0
TOTALSCORE: 0
FACIALEXPRESSION: 0

## 2025-09-06 ASSESSMENT — ACTIVITIES OF DAILY LIVING (ADL)
OASIS_M1830: 03
ENTERING_EXITING_HOME: MODERATE ASSIST

## 2025-09-08 ENCOUNTER — APPOINTMENT (OUTPATIENT)
Dept: SURGERY | Facility: CLINIC | Age: 40
End: 2025-09-08
Payer: COMMERCIAL

## 2025-09-23 ENCOUNTER — APPOINTMENT (OUTPATIENT)
Dept: OBSTETRICS AND GYNECOLOGY | Facility: CLINIC | Age: 40
End: 2025-09-23
Payer: COMMERCIAL

## (undated) DEVICE — GLOVE, SURGICAL, PROTEXIS PI MICRO, 6.0, PF, LF

## (undated) DEVICE — SUTURE, VICRYL, 0, 36 IN, CT, UNDYED

## (undated) DEVICE — GLOVE, SURGICAL, PROTEXIS PI BLUE W/NEUTHERA, 6.5, PF, LF

## (undated) DEVICE — MANIFOLD, 4 PORT NEPTUNE STANDARD

## (undated) DEVICE — PREP TRAY, SKIN, DRY, W/GLOVES

## (undated) DEVICE — SUTURE, VICRYL PLUS, 0, 27IN, UR-6, VIOLET, BRAIDED

## (undated) DEVICE — TUBE SET, PNEUMOCLEAR, SMOKE EVACU, HIGH-FLOW

## (undated) DEVICE — CANNULA, KII ADVANCED FIXATION, 5X100MM W/SEAL

## (undated) DEVICE — NEEDLE, INSUFFLATION, 13GAX120MM, DISP

## (undated) DEVICE — TOWELS 4-PK

## (undated) DEVICE — COVER, CART, 45 X 27 X 48 IN, CLEAR

## (undated) DEVICE — CONTAINER, SPECIMEN, 120 ML, STERILE

## (undated) DEVICE — SUTURE, VICRYL, 0, 27 IN, UR-6, VIOLET

## (undated) DEVICE — HOLSTER, JET SAFETY

## (undated) DEVICE — Device

## (undated) DEVICE — NEEDLE, INSUFFLATION, ACCESS, SHORT, 14 G X 70 MM

## (undated) DEVICE — ADHESIVE, SKIN, DERMABOND ADVANCED, 15CM, PEN-STYLE

## (undated) DEVICE — CARE KIT, LAPAROSCOPIC, ADVANCED

## (undated) DEVICE — TROCAR SYSTEM, BALLOON, KII GELPORT, 12 X 100MM

## (undated) DEVICE — SUTURE, VICRYL, 4-0, 18 IN, UNDYED BR PS-2

## (undated) DEVICE — REST, HEAD, BAGEL, 9 IN

## (undated) DEVICE — PROTECTOR, NERVE, ULNAR, PINK

## (undated) DEVICE — TUBE, SALEM SUMP, 16 FR X 48IN, ENFIT

## (undated) DEVICE — SUTURE, PDS II, 0 36 IN, CT-1, VIOLET

## (undated) DEVICE — APPLICATOR, CHLORAPREP, W/ORANGE TINT, 26ML

## (undated) DEVICE — TOWEL PACK, STERILE, 4/PACK, BLUE

## (undated) DEVICE — GOWN, SURGICAL, SMARTGOWN, XLARGE, STERILE

## (undated) DEVICE — SUTURE, MONOCRYL PLUS, 4-0, PS-2 UD 27IN

## (undated) DEVICE — DRAPE PACK, CESAREAN SECTION, CUSTOM, UHC

## (undated) DEVICE — SUTURE, MONOCRYL, 2-0, 36 IN, CT-1, UNDYED